# Patient Record
Sex: MALE | Race: WHITE | NOT HISPANIC OR LATINO | Employment: FULL TIME | URBAN - METROPOLITAN AREA
[De-identification: names, ages, dates, MRNs, and addresses within clinical notes are randomized per-mention and may not be internally consistent; named-entity substitution may affect disease eponyms.]

---

## 2017-09-29 ENCOUNTER — HOSPITAL ENCOUNTER (EMERGENCY)
Facility: HOSPITAL | Age: 57
Discharge: HOME | End: 2017-09-29
Attending: EMERGENCY MEDICINE | Admitting: EMERGENCY MEDICINE

## 2017-09-29 ENCOUNTER — APPOINTMENT (OUTPATIENT)
Dept: CT IMAGING | Facility: HOSPITAL | Age: 57
End: 2017-09-29

## 2017-09-29 VITALS
HEART RATE: 60 BPM | BODY MASS INDEX: 26.79 KG/M2 | TEMPERATURE: 98.5 F | DIASTOLIC BLOOD PRESSURE: 85 MMHG | OXYGEN SATURATION: 97 % | SYSTOLIC BLOOD PRESSURE: 135 MMHG | HEIGHT: 76 IN | RESPIRATION RATE: 16 BRPM | WEIGHT: 220 LBS

## 2017-09-29 DIAGNOSIS — N20.1 RIGHT URETERAL STONE: Primary | ICD-10-CM

## 2017-09-29 LAB
ALBUMIN SERPL-MCNC: 4.3 G/DL (ref 3.2–4.8)
ALBUMIN/GLOB SERPL: 1.7 G/DL (ref 1.5–2.5)
ALP SERPL-CCNC: 109 U/L (ref 25–100)
ALT SERPL W P-5'-P-CCNC: 33 U/L (ref 7–40)
ANION GAP SERPL CALCULATED.3IONS-SCNC: 10 MMOL/L (ref 3–11)
AST SERPL-CCNC: 31 U/L (ref 0–33)
BACTERIA UR QL AUTO: ABNORMAL /HPF
BASOPHILS # BLD AUTO: 0.01 10*3/MM3 (ref 0–0.2)
BASOPHILS NFR BLD AUTO: 0.1 % (ref 0–1)
BILIRUB SERPL-MCNC: 0.6 MG/DL (ref 0.3–1.2)
BILIRUB UR QL STRIP: NEGATIVE
BUN BLD-MCNC: 10 MG/DL (ref 9–23)
BUN/CREAT SERPL: 9.1 (ref 7–25)
CALCIUM SPEC-SCNC: 9.6 MG/DL (ref 8.7–10.4)
CHLORIDE SERPL-SCNC: 102 MMOL/L (ref 99–109)
CLARITY UR: CLEAR
CO2 SERPL-SCNC: 25 MMOL/L (ref 20–31)
COLOR UR: YELLOW
CREAT BLD-MCNC: 1.1 MG/DL (ref 0.6–1.3)
DEPRECATED RDW RBC AUTO: 40 FL (ref 37–54)
EOSINOPHIL # BLD AUTO: 0.15 10*3/MM3 (ref 0–0.3)
EOSINOPHIL NFR BLD AUTO: 1.7 % (ref 0–3)
ERYTHROCYTE [DISTWIDTH] IN BLOOD BY AUTOMATED COUNT: 12.5 % (ref 11.3–14.5)
GFR SERPL CREATININE-BSD FRML MDRD: 69 ML/MIN/1.73
GLOBULIN UR ELPH-MCNC: 2.5 GM/DL
GLUCOSE BLD-MCNC: 159 MG/DL (ref 70–100)
GLUCOSE UR STRIP-MCNC: NEGATIVE MG/DL
HCT VFR BLD AUTO: 40.5 % (ref 38.9–50.9)
HGB BLD-MCNC: 13.6 G/DL (ref 13.1–17.5)
HGB UR QL STRIP.AUTO: ABNORMAL
HOLD SPECIMEN: NORMAL
HOLD SPECIMEN: NORMAL
HYALINE CASTS UR QL AUTO: ABNORMAL /LPF
IMM GRANULOCYTES # BLD: 0.03 10*3/MM3 (ref 0–0.03)
IMM GRANULOCYTES NFR BLD: 0.3 % (ref 0–0.6)
KETONES UR QL STRIP: NEGATIVE
LEUKOCYTE ESTERASE UR QL STRIP.AUTO: NEGATIVE
LIPASE SERPL-CCNC: 172 U/L (ref 6–51)
LYMPHOCYTES # BLD AUTO: 1.43 10*3/MM3 (ref 0.6–4.8)
LYMPHOCYTES NFR BLD AUTO: 16.4 % (ref 24–44)
MCH RBC QN AUTO: 29.8 PG (ref 27–31)
MCHC RBC AUTO-ENTMCNC: 33.6 G/DL (ref 32–36)
MCV RBC AUTO: 88.6 FL (ref 80–99)
MONOCYTES # BLD AUTO: 0.81 10*3/MM3 (ref 0–1)
MONOCYTES NFR BLD AUTO: 9.3 % (ref 0–12)
NEUTROPHILS # BLD AUTO: 6.31 10*3/MM3 (ref 1.5–8.3)
NEUTROPHILS NFR BLD AUTO: 72.2 % (ref 41–71)
NITRITE UR QL STRIP: NEGATIVE
PH UR STRIP.AUTO: 6 [PH] (ref 5–8)
PLATELET # BLD AUTO: 198 10*3/MM3 (ref 150–450)
PMV BLD AUTO: 9.5 FL (ref 6–12)
POTASSIUM BLD-SCNC: 4.2 MMOL/L (ref 3.5–5.5)
PROT SERPL-MCNC: 6.8 G/DL (ref 5.7–8.2)
PROT UR QL STRIP: NEGATIVE
RBC # BLD AUTO: 4.57 10*6/MM3 (ref 4.2–5.76)
RBC # UR: ABNORMAL /HPF
REF LAB TEST METHOD: ABNORMAL
SODIUM BLD-SCNC: 137 MMOL/L (ref 132–146)
SP GR UR STRIP: 1.01 (ref 1–1.03)
SQUAMOUS #/AREA URNS HPF: ABNORMAL /HPF
UROBILINOGEN UR QL STRIP: ABNORMAL
WBC NRBC COR # BLD: 8.74 10*3/MM3 (ref 3.5–10.8)
WBC UR QL AUTO: ABNORMAL /HPF
WHOLE BLOOD HOLD SPECIMEN: NORMAL
WHOLE BLOOD HOLD SPECIMEN: NORMAL

## 2017-09-29 PROCEDURE — 85025 COMPLETE CBC W/AUTO DIFF WBC: CPT | Performed by: EMERGENCY MEDICINE

## 2017-09-29 PROCEDURE — 96374 THER/PROPH/DIAG INJ IV PUSH: CPT

## 2017-09-29 PROCEDURE — 99284 EMERGENCY DEPT VISIT MOD MDM: CPT

## 2017-09-29 PROCEDURE — 25010000002 MORPHINE PER 10 MG: Performed by: EMERGENCY MEDICINE

## 2017-09-29 PROCEDURE — 74176 CT ABD & PELVIS W/O CONTRAST: CPT

## 2017-09-29 PROCEDURE — 80053 COMPREHEN METABOLIC PANEL: CPT | Performed by: EMERGENCY MEDICINE

## 2017-09-29 PROCEDURE — 83690 ASSAY OF LIPASE: CPT | Performed by: EMERGENCY MEDICINE

## 2017-09-29 PROCEDURE — 81001 URINALYSIS AUTO W/SCOPE: CPT | Performed by: EMERGENCY MEDICINE

## 2017-09-29 PROCEDURE — 25010000002 KETOROLAC TROMETHAMINE PER 15 MG: Performed by: EMERGENCY MEDICINE

## 2017-09-29 PROCEDURE — 96375 TX/PRO/DX INJ NEW DRUG ADDON: CPT

## 2017-09-29 RX ORDER — SODIUM CHLORIDE 0.9 % (FLUSH) 0.9 %
10 SYRINGE (ML) INJECTION AS NEEDED
Status: DISCONTINUED | OUTPATIENT
Start: 2017-09-29 | End: 2017-09-29 | Stop reason: HOSPADM

## 2017-09-29 RX ORDER — OMEPRAZOLE 20 MG/1
20 CAPSULE, DELAYED RELEASE ORAL DAILY
COMMUNITY

## 2017-09-29 RX ORDER — SERTRALINE HYDROCHLORIDE 100 MG/1
100 TABLET, FILM COATED ORAL DAILY
COMMUNITY

## 2017-09-29 RX ORDER — OXYCODONE AND ACETAMINOPHEN 7.5; 325 MG/1; MG/1
1 TABLET ORAL EVERY 6 HOURS PRN
Qty: 15 TABLET | Refills: 0 | Status: SHIPPED | OUTPATIENT
Start: 2017-09-29

## 2017-09-29 RX ORDER — DICLOFENAC SODIUM 75 MG/1
75 TABLET, DELAYED RELEASE ORAL 2 TIMES DAILY
Qty: 14 TABLET | Refills: 0 | Status: SHIPPED | OUTPATIENT
Start: 2017-09-29

## 2017-09-29 RX ORDER — ASPIRIN 81 MG/1
81 TABLET ORAL DAILY
COMMUNITY

## 2017-09-29 RX ORDER — KETOROLAC TROMETHAMINE 15 MG/ML
15 INJECTION, SOLUTION INTRAMUSCULAR; INTRAVENOUS ONCE
Status: COMPLETED | OUTPATIENT
Start: 2017-09-29 | End: 2017-09-29

## 2017-09-29 RX ORDER — MORPHINE SULFATE 4 MG/ML
4 INJECTION, SOLUTION INTRAMUSCULAR; INTRAVENOUS ONCE
Status: COMPLETED | OUTPATIENT
Start: 2017-09-29 | End: 2017-09-29

## 2017-09-29 RX ORDER — ONDANSETRON 4 MG/1
4 TABLET, ORALLY DISINTEGRATING ORAL EVERY 6 HOURS PRN
Qty: 15 TABLET | Refills: 0 | Status: SHIPPED | OUTPATIENT
Start: 2017-09-29

## 2017-09-29 RX ADMIN — MORPHINE SULFATE 4 MG: 4 INJECTION, SOLUTION INTRAMUSCULAR; INTRAVENOUS at 20:09

## 2017-09-29 RX ADMIN — KETOROLAC TROMETHAMINE 15 MG: 15 INJECTION, SOLUTION INTRAMUSCULAR; INTRAVENOUS at 20:08

## 2021-03-12 ENCOUNTER — IMMUNIZATION (OUTPATIENT)
Dept: VACCINE CLINIC | Facility: HOSPITAL | Age: 61
End: 2021-03-12

## 2021-03-12 PROCEDURE — 91300 HC SARSCOV02 VAC 30MCG/0.3ML IM: CPT | Performed by: INTERNAL MEDICINE

## 2021-03-12 PROCEDURE — 0001A: CPT | Performed by: INTERNAL MEDICINE

## 2021-04-02 ENCOUNTER — IMMUNIZATION (OUTPATIENT)
Dept: VACCINE CLINIC | Facility: HOSPITAL | Age: 61
End: 2021-04-02

## 2021-04-02 PROCEDURE — 0002A: CPT | Performed by: INTERNAL MEDICINE

## 2021-04-02 PROCEDURE — 91300 HC SARSCOV02 VAC 30MCG/0.3ML IM: CPT | Performed by: INTERNAL MEDICINE

## 2022-03-16 ENCOUNTER — TRANSCRIBE ORDERS (OUTPATIENT)
Dept: ADMINISTRATIVE | Facility: HOSPITAL | Age: 62
End: 2022-03-16

## 2022-03-16 ENCOUNTER — HOSPITAL ENCOUNTER (OUTPATIENT)
Dept: CT IMAGING | Facility: HOSPITAL | Age: 62
Discharge: HOME | End: 2022-03-16
Admitting: NURSE PRACTITIONER

## 2022-03-16 DIAGNOSIS — R10.31 RLQ ABDOMINAL PAIN: ICD-10-CM

## 2022-03-16 DIAGNOSIS — R10.31 RLQ ABDOMINAL PAIN: Primary | ICD-10-CM

## 2022-03-16 PROCEDURE — 74176 CT ABD & PELVIS W/O CONTRAST: CPT

## 2023-09-01 ENCOUNTER — HOSPITAL ENCOUNTER (INPATIENT)
Facility: HOSPITAL | Age: 63
LOS: 3 days | Discharge: HOME | DRG: 309 | End: 2023-09-04
Attending: EMERGENCY MEDICINE | Admitting: INTERNAL MEDICINE
Payer: COMMERCIAL

## 2023-09-01 DIAGNOSIS — I48.91 ATRIAL FIBRILLATION WITH RAPID VENTRICULAR RESPONSE: Primary | ICD-10-CM

## 2023-09-01 DIAGNOSIS — I50.9 ACUTE CONGESTIVE HEART FAILURE, UNSPECIFIED HEART FAILURE TYPE: ICD-10-CM

## 2023-09-01 DIAGNOSIS — I16.0 HYPERTENSIVE URGENCY: ICD-10-CM

## 2023-09-01 PROBLEM — I48.20 CHRONIC ATRIAL FIBRILLATION: Status: ACTIVE | Noted: 2023-09-01

## 2023-09-01 PROBLEM — I48.0 PAROXYSMAL ATRIAL FIBRILLATION: Status: ACTIVE | Noted: 2023-09-01

## 2023-09-01 LAB
ALBUMIN SERPL-MCNC: 4.1 G/DL (ref 3.5–5.2)
ALBUMIN/GLOB SERPL: 1.5 G/DL
ALP SERPL-CCNC: 116 U/L (ref 39–117)
ALT SERPL W P-5'-P-CCNC: 47 U/L (ref 1–41)
ANION GAP SERPL CALCULATED.3IONS-SCNC: 13 MMOL/L (ref 5–15)
APTT PPP: 30.3 SECONDS (ref 60–90)
AST SERPL-CCNC: 39 U/L (ref 1–40)
BASOPHILS # BLD AUTO: 0.04 10*3/MM3 (ref 0–0.2)
BASOPHILS NFR BLD AUTO: 0.8 % (ref 0–1.5)
BILIRUB SERPL-MCNC: 0.8 MG/DL (ref 0–1.2)
BUN SERPL-MCNC: 17 MG/DL (ref 8–23)
BUN/CREAT SERPL: 19.1 (ref 7–25)
CALCIUM SPEC-SCNC: 8.9 MG/DL (ref 8.6–10.5)
CHLORIDE SERPL-SCNC: 107 MMOL/L (ref 98–107)
CO2 SERPL-SCNC: 23 MMOL/L (ref 22–29)
CREAT SERPL-MCNC: 0.89 MG/DL (ref 0.76–1.27)
DEPRECATED RDW RBC AUTO: 45.9 FL (ref 37–54)
EGFRCR SERPLBLD CKD-EPI 2021: 96.9 ML/MIN/1.73
EOSINOPHIL # BLD AUTO: 0.11 10*3/MM3 (ref 0–0.4)
EOSINOPHIL NFR BLD AUTO: 2.1 % (ref 0.3–6.2)
ERYTHROCYTE [DISTWIDTH] IN BLOOD BY AUTOMATED COUNT: 13.3 % (ref 12.3–15.4)
FLUAV RNA RESP QL NAA+PROBE: NOT DETECTED
FLUBV RNA RESP QL NAA+PROBE: NOT DETECTED
GLOBULIN UR ELPH-MCNC: 2.7 GM/DL
GLUCOSE SERPL-MCNC: 107 MG/DL (ref 65–99)
HBA1C MFR BLD: 5.8 % (ref 4.8–5.6)
HCT VFR BLD AUTO: 44.9 % (ref 37.5–51)
HGB BLD-MCNC: 14.1 G/DL (ref 13–17.7)
HOLD SPECIMEN: NORMAL
IMM GRANULOCYTES # BLD AUTO: 0.01 10*3/MM3 (ref 0–0.05)
IMM GRANULOCYTES NFR BLD AUTO: 0.2 % (ref 0–0.5)
INR PPP: 1.21 (ref 0.89–1.12)
LYMPHOCYTES # BLD AUTO: 1.84 10*3/MM3 (ref 0.7–3.1)
LYMPHOCYTES NFR BLD AUTO: 35.7 % (ref 19.6–45.3)
MAGNESIUM SERPL-MCNC: 2 MG/DL (ref 1.6–2.4)
MCH RBC QN AUTO: 29.4 PG (ref 26.6–33)
MCHC RBC AUTO-ENTMCNC: 31.4 G/DL (ref 31.5–35.7)
MCV RBC AUTO: 93.5 FL (ref 79–97)
MONOCYTES # BLD AUTO: 0.43 10*3/MM3 (ref 0.1–0.9)
MONOCYTES NFR BLD AUTO: 8.3 % (ref 5–12)
NEUTROPHILS NFR BLD AUTO: 2.73 10*3/MM3 (ref 1.7–7)
NEUTROPHILS NFR BLD AUTO: 52.9 % (ref 42.7–76)
NRBC BLD AUTO-RTO: 0 /100 WBC (ref 0–0.2)
NT-PROBNP SERPL-MCNC: 939 PG/ML (ref 0–900)
PLATELET # BLD AUTO: 203 10*3/MM3 (ref 140–450)
PMV BLD AUTO: 11.2 FL (ref 6–12)
POTASSIUM SERPL-SCNC: 4.3 MMOL/L (ref 3.5–5.2)
PROT SERPL-MCNC: 6.8 G/DL (ref 6–8.5)
PROTHROMBIN TIME: 15.5 SECONDS (ref 12.2–14.5)
QT INTERVAL: 270 MS
QTC INTERVAL: 451 MS
RBC # BLD AUTO: 4.8 10*6/MM3 (ref 4.14–5.8)
RSV RNA NPH QL NAA+NON-PROBE: NOT DETECTED
SARS-COV-2 RNA RESP QL NAA+PROBE: NOT DETECTED
SODIUM SERPL-SCNC: 143 MMOL/L (ref 136–145)
TROPONIN T SERPL HS-MCNC: 12 NG/L
TSH SERPL DL<=0.05 MIU/L-ACNC: 1.65 UIU/ML (ref 0.27–4.2)
UFH PPP CHRO-ACNC: 0.1 IU/ML (ref 0.3–0.7)
WBC NRBC COR # BLD: 5.16 10*3/MM3 (ref 3.4–10.8)
WHOLE BLOOD HOLD COAG: NORMAL
WHOLE BLOOD HOLD SPECIMEN: NORMAL

## 2023-09-01 PROCEDURE — 80050 GENERAL HEALTH PANEL: CPT

## 2023-09-01 PROCEDURE — 83735 ASSAY OF MAGNESIUM: CPT | Performed by: INTERNAL MEDICINE

## 2023-09-01 PROCEDURE — 36415 COLL VENOUS BLD VENIPUNCTURE: CPT

## 2023-09-01 PROCEDURE — 99285 EMERGENCY DEPT VISIT HI MDM: CPT

## 2023-09-01 PROCEDURE — 85610 PROTHROMBIN TIME: CPT | Performed by: EMERGENCY MEDICINE

## 2023-09-01 PROCEDURE — 25010000002 HEPARIN (PORCINE) PER 1000 UNITS: Performed by: EMERGENCY MEDICINE

## 2023-09-01 PROCEDURE — 84484 ASSAY OF TROPONIN QUANT: CPT

## 2023-09-01 PROCEDURE — 25010000002 HEPARIN (PORCINE) 25000-0.45 UT/250ML-% SOLUTION: Performed by: EMERGENCY MEDICINE

## 2023-09-01 PROCEDURE — 93005 ELECTROCARDIOGRAM TRACING: CPT

## 2023-09-01 PROCEDURE — 87637 SARSCOV2&INF A&B&RSV AMP PRB: CPT | Performed by: EMERGENCY MEDICINE

## 2023-09-01 PROCEDURE — 83880 ASSAY OF NATRIURETIC PEPTIDE: CPT

## 2023-09-01 PROCEDURE — 85520 HEPARIN ASSAY: CPT | Performed by: EMERGENCY MEDICINE

## 2023-09-01 PROCEDURE — 99223 1ST HOSP IP/OBS HIGH 75: CPT | Performed by: INTERNAL MEDICINE

## 2023-09-01 PROCEDURE — 25010000002 LABETALOL 5 MG/ML SOLUTION 20 ML VIAL: Performed by: EMERGENCY MEDICINE

## 2023-09-01 PROCEDURE — 83036 HEMOGLOBIN GLYCOSYLATED A1C: CPT | Performed by: INTERNAL MEDICINE

## 2023-09-01 PROCEDURE — 85730 THROMBOPLASTIN TIME PARTIAL: CPT | Performed by: EMERGENCY MEDICINE

## 2023-09-01 PROCEDURE — 25010000002 DIGOXIN PER 500 MCG: Performed by: INTERNAL MEDICINE

## 2023-09-01 PROCEDURE — 25010000002 FUROSEMIDE PER 20 MG: Performed by: EMERGENCY MEDICINE

## 2023-09-01 RX ORDER — DIGOXIN 0.25 MG/ML
500 INJECTION INTRAMUSCULAR; INTRAVENOUS ONCE
Status: COMPLETED | OUTPATIENT
Start: 2023-09-01 | End: 2023-09-01

## 2023-09-01 RX ORDER — HEPARIN SODIUM 1000 [USP'U]/ML
2000 INJECTION, SOLUTION INTRAVENOUS; SUBCUTANEOUS AS NEEDED
Status: DISCONTINUED | OUTPATIENT
Start: 2023-09-01 | End: 2023-09-01

## 2023-09-01 RX ORDER — DILTIAZEM HYDROCHLORIDE 5 MG/ML
10 INJECTION INTRAVENOUS ONCE
Status: COMPLETED | OUTPATIENT
Start: 2023-09-01 | End: 2023-09-01

## 2023-09-01 RX ORDER — SODIUM CHLORIDE 0.9 % (FLUSH) 0.9 %
10 SYRINGE (ML) INJECTION AS NEEDED
Status: DISCONTINUED | OUTPATIENT
Start: 2023-09-01 | End: 2023-09-04 | Stop reason: HOSPADM

## 2023-09-01 RX ORDER — DIPHENOXYLATE HYDROCHLORIDE AND ATROPINE SULFATE 2.5; .025 MG/1; MG/1
1 TABLET ORAL DAILY
COMMUNITY

## 2023-09-01 RX ORDER — PANTOPRAZOLE SODIUM 40 MG/1
40 TABLET, DELAYED RELEASE ORAL
Status: DISCONTINUED | OUTPATIENT
Start: 2023-09-02 | End: 2023-09-04 | Stop reason: HOSPADM

## 2023-09-01 RX ORDER — FUROSEMIDE 10 MG/ML
40 INJECTION INTRAMUSCULAR; INTRAVENOUS DAILY
Status: DISCONTINUED | OUTPATIENT
Start: 2023-09-02 | End: 2023-09-03

## 2023-09-01 RX ORDER — CHOLECALCIFEROL (VITAMIN D3) 125 MCG
500 CAPSULE ORAL DAILY
COMMUNITY

## 2023-09-01 RX ORDER — ATORVASTATIN CALCIUM 10 MG/1
10 TABLET, FILM COATED ORAL DAILY
Status: DISCONTINUED | OUTPATIENT
Start: 2023-09-02 | End: 2023-09-04 | Stop reason: HOSPADM

## 2023-09-01 RX ORDER — HEPARIN SODIUM 10000 [USP'U]/100ML
7.5 INJECTION, SOLUTION INTRAVENOUS
Status: DISCONTINUED | OUTPATIENT
Start: 2023-09-01 | End: 2023-09-01

## 2023-09-01 RX ORDER — SERTRALINE HYDROCHLORIDE 100 MG/1
100 TABLET, FILM COATED ORAL DAILY
Status: DISCONTINUED | OUTPATIENT
Start: 2023-09-02 | End: 2023-09-04 | Stop reason: HOSPADM

## 2023-09-01 RX ORDER — FUROSEMIDE 10 MG/ML
60 INJECTION INTRAMUSCULAR; INTRAVENOUS ONCE
Status: COMPLETED | OUTPATIENT
Start: 2023-09-01 | End: 2023-09-01

## 2023-09-01 RX ORDER — ONDANSETRON 2 MG/ML
4 INJECTION INTRAMUSCULAR; INTRAVENOUS EVERY 6 HOURS PRN
Status: DISCONTINUED | OUTPATIENT
Start: 2023-09-01 | End: 2023-09-04 | Stop reason: HOSPADM

## 2023-09-01 RX ORDER — DIGOXIN 0.25 MG/ML
250 INJECTION INTRAMUSCULAR; INTRAVENOUS ONCE
Status: COMPLETED | OUTPATIENT
Start: 2023-09-02 | End: 2023-09-02

## 2023-09-01 RX ORDER — HEPARIN SODIUM 1000 [USP'U]/ML
4000 INJECTION, SOLUTION INTRAVENOUS; SUBCUTANEOUS AS NEEDED
Status: DISCONTINUED | OUTPATIENT
Start: 2023-09-01 | End: 2023-09-01

## 2023-09-01 RX ORDER — HEPARIN SODIUM 1000 [USP'U]/ML
4000 INJECTION, SOLUTION INTRAVENOUS; SUBCUTANEOUS ONCE
Status: COMPLETED | OUTPATIENT
Start: 2023-09-01 | End: 2023-09-01

## 2023-09-01 RX ADMIN — FUROSEMIDE 60 MG: 20 INJECTION, SOLUTION INTRAMUSCULAR; INTRAVENOUS at 15:46

## 2023-09-01 RX ADMIN — METOPROLOL TARTRATE 25 MG: 25 TABLET, FILM COATED ORAL at 18:23

## 2023-09-01 RX ADMIN — DILTIAZEM HYDROCHLORIDE 10 MG: 5 INJECTION INTRAVENOUS at 15:38

## 2023-09-01 RX ADMIN — DIGOXIN 500 MCG: 0.25 INJECTION INTRAMUSCULAR; INTRAVENOUS at 18:20

## 2023-09-01 RX ADMIN — LABETALOL HYDROCHLORIDE 0.5 MG/MIN: 5 INJECTION INTRAVENOUS at 18:24

## 2023-09-01 RX ADMIN — HEPARIN SODIUM 7.5 UNITS/KG/HR: 10000 INJECTION, SOLUTION INTRAVENOUS at 15:50

## 2023-09-01 RX ADMIN — HEPARIN SODIUM 4000 UNITS: 1000 INJECTION INTRAVENOUS; SUBCUTANEOUS at 15:40

## 2023-09-01 RX ADMIN — DILTIAZEM HYDROCHLORIDE 5 MG/HR: 5 INJECTION INTRAVENOUS at 15:43

## 2023-09-01 RX ADMIN — RIVAROXABAN 20 MG: 20 TABLET, FILM COATED ORAL at 21:58

## 2023-09-01 NOTE — H&P
[facility]  Consultation History and Physical  David Jean Baptiste  1960      PCP:   Deniz Chang MD        Admit Date:  9/1/2023      Chief Complaint:   Chief Complaint   Patient presents with    Shortness of Breath           History of Present Illness:  David Jean Baptiste  Is a 62 y.o. male with medical history including arthritis, depression, hyperlipidemia.  He reports feeling short of breath for the last week or so.  He states when he would lay down he would feel short of breath and hear a crackling noise in his chest.  He went to an urgent care today where he had a chest x-ray that revealed cardiomegaly with pulmonary vascular congestion and small effusions.  He was referred to the ED for further evaluation.  In the ED he was found to be in atrial fibrillation with a rapid ventricular response initial rates 160s.  He has not felt chest pain or rapid palpitations.  He has not shown signs of lower extremity swelling and some abdominal distention.  He was started on a diltiazem drip in the ED and received IV Lasix.  He has diuresed well but he has not had significant change in his heart rate.  He has been hypertensive so labetalol drip is being initiated as well.  proBNP is elevated, troponin normal.  Bedside echo shows EF low normal 45 to 50% in atrial fibrillation with RVR.  TSH pending.  He is in no acute distress      Patient Active Problem List    Diagnosis Date Noted    *Atrial fibrillation 09/01/2023    Paroxysmal atrial fibrillation 09/01/2023       No Known Allergies    Social History     Socioeconomic History    Marital status:    Tobacco Use    Smoking status: Never     Passive exposure: Never    Smokeless tobacco: Never   Vaping Use    Vaping Use: Never used   Substance and Sexual Activity    Alcohol use: Yes     Comment: rare    Drug use: No    Sexual activity: Defer       History reviewed. No pertinent family history.    Current Medications:    Current  Facility-Administered Medications:     [START ON 9/2/2023] digoxin (LANOXIN) injection 250 mcg, 250 mcg, Intravenous, Once, Roque Husain MD    dilTIAZem (CARDIZEM) 125 mg in sodium chloride 0.9 % 125 mL infusion, 5-15 mg/hr, Intravenous, Titrated, Yang Raygoza MD, Last Rate: 15 mL/hr at 09/01/23 1616, 15 mg/hr at 09/01/23 1616    [START ON 9/2/2023] furosemide (LASIX) injection 40 mg, 40 mg, Intravenous, Daily, Roque Husain MD    labetalol (NORMODYNE,TRANDATE) 300 mg in sodium chloride 0.9 % 300 mL infusion, 0.5-2 mg/min, Intravenous, Titrated, Yang Raygoza MD, Last Rate: 30 mL/hr at 09/01/23 1824, 0.5 mg/min at 09/01/23 1824    metoprolol tartrate (LOPRESSOR) tablet 25 mg, 25 mg, Oral, Q12H, Roque Husain MD, 25 mg at 09/01/23 1823    ondansetron (ZOFRAN) injection 4 mg, 4 mg, Intravenous, Q6H PRN, Fco Moore MD    Potassium Replacement - Follow Nurse / BPA Driven Protocol, , Does not apply, PRN, Fco Moore MD    sodium chloride 0.9 % flush 10 mL, 10 mL, Intravenous, PRN, Emergency, Triage Protocol, MD    Current Outpatient Medications:     aspirin 81 MG EC tablet, Take 1 tablet by mouth Daily., Disp: , Rfl:     diclofenac (VOLTAREN) 75 MG EC tablet, Take 1 tablet by mouth 2 (Two) Times a Day., Disp: 14 tablet, Rfl: 0    lovastatin (ALTOPREV) 40 MG 24 hr tablet, Take 1 tablet by mouth Every Night., Disp: , Rfl:     multivitamin (Multiple Vitamin) tablet tablet, Take 1 tablet by mouth Daily., Disp: , Rfl:     omeprazole (priLOSEC) 20 MG capsule, Take 1 capsule by mouth Daily., Disp: , Rfl:     sertraline (ZOLOFT) 100 MG tablet, Take 1 tablet by mouth Daily., Disp: , Rfl:     Turmeric 500 MG capsule, Take 1 capsule by mouth Daily., Disp: , Rfl:     vitamin B-12 (CYANOCOBALAMIN) 500 MCG tablet, Take 1 tablet by mouth Daily. OTC, Disp: , Rfl:      Review of Systems   Cardiovascular:  Positive for dyspnea on exertion, leg swelling and orthopnea. Negative for chest pain.    Respiratory:  Positive for shortness of breath.      OBJECTIVE:  Vitals:    09/01/23 1730 09/01/23 1750 09/01/23 1820 09/01/23 1830   BP: (!) 129/113 158/98 (!) 132/119 (!) 132/117   BP Location:       Patient Position:       Pulse: (!) 141 (!) 149 (!) 151 (!) 130   Resp:       Temp:       TempSrc:       SpO2: 91% (!) 82%  91%   Weight:       Height:         No intake/output data recorded.  I/O this shift:  In: -   Out: 4200 [Urine:4200]  Intake & Output (last 3 days)         08/29 0701 08/30 0700 08/30 0701 08/31 0700 08/31 0701 09/01 0700 09/01 0701 09/02 0700    Urine (mL/kg/hr)    4200    Total Output    4200    Net    -4200                       Physical Exam  Constitutional:       Appearance: Normal appearance.   Cardiovascular:      Rate and Rhythm: Tachycardia present. Rhythm irregular.      Heart sounds: No murmur heard.  Pulmonary:      Effort: Pulmonary effort is normal.      Breath sounds: Rales (Bibasilar) present.   Abdominal:      Palpations: Abdomen is soft.   Musculoskeletal:      Right lower leg: Edema present.      Left lower leg: Edema present.      Comments: 2+   Neurological:      General: No focal deficit present.      Mental Status: He is alert.       Diagnostic Data:  Lab Results (last 24 hours)       Procedure Component Value Units Date/Time    TSH Rfx On Abnormal To Free T4 [[id number]] Collected: 09/01/23 1532    Specimen: Blood Updated: 09/01/23 1756    Heparin Anti-Xa [[id number]]  (Abnormal) Collected: 09/01/23 1532    Specimen: Blood Updated: 09/01/23 1653     Heparin Anti-Xa (UFH) 0.10 IU/ml     aPTT [[id number]]  (Abnormal) Collected: 09/01/23 1532    Specimen: Blood Updated: 09/01/23 1652     PTT 30.3 seconds     Narrative:      PTT = The equivalent PTT values for the therapeutic range of heparin levels at 0.3 to 0.5 U/ml are 60 to 70 seconds.    Protime-INR [[id number]]  (Abnormal) Collected: 09/01/23 1532    Specimen: Blood Updated: 09/01/23 1652     Protime 15.5 Seconds       INR 1.21    [place] Draw [[id number]] Collected: 09/01/23 1532    Specimen: Blood Updated: 09/01/23 1645    Narrative:      The following orders were created for panel order [place] Draw.  Procedure                               Abnormality         Status                     ---------                               -----------         ------                     Green Top (Gel)[[id number]]                                  Final result               Lavender Top[[id number]]                                     Final result               Gold Top - SST[[id number]]                                   Final result               Cedillo Top[[id number]]                                         In process                 Light Blue Top[[id number]]                                   Final result                 Please view results for these tests on the individual orders.    Green Top (Gel) [[id number]] Collected: 09/01/23 1532    Specimen: Blood Updated: 09/01/23 1645     Extra Tube Hold for add-ons.     Comment: Auto resulted.       Lavender Top [[id number]] Collected: 09/01/23 1532    Specimen: Blood Updated: 09/01/23 1645     Extra Tube hold for add-on     Comment: Auto resulted       Light Blue Top [[id number]] Collected: 09/01/23 1532    Specimen: Blood Updated: 09/01/23 1645     Extra Tube Hold for add-ons.     Comment: Auto resulted       Gold Top - SST [[id number]] Collected: 09/01/23 1532    Specimen: Blood Updated: 09/01/23 1645     Extra Tube Hold for add-ons.     Comment: Auto resulted.       COVID PRE-OP / PRE-PROCEDURE SCREENING ORDER (NO ISOLATION) - Swab, Nasopharynx [[id number]]  (Normal) Collected: 09/01/23 1532    Specimen: Swab from Nasopharynx Updated: 09/01/23 1632    Narrative:      The following orders were created for panel order COVID PRE-OP / PRE-PROCEDURE SCREENING ORDER (NO ISOLATION) - Swab, Nasopharynx.  Procedure                               Abnormality         Status                     ---------                                -----------         ------                     COVID-19, FLU A/B, RSV P...[[id number]]  Normal              Final result                 Please view results for these tests on the individual orders.    COVID-19, FLU A/B, RSV PCR - Swab, Nasopharynx [[id number]]  (Normal) Collected: 09/01/23 1532    Specimen: Swab from Nasopharynx Updated: 09/01/23 1632     COVID19 Not Detected     Influenza A PCR Not Detected     Influenza B PCR Not Detected     RSV, PCR Not Detected    Narrative:      Fact sheet for providers: https://www.fda.gov/media/469138/download    Fact sheet for patients: https://www.fda.gov/media/623526/download    Test performed by PCR.    BNP [[id number]]  (Abnormal) Collected: 09/01/23 1532    Specimen: Blood Updated: 09/01/23 1613     proBNP 939.0 pg/mL     Narrative:      Among patients with dyspnea, NT-proBNP is highly sensitive for the detection of acute congestive heart failure. In addition NT-proBNP of <300 pg/ml effectively rules out acute congestive heart failure with 99% negative predictive value.      Single High Sensitivity Troponin T [[id number]]  (Normal) Collected: 09/01/23 1532    Specimen: Blood Updated: 09/01/23 1613     HS Troponin T 12 ng/L     Narrative:      High Sensitive Troponin T Reference Range:  <10.0 ng/L- Negative Female for AMI  <15.0 ng/L- Negative Male for AMI  >=10 - Abnormal Female indicating possible myocardial injury.  >=15 - Abnormal Male indicating possible myocardial injury.   Clinicians would have to utilize clinical acumen, EKG, Troponin, and serial changes to determine if it is an Acute Myocardial Infarction or myocardial injury due to an underlying chronic condition.         Comprehensive Metabolic Panel [[id number]]  (Abnormal) Collected: 09/01/23 1532    Specimen: Blood Updated: 09/01/23 1609     Glucose 107 mg/dL      BUN 17 mg/dL      Creatinine 0.89 mg/dL      Sodium 143 mmol/L      Potassium 4.3 mmol/L      Comment: Slight hemolysis detected by  analyzer. Results may be affected.        Chloride 107 mmol/L      CO2 23.0 mmol/L      Calcium 8.9 mg/dL      Total Protein 6.8 g/dL      Albumin 4.1 g/dL      ALT (SGPT) 47 U/L      AST (SGOT) 39 U/L      Alkaline Phosphatase 116 U/L      Total Bilirubin 0.8 mg/dL      Globulin 2.7 gm/dL      Comment: Calculated Result        A/G Ratio 1.5 g/dL      BUN/Creatinine Ratio 19.1     Anion Gap 13.0 mmol/L      eGFR 96.9 mL/min/1.73     Narrative:      GFR Normal >60  Chronic Kidney Disease <60  Kidney Failure <15      CBC & Differential [[id number]]  (Abnormal) Collected: 09/01/23 1532    Specimen: Blood Updated: 09/01/23 1555    Narrative:      The following orders were created for panel order CBC & Differential.  Procedure                               Abnormality         Status                     ---------                               -----------         ------                     CBC Auto Differential[[id number]]        Abnormal            Final result                 Please view results for these tests on the individual orders.    CBC Auto Differential [[id number]]  (Abnormal) Collected: 09/01/23 1532    Specimen: Blood Updated: 09/01/23 1555     WBC 5.16 10*3/mm3      RBC 4.80 10*6/mm3      Hemoglobin 14.1 g/dL      Hematocrit 44.9 %      MCV 93.5 fL      MCH 29.4 pg      MCHC 31.4 g/dL      RDW 13.3 %      RDW-SD 45.9 fl      MPV 11.2 fL      Platelets 203 10*3/mm3      Neutrophil % 52.9 %      Lymphocyte % 35.7 %      Monocyte % 8.3 %      Eosinophil % 2.1 %      Basophil % 0.8 %      Immature Grans % 0.2 %      Neutrophils, Absolute 2.73 10*3/mm3      Lymphocytes, Absolute 1.84 10*3/mm3      Monocytes, Absolute 0.43 10*3/mm3      Eosinophils, Absolute 0.11 10*3/mm3      Basophils, Absolute 0.04 10*3/mm3      Immature Grans, Absolute 0.01 10*3/mm3      nRBC 0.0 /100 WBC     Cedillo Top [[id number]] Collected: 09/01/23 1532    Specimen: Blood Updated: 09/01/23 1542          ECG/EMG Results (last 24 hours)        Procedure Component Value Units Date/Time    ECG 12 Lead ED Triage Standing Order; KIERAN [[id number]] Collected: 09/01/23 1505     Updated: 09/01/23 1530     QT Interval 270 ms      QTC Interval 451 ms     Narrative:      Test Reason : ED Triage Standing Order~  Blood Pressure :   */*   mmHG  Vent. Rate : 168 BPM     Atrial Rate : 135 BPM     P-R Int :   * ms          QRS Dur :  80 ms      QT Int : 270 ms       P-R-T Axes :   *  12   0 degrees     QTc Int : 451 ms    Atrial fibrillation with rapid ventricular response  Nonspecific T wave abnormality  Abnormal ECG  No previous ECGs available  Confirmed by ANDRA CRENSHAW MD ([id number]) on 9/1/2023 3:30:11 PM    Referred By: ED MD           Confirmed By: ANDRA CRENSHAW MD              Atrial fibrillation    Paroxysmal atrial fibrillation      Assessment/Plan:      Atrial fibrillation with RVR  Diltiazem drip initiated in the ED with minimal change in rate  We will give IV digoxin 500 mcg now and to 50 mcg in 6 hours  Labetalol drip also started for hypertension in the ED  I will start p.o. metoprolol in an effort to wean off of above drips  Initially placed on heparin drip for anticoagulation, will give a dose of Xarelto 20 mg this evening and DC heparin  [id number] (hypertension, acute CHF)  Discussed with the patient if remaining in A-fib with the RVR despite above medical interventions we will try to arrange for ARIANE/cardioversion at bedside in ICU tomorrow AM.  N.p.o. after midnight  2.  Acute heart failure   A.  On bedside echo EF appears low normal 45 to 50% in rapid A-fib   B.  Continue Lasix 40 mg IV daily already having a good response  ` C.  Suspect his EF will improve with restoration of sinus rhythm.   D.  No acute EKG changes, troponin negative      Discussed plan of care with Dr. Crenshaw and Dr. Moore.  Due to patient still being in RVR needing multiple IV drips to control rates in order to try and coordinate a ARIANE cardioversion tomorrow he will be admitted to  ICU.        Roque Husain MD [facility] 9/1/2023 18:53 EDT

## 2023-09-01 NOTE — ED PROVIDER NOTES
" EMERGENCY DEPARTMENT ENCOUNTER    Pt Name: David Jean Baptiste  MRN: [id number]  Pt :   1960  Room Number:    Date of encounter:  2023  PCP: Deniz Chang MD  ED Provider: Yang Raygoza MD    Historian: Patient and wife      HPI:  Chief Complaint: Shortness of breath and abnormal chest x-ray        Context: David Jean Baptiste is a 62 y.o. male who presents to the ED c/o shortness of breath with \"crackles\" heard at night intermittently over the last week.  The patient notes significant dyspnea on exertion.  He went to an urgent treatment center who performed a chest x-ray today.  He was told that he had \"fluid on my lungs\".  He was sent to our facility for further evaluation.  The patient notes his blood pressure normally runs around 120/80 but was markedly elevated when he went to the urgent treatment center.  The patient denies prior history of atrial fibrillation, congestive heart failure, cardiac issues.  The patient works at [employer] and is on his feet most of the day.  He has chronic lower extremity edema which he states is no worse than at his baseline.  No recent chest pain.        PAST MEDICAL HISTORY  Past Medical History:   Diagnosis Date    Anxiety     GERD (gastroesophageal reflux disease)     Hyperlipidemia     Renal disorder          PAST SURGICAL HISTORY  Past Surgical History:   Procedure Laterality Date    CIRCUMCISION      DENTAL PROCEDURE Bilateral     implants         FAMILY HISTORY  History reviewed. No pertinent family history.      SOCIAL HISTORY  Social History     Socioeconomic History    Marital status:    Tobacco Use    Smoking status: Never     Passive exposure: Never    Smokeless tobacco: Never   Vaping Use    Vaping Use: Never used   Substance and Sexual Activity    Alcohol use: Yes     Comment: rare    Drug use: No    Sexual activity: Defer         ALLERGIES  Patient has no known allergies.        REVIEW OF SYSTEMS  Review of Systems       All systems " reviewed and negative except for those discussed in HPI.       PHYSICAL EXAM    I have reviewed the triage vital signs and nursing notes.    ED Triage Vitals [09/01/23 1458]   Temp Heart Rate Resp BP SpO2   98.4 °F (36.9 °C) 98 20 (!) 135/117 97 %      Temp src Heart Rate Source Patient Position BP Location FiO2 (%)   Oral Monitor Sitting Left arm --       Physical Exam  GENERAL:   Appears in no acute distress.  Very pleasant, nontoxic.  HENT: Nares patent.  EYES: No scleral icterus.  CV: Irregular rhythm, tachycardic rate.  No murmurs gallops rubs.  Heart rate running in the 130s to 160s while I am in the room.  1+ pitting edema bilateral pretibial regions.  RESPIRATORY: Normal effort.  Rales in lower lung fields, diminished in bases  ABDOMEN: Soft, nontender  MUSCULOSKELETAL: No deformities.   NEURO: Alert, moves all extremities, follows commands.  SKIN: Warm, dry, no rash visualized.      LAB RESULTS  Recent Results (from the past 24 hour(s))   ECG 12 Lead ED Triage Standing Order; SOA    Collection Time: 09/01/23  3:05 PM   Result Value Ref Range    QT Interval 270 ms    QTC Interval 451 ms   Comprehensive Metabolic Panel    Collection Time: 09/01/23  3:32 PM    Specimen: Blood   Result Value Ref Range    Glucose 107 (H) 65 - 99 mg/dL    BUN 17 8 - 23 mg/dL    Creatinine 0.89 0.76 - 1.27 mg/dL    Sodium 143 136 - 145 mmol/L    Potassium 4.3 3.5 - 5.2 mmol/L    Chloride 107 98 - 107 mmol/L    CO2 23.0 22.0 - 29.0 mmol/L    Calcium 8.9 8.6 - 10.5 mg/dL    Total Protein 6.8 6.0 - 8.5 g/dL    Albumin 4.1 3.5 - 5.2 g/dL    ALT (SGPT) 47 (H) 1 - 41 U/L    AST (SGOT) 39 1 - 40 U/L    Alkaline Phosphatase 116 39 - 117 U/L    Total Bilirubin 0.8 0.0 - 1.2 mg/dL    Globulin 2.7 gm/dL    A/G Ratio 1.5 g/dL    BUN/Creatinine Ratio 19.1 7.0 - 25.0    Anion Gap 13.0 5.0 - 15.0 mmol/L    eGFR 96.9 >60.0 mL/min/1.73   BNP    Collection Time: 09/01/23  3:32 PM    Specimen: Blood   Result Value Ref Range    proBNP 939.0 (H) 0.0 -  900.0 pg/mL   Single High Sensitivity Troponin T    Collection Time: 09/01/23  3:32 PM    Specimen: Blood   Result Value Ref Range    HS Troponin T 12 <15 ng/L   Green Top (Gel)    Collection Time: 09/01/23  3:32 PM   Result Value Ref Range    Extra Tube Hold for add-ons.    Lavender Top    Collection Time: 09/01/23  3:32 PM   Result Value Ref Range    Extra Tube hold for add-on    Gold Top - SST    Collection Time: 09/01/23  3:32 PM   Result Value Ref Range    Extra Tube Hold for add-ons.    Light Blue Top    Collection Time: 09/01/23  3:32 PM   Result Value Ref Range    Extra Tube Hold for add-ons.    CBC Auto Differential    Collection Time: 09/01/23  3:32 PM    Specimen: Blood   Result Value Ref Range    WBC 5.16 3.40 - 10.80 10*3/mm3    RBC 4.80 4.14 - 5.80 10*6/mm3    Hemoglobin 14.1 13.0 - 17.7 g/dL    Hematocrit 44.9 37.5 - 51.0 %    MCV 93.5 79.0 - 97.0 fL    MCH 29.4 26.6 - 33.0 pg    MCHC 31.4 (L) 31.5 - 35.7 g/dL    RDW 13.3 12.3 - 15.4 %    RDW-SD 45.9 37.0 - 54.0 fl    MPV 11.2 6.0 - 12.0 fL    Platelets 203 140 - 450 10*3/mm3    Neutrophil % 52.9 42.7 - 76.0 %    Lymphocyte % 35.7 19.6 - 45.3 %    Monocyte % 8.3 5.0 - 12.0 %    Eosinophil % 2.1 0.3 - 6.2 %    Basophil % 0.8 0.0 - 1.5 %    Immature Grans % 0.2 0.0 - 0.5 %    Neutrophils, Absolute 2.73 1.70 - 7.00 10*3/mm3    Lymphocytes, Absolute 1.84 0.70 - 3.10 10*3/mm3    Monocytes, Absolute 0.43 0.10 - 0.90 10*3/mm3    Eosinophils, Absolute 0.11 0.00 - 0.40 10*3/mm3    Basophils, Absolute 0.04 0.00 - 0.20 10*3/mm3    Immature Grans, Absolute 0.01 0.00 - 0.05 10*3/mm3    nRBC 0.0 0.0 - 0.2 /100 WBC   COVID-19, FLU A/B, RSV PCR - Swab, Nasopharynx    Collection Time: 09/01/23  3:32 PM    Specimen: Nasopharynx; Swab   Result Value Ref Range    COVID19 Not Detected Not Detected - Ref. Range    Influenza A PCR Not Detected Not Detected    Influenza B PCR Not Detected Not Detected    RSV, PCR Not Detected Not Detected   Heparin Anti-Xa    Collection  Time: 09/01/23  3:32 PM    Specimen: Blood   Result Value Ref Range    Heparin Anti-Xa (UFH) 0.10 (L) 0.30 - 0.70 IU/ml   Protime-INR    Collection Time: 09/01/23  3:32 PM    Specimen: Blood   Result Value Ref Range    Protime 15.5 (H) 12.2 - 14.5 Seconds    INR 1.21 (H) 0.89 - 1.12   aPTT    Collection Time: 09/01/23  3:32 PM    Specimen: Blood   Result Value Ref Range    PTT 30.3 (L) 60.0 - 90.0 seconds       If labs were ordered, I independently reviewed the results and considered them in treating the patient.        RADIOLOGY  XR Chest 2 View    Result Date: 9/1/2023  XR CHEST 2 VW Date of Exam: 9/1/2023 1:25 PM EDT Indication: cough Comparison: None available. Findings: Cardiomegaly. Prominent central pulmonary vessels. Small bilateral pleural effusions. Strandy atelectasis in the lung bases. No asymmetric infiltrate or edema     Impression: Cardiomegaly with pulmonary vascular congestion and small pleural effusions. Atelectasis present in the lung bases. No suspicious infiltrate or gross pulmonary edema Electronically Signed: Rojelio Huerta  9/1/2023 1:35 PM EDT  Workstation ID: [id number]     I ordered and independently reviewed the above noted radiographic studies.      I viewed images of chest x-ray performed prior to arrival in the emergency department interpreted by myself which showed changes of CHF to include bilateral pleural effusions and vascular congestion per my independent interpretation.    See radiologist's dictation for official interpretation.        PROCEDURES    Critical Care  Performed by: Yang Raygoza MD  Authorized by: Yang Raygoza MD     Critical care provider statement:     Critical care time (minutes):  45    Critical care time was exclusive of:  Separately billable procedures and treating other patients    Critical care was necessary to treat or prevent imminent or life-threatening deterioration of the following conditions:  Cardiac failure    Critical care was time spent  personally by me on the following activities:  Ordering and performing treatments and interventions, ordering and review of laboratory studies, ordering and review of radiographic studies, pulse oximetry, re-evaluation of patient's condition, review of old charts, obtaining history from patient or surrogate, examination of patient, evaluation of patient's response to treatment, discussions with consultants and development of treatment plan with patient or surrogate    ECG 12 Lead ED Triage Standing Order; SOA   Final Result   Test Reason : ED Triage Standing Order~   Blood Pressure :   */*   mmHG   Vent. Rate : 168 BPM     Atrial Rate : 135 BPM      P-R Int :   * ms          QRS Dur :  80 ms       QT Int : 270 ms       P-R-T Axes :   *  12   0 degrees      QTc Int : 451 ms      Atrial fibrillation with rapid ventricular response   Nonspecific T wave abnormality   Abnormal ECG   No previous ECGs available   Confirmed by ANDRA CRENSHAW MD ([id number]) on 9/1/2023 3:30:11 PM      Referred By: ED MD           Confirmed By: ANDRA CRENSHAW MD          MEDICATIONS GIVEN IN ER    Medications   sodium chloride 0.9 % flush 10 mL (has no administration in time range)   dilTIAZem (CARDIZEM) 125 mg in sodium chloride 0.9 % 125 mL infusion (15 mg/hr Intravenous Rate/Dose Change 9/1/23 1616)   heparin 99445 units/250 mL (100 units/mL) in 0.45 % NaCl infusion (7.5 Units/kg/hr × 132 kg Intravenous New Bag 9/1/23 1550)   Pharmacy to Dose Heparin (has no administration in time range)   labetalol (NORMODYNE,TRANDATE) 300 mg in sodium chloride 0.9 % 300 mL infusion (has no administration in time range)   dilTIAZem (CARDIZEM) injection 10 mg (10 mg Intravenous Given 9/1/23 1538)   furosemide (LASIX) injection 60 mg (60 mg Intravenous Given 9/1/23 1546)   heparin (porcine) injection 4,000 Units (4,000 Units Intravenous Given 9/1/23 1540)         MEDICAL DECISION MAKING, PROGRESS, and CONSULTS    All labs have been independently reviewed by me.   All radiology studies have been reviewed by me and the radiologist dictating the report.  All EKG's have been independently viewed and interpreted by me/my attending physician.      Discussion below represents my analysis of pertinent findings related to patient's condition, differential diagnosis, treatment plan and final disposition.      Differential diagnosis:    A-fib with RVR along with severe hypertension, CHF changes on chest x-ray.  Consider acute coronary syndrome.  Consider hypertensive emergency/urgency, etc.      Additional sources:    - Discussed/ obtained information from independent historians: Patient's wife provided some of the HPI.    - External (non-ED) record review: I reviewed the outside images from the urgent treatment center performed earlier today.  See my interpretation above for details.    - Chronic or social conditions impacting care: Does consume alcohol.    - Shared decision making: Patient and wife in full agreement with current plan for evaluation and treatment.      Orders placed during this visit:  Orders Placed This Encounter   Procedures    Critical Care    COVID PRE-OP / PRE-PROCEDURE SCREENING ORDER (NO ISOLATION) - Swab, Nasopharynx    COVID-19, FLU A/B, RSV PCR - Swab, Nasopharynx    [place] Draw    Comprehensive Metabolic Panel    BNP    Single High Sensitivity Troponin T    CBC Auto Differential    Heparin Anti-Xa    Protime-INR    aPTT    Heparin Anti-Xa    NPO Diet NPO Type: Strict NPO    Undress & Gown    Continuous Pulse Oximetry    Vital Signs    Notify Provider Platelet Count Less Than 74640    Stop Infusion & Notify Provider if Bleeding Occurs    Oxygen Therapy- Nasal Cannula; Titrate 1-6 LPM Per SpO2; 90 - 95%    ECG 12 Lead ED Triage Standing Order; SOA    Insert Peripheral IV    ICU / CCU Bed Request ([facility] / [facility] ONLY)    CBC & Differential    Green Top (Gel)    Lavender Top    Gold Top - SST    Gray Top    Light Blue Top    CBC & Differential         Additional  "orders considered but not ordered:  CTA chest.    ED Course:    Consultants:      ED Course as of 09/01/23 1741   Fri Sep 01, 2023   1524 Repeat blood pressure cycled by myself at bedside this time is 167/116.  I have ordered multiple medications to include diltiazem bolus and drip, heparin bolus and drip, furosemide bolus.  Anticipate admission. [MS]   1532 [id number] Score for Atrial Fibrillation Stroke Risk - MDCalc  Calculated on Sep 01 2023 3:32 PM  1 points -> Stroke risk was 0.6% per year in >90,000 patients (the [place] Atrial Fibrillation Cohort Study) and 0.9% risk of stroke/TIA/systemic embolism. One recommendation suggests a 0 score for men or 1 score for women (no clinical risk factors) is \"low\" risk and may not require anticoagulation; a 1 score for men or 2 score for women is \"low-moderate\" risk and should consider antiplatelet or anticoagulation; and a score =2 for men or =3 for women is \"moderate-high\" risk and should otherwise be an anticoagulation candidate. [MS]   1533 I will treat with the medications already ordered, then recheck.  If the patient remains severely hypertensive I anticipate starting a Cardene drip.  Patient will be admitted. [MS]   1656 I spoke with Abdulaziz our pharmacist in the emergency department.  We have maxed out on diltiazem and the patient is still hypertensive and tachycardic.  After discussion we decided to start him on labetalol drip.  This will be added to the diltiazem. [MS]   1658 I have paged Dr. Sanchez, intensivist in anticipation of admission to the ICU. [MS]   1706 I spoke with Dr. Moore.  Case discussed in detail.  He requests that I contact cardiology to get further recommendations before deciding on treatment/disposition.  I have paged , on-call cardiology. [MS]   4255 I spoke with  who is glad to come and see the patient and help manage.  I spoke with him about the drips that have already been ordered.  He will talk with Dr. Sanchez, " intensivist as well. [MS]      ED Course User Index  [MS] Yang Raygoza MD              Shared Decision Making:  After my consideration of clinical presentation and any laboratory/radiology studies obtained, I discussed the findings with the patient/patient representative who is in agreement with the treatment plan and the final disposition.   Risks and benefits of discharge and/or observation/admission were discussed.       AS OF 17:41 EDT VITALS:    BP - (!) 129/113  HR - (!) 141  TEMP - 98.4 °F (36.9 °C) (Oral)  O2 SATS - 91%                  DIAGNOSIS  Final diagnoses:   Atrial fibrillation with rapid ventricular response   Acute congestive heart failure, unspecified heart failure type   Hypertensive urgency         DISPOSITION  Admission      Please note that portions of this document were completed with voice recognition software.        Yang Raygoza MD  09/02/23 [id number]

## 2023-09-01 NOTE — H&P
INTENSIVIST   PROGRESS NOTE          SUBJECTIVE     David 62 y.o. male is followed for:Shortness of Breath       Paroxysmal atrial fibrillation    As an Intensivist, we provide an integrated approach to the ICU patient and family, medical management of comorbid conditions, including but not limited to electrolytes, glycemic control, organ dysfunction, lead interdisciplinary rounds and coordinate the care with all other services, including those from other specialists.     HPI:    David is a 62 y.o. male, who presented to this Hospital on 2023 because of dyspnea.     Initially he was seen at an Urgent Treatment Center, he had a CXR done and he was told to come to the ED because he had fluid in his lungs.    He was in A Fib with RVR.  He was hypertensive. BP was 172/144  He has noticed some dyspnea over past 2 days.  No previous h/o A Fib.    Dr. Raygoza called me to admit him to the ICU. He was started on Cardizem infusion but it had not controlled his rate. He was ready to start him on a labetalol infusion.    When I saw him in the ED, after being on Cardizem, Labetalol and doses of Digoxin, his HR was better. He was less dyspneic and more comfortable.    Temp  Min: 98.2 °F (36.8 °C)  Max: 98.4 °F (36.9 °C)     PMH: He  has a past medical history of Anxiety, GERD (gastroesophageal reflux disease), Hyperlipidemia, and Renal disorder.   PSxH: He  has a past surgical history that includes Circumcision, non- and Dental surgery (Bilateral).     Medications:  No current facility-administered medications on file prior to encounter.     Current Outpatient Medications on File Prior to Encounter   Medication Sig    aspirin 81 MG EC tablet Take 1 tablet by mouth Daily.    diclofenac (VOLTAREN) 75 MG EC tablet Take 1 tablet by mouth 2 (Two) Times a Day.    lovastatin (ALTOPREV) 40 MG 24 hr tablet Take 1 tablet by mouth Every Night.    multivitamin (Multiple Vitamin) tablet tablet Take 1 tablet by mouth Daily.     omeprazole (priLOSEC) 20 MG capsule Take 1 capsule by mouth Daily.    sertraline (ZOLOFT) 100 MG tablet Take 1 tablet by mouth Daily.    Turmeric 500 MG capsule Take 1 capsule by mouth Daily.    vitamin B-12 (CYANOCOBALAMIN) 500 MCG tablet Take 1 tablet by mouth Daily. OTC    [DISCONTINUED] ondansetron ODT (ZOFRAN-ODT) 4 MG disintegrating tablet Take 1 tablet by mouth Every 6 (Six) Hours As Needed for Nausea or Vomiting.    [DISCONTINUED] oxyCODONE-acetaminophen (PERCOCET) 7.5-325 MG per tablet Take 1 tablet by mouth Every 6 (Six) Hours As Needed for Severe Pain .        Allergies: He has No Known Allergies.   FH: His family history is not on file.   SH: He  reports that he has never smoked. He has never been exposed to tobacco smoke. He has never used smokeless tobacco. He reports current alcohol use. He reports that he does not use drugs.     The patient's relevant past medical, surgical and social history were reviewed and updated in Epic as appropriate.        History     Last Reviewed by Fco Moore MD on 2023 at  6:48 PM    Sections Reviewed    Medical, Family, Surgical, Tobacco, Alcohol, Drug Use, Sexual Activity,   Social Documentation    Problem list reviewed by Fco Moore MD on 2023 at  6:48 PM  Medicines reviewed by Fco Moore MD on 2023 at  6:48 PM  Allergies reviewed by Fco Moore MD on 2023 at  6:48 PM       Review of Systems  As described in the HPI.       OBJECTIVE     Vitals:  Temp: 98.4 °F (36.9 °C) (23 1458) Temp  Min: 98.2 °F (36.8 °C)  Max: 98.4 °F (36.9 °C)   Temp core:      BP: (!) 132/117 (23 1830) BP  Min: 121/104  Max: 172/144   MAP (non-invasive) Noninvasive MAP (mmHg): 113 (23 1750) Noninvasive MAP (mmHg)  Av.7  Min: 110  Max: 156   Pulse: (!) 130 (23 1830) Pulse  Min: 71  Max: 179   Resp: 20 (23) Resp  Min: 18  Max: 20   SpO2: 91 % (23 1830) SpO2  Min: 82 %  Max: 100 %   Device: room air (23)     Flow Rate:   No data recorded         09/01/23  1458   Weight: 132 kg (290 lb)        Intake/Ouptut 24 hrs (7:00AM - 6:59 AM)  Intake & Output (last 3 days)         08/29 0701 08/30 0700 08/30 0701 08/31 0700 08/31 0701 09/01 0700 09/01 0701 09/02 0700    Urine (mL/kg/hr)    4200    Total Output    4200    Net    -4200                    Medications (drips):  dilTIAZem, Last Rate: 15 mg/hr (09/01/23 1616)  heparin, Last Rate: 7.5 Units/kg/hr (09/01/23 1550)  labetalol, Last Rate: 0.5 mg/min (09/01/23 1824)  Pharmacy to Dose Heparin      Physical Examination  Telemetry:  Rhythm: atrial rhythm (09/01/23 1723)  Atrial Rhythm: atrial fibrillation (09/01/23 1723)      Constitutional:  No acute distress.   Cardiovascular: IRR.    Respiratory: Normal breath sounds  (+) Crackles   Abdominal:  Soft with no tenderness.   Extremities: 1+ Edema   Neurological:   Alert, Oriented, Cooperative.                 Results Reviewed:  Laboratory  Microbiology  Radiology  Pathology    Hematology:  Results from last 7 days   Lab Units 09/01/23  1532   WBC 10*3/mm3 5.16   HEMOGLOBIN g/dL 14.1   MCV fL 93.5   PLATELETS 10*3/mm3 203     Results from last 7 days   Lab Units 09/01/23  1532   IMM GRAN % % 0.2   NEUTROS ABS 10*3/mm3 2.73   LYMPHS ABS 10*3/mm3 1.84   MONOS ABS 10*3/mm3 0.43   EOS ABS 10*3/mm3 0.11   BASOS ABS 10*3/mm3 0.04     Chemistry:  Estimated Creatinine Clearance: 127.8 mL/min (by C-G formula based on SCr of 0.89 mg/dL).    Results from last 7 days   Lab Units 09/01/23  1532   SODIUM mmol/L 143   POTASSIUM mmol/L 4.3   CHLORIDE mmol/L 107   CO2 mmol/L 23.0   BUN mg/dL 17   CREATININE mg/dL 0.89   GLUCOSE mg/dL 107*     Results from last 7 days   Lab Units 09/01/23  1532   CALCIUM mg/dL 8.9       Hepatic Panel:  Results from last 7 days   Lab Units 09/01/23  1532   ALBUMIN g/dL 4.1   BILIRUBIN mg/dL 0.8   AST (SGOT) U/L 39   ALT (SGPT) U/L 47*   ALK PHOS U/L 116        Coagulation Labs:  Results from last 7 days   Lab  Units 09/01/23  1532   PROTIME Seconds 15.5*   INR  1.21*   APTT seconds 30.3*        Cardiac Labs:  Results from last 7 days   Lab Units 09/01/23  1532   PROBNP pg/mL 939.0*   HSTROP T ng/L 12       COVID-19  Lab Results   Component Value Date    COVID19 Not Detected 09/01/2023     Images:  XR Chest 2 View    Result Date: 9/1/2023  Impression: Cardiomegaly with pulmonary vascular congestion and small pleural effusions. Atelectasis present in the lung bases. No suspicious infiltrate or gross pulmonary edema Electronically Signed: Rojelio Huerta  9/1/2023 1:35 PM EDT  Workstation ID: [id number]     Echo:      Results: Reviewed.  I reviewed the patient's new laboratory and imaging results.  I independently reviewed the patient's new images.    Medications: Reviewed.    Assessment    A/P     Hospital:  LOS: 0 days   ICU: Patient does not have an ICU stay during this admission.     Active Hospital Problems    Diagnosis  POA    Paroxysmal atrial fibrillation [I48.0]  Unknown     David is a 62 y.o. male admitted on 9/1/2023 with No admission diagnoses are documented for this encounter.    Assessment/Management/Treatment Plan:    A Fib, Heart Failure and Hypertension - new onset. Reason for admission  Cardiovascular  HFmrEF (EF 41-49%)   HTN urgency - on admission  Dyslipidemia   GI/Hepatology  GERD  Anxiety  Edema Lower Extremities  Endocrine  Body mass index is 35.3 kg/m². Obese Class II: 35-39.9kg/m2  No history of T2 Diabetes    No results found for: HGBA1C        Diet: NPO Diet NPO Type: Strict NPO  Diet: Cardiac Diets; Healthy Heart (2-3 Na+); Texture: Regular Texture (IDDSI 7); Fluid Consistency: Thin (IDDSI 0)  No active supplement orders      Advance Directives: There are no questions and answers to display.        DVT prophylaxis:  Medical DVT prophylaxis orders are present.       In brief:  Discussed with Dr. Husain  Hemodynamic support  Diuresis, already started in ED  F/U PCXR in AM  May need ARIANE and ECV  tomorrow.  Venous duplex LE  Disposition: Admit to ICU    Plan of care and goals reviewed during interdisciplinary rounds.  I discussed the patient's findings and my recommendations with patient and consulting provider    MDM:    Problem(s) High due to: Acute or Chronic illness or injury that may poses a threat to life or bodily function  Data: High due to: Review of prior external records from each unique source, Review of the result(s) of each unique test, Ordering of each unique test, and Discuss management or test interpretation with external physician or NPP (not separately reported)    High      [x] Primary Attending Intensive Care Medicine - Nutrition Support   [] Consultant    Copied text in this note has been reviewed and is accurate as of 09/01/23

## 2023-09-01 NOTE — PROGRESS NOTES
HEPARIN INFUSION  David Jean Baptiste is a  62 y.o. male receiving heparin infusion.     Therapy for (VTE/Cardiac): Cardiac  Patient Weight: 132 kg  Initial Bolus (Y/N): Y  Any Bolus (Y/N): Y       Signs or Symptoms of Bleeding:     Cardiac or Other (Not VTE)   Initial Bolus: 60 units/kg (Max 4,000 units)  Initial rate: 12 units/kg/hr (Max 1,000 units/hr)   Anti Xa Rebolus Infusion Hold time Change infusion Dose (Units/kg/hr) Next Anti Xa or aPTT Level Due   < 0.11 50 Units/kg  (4000 Units Max) None Increase by  3 Units/kg/hr 6 hours   0.11- 0.19 25 Units/kg  (2000 Units Max) None Increase by  2 Units/kg/hr 6 hours   0.2 - 0.29 0 None Increase by  1 Units/kg/hr 6 hours   0.3 - 0.5 0 None No Change 6 hours (after 2 consecutive levels in range check qAM)   0.51 - 0.6 0 None Decrease by  1 Units/kg/hr 6 hours   0.61 - 0.8 0 30 Minutes Decrease by  2 Units/kg/hr 6 hours   0.81 - 1 0 60 Minutes Decrease by  3 Units/kg/hr 6 hours   >1 0 Hold  After Anti Xa less than 0.5 decrease previous rate by  4 Units/kg/hr  Every 2 hours until Anti Xa  less than 0.5 then when infusion restarts in 6 hours                Date   Time   Anti-Xa Current Rate (Unit/kg/hr) Bolus   (Units) Rate Change   (Unit/kg/hr) New Rate (Unit/kg/hr) Next   Anti-Xa Comments  Pump Check Daily   9/1 1530 pending 0 4000 +7.5 7.5 2200 DW RN                                                                                                                                                                                                                                 Abdulaziz Pierce, PharmD  9/1/2023  15:29 EDT

## 2023-09-02 ENCOUNTER — APPOINTMENT (OUTPATIENT)
Dept: GENERAL RADIOLOGY | Facility: HOSPITAL | Age: 63
DRG: 309 | End: 2023-09-02
Payer: COMMERCIAL

## 2023-09-02 ENCOUNTER — APPOINTMENT (OUTPATIENT)
Dept: CARDIOLOGY | Facility: HOSPITAL | Age: 63
DRG: 309 | End: 2023-09-02
Payer: COMMERCIAL

## 2023-09-02 LAB
ALBUMIN SERPL-MCNC: 3.9 G/DL (ref 3.5–5.2)
ALBUMIN/GLOB SERPL: 1.6 G/DL
ALP SERPL-CCNC: 110 U/L (ref 39–117)
ALT SERPL W P-5'-P-CCNC: 41 U/L (ref 1–41)
ANION GAP SERPL CALCULATED.3IONS-SCNC: 11 MMOL/L (ref 5–15)
AST SERPL-CCNC: 32 U/L (ref 1–40)
BASOPHILS # BLD AUTO: 0.04 10*3/MM3 (ref 0–0.2)
BASOPHILS NFR BLD AUTO: 0.8 % (ref 0–1.5)
BILIRUB SERPL-MCNC: 0.7 MG/DL (ref 0–1.2)
BUN SERPL-MCNC: 17 MG/DL (ref 8–23)
BUN/CREAT SERPL: 20 (ref 7–25)
CALCIUM SPEC-SCNC: 8.7 MG/DL (ref 8.6–10.5)
CHLORIDE SERPL-SCNC: 105 MMOL/L (ref 98–107)
CO2 SERPL-SCNC: 28 MMOL/L (ref 22–29)
CREAT SERPL-MCNC: 0.85 MG/DL (ref 0.76–1.27)
DEPRECATED RDW RBC AUTO: 44.9 FL (ref 37–54)
EGFRCR SERPLBLD CKD-EPI 2021: 98.2 ML/MIN/1.73
EOSINOPHIL # BLD AUTO: 0.13 10*3/MM3 (ref 0–0.4)
EOSINOPHIL NFR BLD AUTO: 2.5 % (ref 0.3–6.2)
ERYTHROCYTE [DISTWIDTH] IN BLOOD BY AUTOMATED COUNT: 13.2 % (ref 12.3–15.4)
GLOBULIN UR ELPH-MCNC: 2.4 GM/DL
GLUCOSE SERPL-MCNC: 106 MG/DL (ref 65–99)
HCT VFR BLD AUTO: 42.8 % (ref 37.5–51)
HGB BLD-MCNC: 13.7 G/DL (ref 13–17.7)
IMM GRANULOCYTES # BLD AUTO: 0.01 10*3/MM3 (ref 0–0.05)
IMM GRANULOCYTES NFR BLD AUTO: 0.2 % (ref 0–0.5)
LYMPHOCYTES # BLD AUTO: 1.64 10*3/MM3 (ref 0.7–3.1)
LYMPHOCYTES NFR BLD AUTO: 31.8 % (ref 19.6–45.3)
MAGNESIUM SERPL-MCNC: 2 MG/DL (ref 1.6–2.4)
MCH RBC QN AUTO: 29.7 PG (ref 26.6–33)
MCHC RBC AUTO-ENTMCNC: 32 G/DL (ref 31.5–35.7)
MCV RBC AUTO: 92.6 FL (ref 79–97)
MONOCYTES # BLD AUTO: 0.39 10*3/MM3 (ref 0.1–0.9)
MONOCYTES NFR BLD AUTO: 7.6 % (ref 5–12)
NEUTROPHILS NFR BLD AUTO: 2.94 10*3/MM3 (ref 1.7–7)
NEUTROPHILS NFR BLD AUTO: 57.1 % (ref 42.7–76)
NRBC BLD AUTO-RTO: 0 /100 WBC (ref 0–0.2)
NT-PROBNP SERPL-MCNC: 667.2 PG/ML (ref 0–900)
PHOSPHATE SERPL-MCNC: 3.3 MG/DL (ref 2.5–4.5)
PLATELET # BLD AUTO: 200 10*3/MM3 (ref 140–450)
PMV BLD AUTO: 11 FL (ref 6–12)
POTASSIUM SERPL-SCNC: 3.8 MMOL/L (ref 3.5–5.2)
PROT SERPL-MCNC: 6.3 G/DL (ref 6–8.5)
RBC # BLD AUTO: 4.62 10*6/MM3 (ref 4.14–5.8)
SODIUM SERPL-SCNC: 144 MMOL/L (ref 136–145)
WBC NRBC COR # BLD: 5.15 10*3/MM3 (ref 3.4–10.8)

## 2023-09-02 PROCEDURE — 93321 DOPPLER ECHO F-UP/LMTD STD: CPT | Performed by: INTERNAL MEDICINE

## 2023-09-02 PROCEDURE — 83880 ASSAY OF NATRIURETIC PEPTIDE: CPT | Performed by: INTERNAL MEDICINE

## 2023-09-02 PROCEDURE — 80053 COMPREHEN METABOLIC PANEL: CPT | Performed by: INTERNAL MEDICINE

## 2023-09-02 PROCEDURE — 93005 ELECTROCARDIOGRAM TRACING: CPT | Performed by: INTERNAL MEDICINE

## 2023-09-02 PROCEDURE — 25010000002 AMIODARONE IN DEXTROSE 5% 360-4.14 MG/200ML-% SOLUTION: Performed by: INTERNAL MEDICINE

## 2023-09-02 PROCEDURE — 85025 COMPLETE CBC W/AUTO DIFF WBC: CPT | Performed by: INTERNAL MEDICINE

## 2023-09-02 PROCEDURE — 93325 DOPPLER ECHO COLOR FLOW MAPG: CPT

## 2023-09-02 PROCEDURE — 92960 CARDIOVERSION ELECTRIC EXT: CPT | Performed by: INTERNAL MEDICINE

## 2023-09-02 PROCEDURE — 93010 ELECTROCARDIOGRAM REPORT: CPT | Performed by: INTERNAL MEDICINE

## 2023-09-02 PROCEDURE — 71045 X-RAY EXAM CHEST 1 VIEW: CPT

## 2023-09-02 PROCEDURE — 25010000002 MIDAZOLAM PER 1 MG: Performed by: INTERNAL MEDICINE

## 2023-09-02 PROCEDURE — 93325 DOPPLER ECHO COLOR FLOW MAPG: CPT | Performed by: INTERNAL MEDICINE

## 2023-09-02 PROCEDURE — 93321 DOPPLER ECHO F-UP/LMTD STD: CPT

## 2023-09-02 PROCEDURE — 93312 ECHO TRANSESOPHAGEAL: CPT | Performed by: INTERNAL MEDICINE

## 2023-09-02 PROCEDURE — 25010000002 DIGOXIN PER 500 MCG: Performed by: INTERNAL MEDICINE

## 2023-09-02 PROCEDURE — 83735 ASSAY OF MAGNESIUM: CPT | Performed by: INTERNAL MEDICINE

## 2023-09-02 PROCEDURE — 25010000002 FUROSEMIDE PER 20 MG: Performed by: INTERNAL MEDICINE

## 2023-09-02 PROCEDURE — 84100 ASSAY OF PHOSPHORUS: CPT | Performed by: INTERNAL MEDICINE

## 2023-09-02 PROCEDURE — 5A2204Z RESTORATION OF CARDIAC RHYTHM, SINGLE: ICD-10-PCS | Performed by: INTERNAL MEDICINE

## 2023-09-02 PROCEDURE — 99232 SBSQ HOSP IP/OBS MODERATE 35: CPT | Performed by: INTERNAL MEDICINE

## 2023-09-02 PROCEDURE — 25010000002 LABETALOL 5 MG/ML SOLUTION 20 ML VIAL: Performed by: EMERGENCY MEDICINE

## 2023-09-02 PROCEDURE — 93312 ECHO TRANSESOPHAGEAL: CPT

## 2023-09-02 PROCEDURE — 25010000002 AMIODARONE IN DEXTROSE 5% 150-4.21 MG/100ML-% SOLUTION: Performed by: INTERNAL MEDICINE

## 2023-09-02 RX ORDER — FENTANYL CITRATE 50 UG/ML
INJECTION, SOLUTION INTRAMUSCULAR; INTRAVENOUS
Status: DISCONTINUED
Start: 2023-09-02 | End: 2023-09-02 | Stop reason: WASHOUT

## 2023-09-02 RX ORDER — MIDAZOLAM HYDROCHLORIDE 1 MG/ML
6 INJECTION INTRAMUSCULAR; INTRAVENOUS
Status: COMPLETED | OUTPATIENT
Start: 2023-09-02 | End: 2023-09-02

## 2023-09-02 RX ADMIN — SODIUM CHLORIDE 500 ML: 9 INJECTION, SOLUTION INTRAVENOUS at 11:54

## 2023-09-02 RX ADMIN — PANTOPRAZOLE SODIUM 40 MG: 40 TABLET, DELAYED RELEASE ORAL at 06:34

## 2023-09-02 RX ADMIN — AMIODARONE HYDROCHLORIDE 1 MG/MIN: 1.8 INJECTION, SOLUTION INTRAVENOUS at 13:00

## 2023-09-02 RX ADMIN — DIGOXIN 250 MCG: 0.25 INJECTION INTRAMUSCULAR; INTRAVENOUS at 01:03

## 2023-09-02 RX ADMIN — RIVAROXABAN 20 MG: 20 TABLET, FILM COATED ORAL at 18:26

## 2023-09-02 RX ADMIN — DILTIAZEM HYDROCHLORIDE 10 MG/HR: 5 INJECTION INTRAVENOUS at 00:01

## 2023-09-02 RX ADMIN — ATORVASTATIN CALCIUM 10 MG: 10 TABLET, FILM COATED ORAL at 09:19

## 2023-09-02 RX ADMIN — MIDAZOLAM HYDROCHLORIDE 4 MG: 2 INJECTION, SOLUTION INTRAMUSCULAR; INTRAVENOUS at 11:14

## 2023-09-02 RX ADMIN — FUROSEMIDE 40 MG: 10 INJECTION, SOLUTION INTRAMUSCULAR; INTRAVENOUS at 09:19

## 2023-09-02 RX ADMIN — AMIODARONE HYDROCHLORIDE 0.5 MG/MIN: 1.8 INJECTION, SOLUTION INTRAVENOUS at 18:49

## 2023-09-02 RX ADMIN — METOPROLOL TARTRATE 25 MG: 25 TABLET, FILM COATED ORAL at 18:26

## 2023-09-02 RX ADMIN — METHOHEXITAL SODIUM 40 MG: 500 INJECTION, POWDER, LYOPHILIZED, FOR SOLUTION INTRAMUSCULAR; INTRAVENOUS; RECTAL at 11:15

## 2023-09-02 RX ADMIN — METOPROLOL TARTRATE 25 MG: 25 TABLET, FILM COATED ORAL at 06:34

## 2023-09-02 RX ADMIN — LABETALOL HYDROCHLORIDE 0.5 MG/MIN: 5 INJECTION INTRAVENOUS at 04:44

## 2023-09-02 RX ADMIN — AMIODARONE HYDROCHLORIDE 150 MG: 1.5 INJECTION, SOLUTION INTRAVENOUS at 12:49

## 2023-09-02 RX ADMIN — SERTRALINE HYDROCHLORIDE 100 MG: 100 TABLET ORAL at 09:19

## 2023-09-02 NOTE — PROCEDURES
Diagnosis:  Atrial fibrillation     PROCEDURE PERFORMED: Transesophageal echocardiogram external electrical cardioversion.    Anesthesia: Sedation with:  1. 4 mg Versed  2. 40 mg Brevital    Estimated Blood Loss: Less than 10 mL     Specimens: None    I supervised and directed an independent trained observer with the assistance of monitoring the patient's level of consciousness and physiological status throughout the procedure.  Intraoperative service time of 20 minutes    PROCEDURE IN DETAIL: The patient was brought into the CVOU in a fasting  state. The patient was given moderate sedation during which he received 200  joules of external electrical cardioversion  x 2.  The patient briefly achieve sinus rhythm but would quickly returned to atrial fibrillation      IMPRESSION: Patient would briefly achieve sinus rhythm, but had early return of A-fib

## 2023-09-02 NOTE — PLAN OF CARE
Goal Outcome Evaluation:      Lasix x1. UOP ~1800  ARIANE/ ECV. Remains in AFIB w/ rates 60-120s. Amio gtt initiated. Currently infusing @ 0.5 mg/min. Xarelto PO scheduled.  Post ARIANE/ECV SBP 80s-90s. 500 mL bolus administered. SBP responded to bolus. SBPs currently 100s-130s.   A&Ox4  2LNC.  Transfer to telemetry orders.   Family updated at bedside.   Pt denies further needs.

## 2023-09-02 NOTE — PROGRESS NOTES
Patient is on Rivaroxaban.  Education provided on 9/1/23 verbally and in writing.  Information provided includes effects of medication, drug-drug and drug-food interactions, and signs/symptoms of bleeding and clotting.  Patient verbalized understanding through teach back.  All pertinent questions were answered.     Thanks  Abdulaziz Pierce, PharmD, BCPS  9/1/2023  22:12 EDT

## 2023-09-02 NOTE — PROGRESS NOTES
"[facility] Progress Note     LOS: 1 day   Patient Care Team:  Deniz Chang MD as PCP - General (Family Medicine)  PCP:  Deniz Chang MD    Chief Complaint: Follow-up atrial fibrillation, acute heart failure    Subjective: Patient underwent attempted ARIANE/cardioversion today.  Sinus rhythm was briefly achieved but he had early return of A-fib.  He is diuresing with Lasix.  With return of A-fib discussed the course of amiodarone with the patient and his wife      Review of Systems:   All systems have been reviewed and are negative with the exception of those mentioned above.      Objective:    Vital Sign Min/Max for last 24 hours  Temp  Min: 97.7 °F (36.5 °C)  Max: 98.4 °F (36.9 °C)   BP  Min: 79/66  Max: 172/144   Pulse  Min: 44  Max: 179   Resp  Min: 16  Max: 20   SpO2  Min: 82 %  Max: 100 %   No data recorded   Weight  Min: 99.3 kg (219 lb)  Max: 132 kg (291 lb 0.1 oz)     Flowsheet Rows      Flowsheet Row First Filed Value   Admission Height 193 cm (76\") Documented at 09/01/2023 1458   Admission Weight 132 kg (290 lb) Documented at 09/01/2023 1458            Telemetry: Atrial fibrillation with fairly controlled rate      Intake/Output Summary (Last 24 hours) at 9/2/2023 1230  Last data filed at 9/2/2023 1045  Gross per 24 hour   Intake --   Output 6600 ml   Net -6600 ml     Intake & Output (last 3 days)         08/30 0701  08/31 0700 08/31 0701  09/01 0700 09/01 0701  09/02 0700 09/02 0701  09/03 0700    Urine (mL/kg/hr)   4800 1800 (2.5)    Total Output   4800 1800    Net   -4800 -1800                     Physical Exam:  Pulmonary:      Effort: Pulmonary effort is normal.      Breath sounds: Rales present.   Cardiovascular:      Normal rate. Irregular rhythm.      Murmurs: There is no murmur.   Edema:     Pretibial: bilateral 1+ edema of the pretibial area.  Neurological:      General: No focal deficit present.        LABS/DIAGNOSTIC DATA:  Results from " last 7 days   Lab Units 09/02/23  0557 09/01/23  1532   WBC 10*3/mm3 5.15 5.16   HEMOGLOBIN g/dL 13.7 14.1   HEMATOCRIT % 42.8 44.9   PLATELETS 10*3/mm3 200 203     Lab Results   Lab Value Date/Time    TROPONINT 12 09/01/2023 1532     Results from last 7 days   Lab Units 09/01/23  1532   INR  1.21*   APTT seconds 30.3*     Results from last 7 days   Lab Units 09/02/23  0557 09/01/23  1532   SODIUM mmol/L 144 143   POTASSIUM mmol/L 3.8 4.3   CHLORIDE mmol/L 105 107   CO2 mmol/L 28.0 23.0   BUN mg/dL 17 17   CREATININE mg/dL 0.85 0.89   CALCIUM mg/dL 8.7 8.9   BILIRUBIN mg/dL 0.7 0.8   ALK PHOS U/L 110 116   ALT (SGPT) U/L 41 47*   AST (SGOT) U/L 32 39   GLUCOSE mg/dL 106* 107*     Results from last 7 days   Lab Units 09/01/23  1532   HEMOGLOBIN A1C % 5.80*         Results from last 7 days   Lab Units 09/01/23  1532   TSH uIU/mL 1.650           Medication Review:   amiodarone, 150 mg, Intravenous, Once  atorvastatin, 10 mg, Oral, Daily  furosemide, 40 mg, Intravenous, Daily  metoprolol tartrate, 25 mg, Oral, Q12H  pantoprazole, 40 mg, Oral, Q AM  rivaroxaban, 20 mg, Oral, Daily With Dinner  sertraline, 100 mg, Oral, Daily       amiodarone, 1 mg/min   Followed by  amiodarone, 0.5 mg/min           Atrial fibrillation    Paroxysmal atrial fibrillation      Assessment/Plan:  Atrial fibrillation  Attempted cardioversion today but with early return of A-fib  We will give a bolus of IV amiodarone followed by drip today  Continue metoprolol as blood pressure allows with hold parameters  Continue Xarelto for anticoagulation, [id number] 2  If the patient does not achieve sinus rhythm with IV amiodarone would plan on discharge and return in 1 to 2 weeks for outpatient cardioversion  EF appears to be 41 to 45% ARIANE  Acute heart failure with mildly reduced ejection fraction  Secondary to A-fib with RVR  Having good output with IV Lasix continue 40 mg IV daily  Postprocedural hypotension  Suspect hypotension related to sedation  post procedure  Did give back 500 cc of IV saline    If blood pressure improved this afternoon can transfer out of ICU.          Roque Husain MD [facility]  09/02/23

## 2023-09-02 NOTE — PROGRESS NOTES
INTENSIVIST   PROGRESS NOTE     Hospital:  LOS: 1 day     Mr. David Jean Baptiste, 62 y.o. male is followed for a Chief Complaint of: Atrial Fibrillation      Subjective   S     Interval History:  Now if rate controlled Afib. All drips are off.        The patient's relevant past medical, surgical and social history were reviewed and updated in Epic as appropriate.      ROS:   Constitutional: Negative for fever.   Respiratory: Negative for dyspnea.   Cardiovascular: Negative for chest pain.   Gastrointestinal: Negative for  nausea, vomiting and diarrhea.     Objective   O     Vitals:  Temp  Min: 97.7 °F (36.5 °C)  Max: 98.4 °F (36.9 °C)  BP  Min: 79/66  Max: 172/144  Pulse  Min: 44  Max: 179  Resp  Min: 16  Max: 20  SpO2  Min: 82 %  Max: 100 % Flow (L/min)  Min: 2  Max: 2    Intake/Ouptut 24 hrs (7:00AM - 6:59 AM)  Intake & Output (last 3 days)         08/30 0701 08/31 0700 08/31 0701  09/01 0700 09/01 0701  09/02 0700 09/02 0701  09/03 0700    Urine (mL/kg/hr)   4800 1200 (2.8)    Total Output   4800 1200    Net   -4800 -1200                      Physical Examination  Telemetry:  Atrial fibrillation.   Constitutional:  No acute distress.  Sitting up in bed.    Eyes: No scleral icterus.   PERRL, EOM intact.    Neck:  Supple, FROM   Cardiovascular: Irregularly irregular. Normal heart sounds.  No murmurs, gallop or rub.   Respiratory: No respiratory distress. Normal respiratory effort.  Normal breath sounds  Clear to auscultation   Abdominal:  Soft. No masses. Nontender. No distension. No HSM.   Extremities: No digital cyanosis. No clubbing.  1+ peripheral edema.   Skin: No rashes, lesions or ulcers   Neurological:   Alert and Oriented to person, place, and time.             Results from last 7 days   Lab Units 09/02/23  0557 09/01/23  1532   WBC 10*3/mm3 5.15 5.16   HEMOGLOBIN g/dL 13.7 14.1   MCV fL 92.6 93.5   PLATELETS 10*3/mm3 200 203     Results from last 7 days   Lab Units 09/02/23  0557 09/01/23  1532    SODIUM mmol/L 144 143   POTASSIUM mmol/L 3.8 4.3   CO2 mmol/L 28.0 23.0   CREATININE mg/dL 0.85 0.89   GLUCOSE mg/dL 106* 107*   MAGNESIUM mg/dL 2.0 2.0   PHOSPHORUS mg/dL 3.3  --      Estimated Creatinine Clearance: 133.8 mL/min (by C-G formula based on SCr of 0.85 mg/dL).  Results from last 7 days   Lab Units 09/02/23  0557 09/01/23  1532   ALK PHOS U/L 110 116   BILIRUBIN mg/dL 0.7 0.8   ALT (SGPT) U/L 41 47*   AST (SGOT) U/L 32 39             Images:  Imaging Results (Last 24 Hours)       Procedure Component Value Units Date/Time    XR Chest 1 View [[id number]] Collected: 09/02/23 0848     Updated: 09/02/23 0854    Narrative:      XR CHEST 1 VW    Date of Exam: 9/2/2023 3:45 AM EDT    Indication: A Fib, heart failure    Comparison: 9/1/2023 and prior    Findings:  Study is limited by overlying support and monitoring apparatus. Heart size is enlarged. This is more prominent than the comparison likely related to lower lung volumes. Pulm vascularity demonstrates mild pulmonary vascular congestion. Increased hazy and   interstitial opacities noted dependently are accentuated by lower lung volumes. There is mild blunting at the right costophrenic angle and minimal focal dependent opacity. Valuation of the retrocardiac aspect of the left base is limited. Osseous   structures are stable.        Impression:      Impression:    1. Cardiomegaly and mild pulmonary vascular congestion with vascular crowding at the lung bases. Findings are accentuated by low lung volumes.    2. Dependent opacities which represent atelectasis and/or mild degree of pulmonary edema and small bilateral pleural effusions noted.    2. Findings appear more prominent in the comparison although this is likely in part due to differences in technique      Electronically Signed: Jaswant Cisneros MD    9/2/2023 8:51 AM EDT    Workstation ID: [id number]               Results: Reviewed.  I reviewed the patient's new laboratory and imaging results.  I  independently reviewed the patient's new images.    Medications: Reviewed.    Assessment & Plan   A / P     Mr. Jean Baptiste is a 61yo M with a history of arthritis, depression and HLD who presented to the [facility] ED on 9/1/23 with shortness of breath. He was found to be in Afib with RVR. He was started on a diltiazem drip and received IV Lasix in the ED. He was also started on IV Labetalol drip for hypertension.     He was evaluated by Cardiology who gave IV Digoxin and started the patient on PO Metoprolol. Heparin drip was initially started but changed to Xarelto.     Nutrition:   NPO Diet NPO Type: Strict NPO  Advance Directives:   Code Status and Medical Interventions:   Ordered at: 09/01/23 9454     Code Status (Patient has no pulse and is not breathing):    CPR (Attempt to Resuscitate)     Medical Interventions (Patient has pulse or is breathing):    Full Support       Active Hospital Problems    Diagnosis     **Atrial fibrillation     Paroxysmal atrial fibrillation        Assessment / Plan:    Cardiology following and planning for ARIANE with Cardioversion.   Continue Xarelto  IV Lasix again this AM.   PO Metoprolol.   NPO. Cardiac diet after cardioversion.  AM labs    High level of risk due to:  severe exacerbation of chronic illness and illness with threat to life or bodily function.    Plan of care and goals reviewed during interdisciplinary rounds.  I discussed the patient's findings and my recommendations with patient and nursing staff      Iesha Wilcox,     Intensive Care Medicine and Pulmonary Medicine

## 2023-09-03 LAB
ANION GAP SERPL CALCULATED.3IONS-SCNC: 11 MMOL/L (ref 5–15)
BUN SERPL-MCNC: 18 MG/DL (ref 8–23)
BUN/CREAT SERPL: 18.4 (ref 7–25)
CALCIUM SPEC-SCNC: 8.8 MG/DL (ref 8.6–10.5)
CHLORIDE SERPL-SCNC: 105 MMOL/L (ref 98–107)
CO2 SERPL-SCNC: 27 MMOL/L (ref 22–29)
CREAT SERPL-MCNC: 0.98 MG/DL (ref 0.76–1.27)
DEPRECATED RDW RBC AUTO: 45 FL (ref 37–54)
EGFRCR SERPLBLD CKD-EPI 2021: 87.2 ML/MIN/1.73
ERYTHROCYTE [DISTWIDTH] IN BLOOD BY AUTOMATED COUNT: 13.2 % (ref 12.3–15.4)
GLUCOSE SERPL-MCNC: 91 MG/DL (ref 65–99)
HCT VFR BLD AUTO: 44.7 % (ref 37.5–51)
HGB BLD-MCNC: 14.2 G/DL (ref 13–17.7)
MAGNESIUM SERPL-MCNC: 2.1 MG/DL (ref 1.6–2.4)
MCH RBC QN AUTO: 29.3 PG (ref 26.6–33)
MCHC RBC AUTO-ENTMCNC: 31.8 G/DL (ref 31.5–35.7)
MCV RBC AUTO: 92.2 FL (ref 79–97)
PHOSPHATE SERPL-MCNC: 4.6 MG/DL (ref 2.5–4.5)
PLATELET # BLD AUTO: 208 10*3/MM3 (ref 140–450)
PMV BLD AUTO: 11.8 FL (ref 6–12)
POTASSIUM SERPL-SCNC: 3.8 MMOL/L (ref 3.5–5.2)
RBC # BLD AUTO: 4.85 10*6/MM3 (ref 4.14–5.8)
SODIUM SERPL-SCNC: 143 MMOL/L (ref 136–145)
WBC NRBC COR # BLD: 5.6 10*3/MM3 (ref 3.4–10.8)

## 2023-09-03 PROCEDURE — 25010000002 FUROSEMIDE PER 20 MG: Performed by: INTERNAL MEDICINE

## 2023-09-03 PROCEDURE — 99232 SBSQ HOSP IP/OBS MODERATE 35: CPT | Performed by: PHYSICIAN ASSISTANT

## 2023-09-03 PROCEDURE — 83735 ASSAY OF MAGNESIUM: CPT | Performed by: INTERNAL MEDICINE

## 2023-09-03 PROCEDURE — 84100 ASSAY OF PHOSPHORUS: CPT | Performed by: INTERNAL MEDICINE

## 2023-09-03 PROCEDURE — 25010000002 AMIODARONE IN DEXTROSE 5% 360-4.14 MG/200ML-% SOLUTION: Performed by: INTERNAL MEDICINE

## 2023-09-03 PROCEDURE — 80048 BASIC METABOLIC PNL TOTAL CA: CPT | Performed by: INTERNAL MEDICINE

## 2023-09-03 PROCEDURE — 85027 COMPLETE CBC AUTOMATED: CPT | Performed by: INTERNAL MEDICINE

## 2023-09-03 RX ORDER — METOPROLOL TARTRATE 50 MG/1
50 TABLET, FILM COATED ORAL EVERY 12 HOURS SCHEDULED
Status: DISCONTINUED | OUTPATIENT
Start: 2023-09-03 | End: 2023-09-04 | Stop reason: HOSPADM

## 2023-09-03 RX ORDER — FUROSEMIDE 40 MG/1
40 TABLET ORAL DAILY
Status: DISCONTINUED | OUTPATIENT
Start: 2023-09-04 | End: 2023-09-04 | Stop reason: HOSPADM

## 2023-09-03 RX ORDER — AMIODARONE HYDROCHLORIDE 200 MG/1
200 TABLET ORAL 3 TIMES DAILY
Status: DISCONTINUED | OUTPATIENT
Start: 2023-09-03 | End: 2023-09-04 | Stop reason: HOSPADM

## 2023-09-03 RX ADMIN — AMIODARONE HYDROCHLORIDE 200 MG: 200 TABLET ORAL at 16:04

## 2023-09-03 RX ADMIN — AMIODARONE HYDROCHLORIDE 0.5 MG/MIN: 1.8 INJECTION, SOLUTION INTRAVENOUS at 07:48

## 2023-09-03 RX ADMIN — SERTRALINE HYDROCHLORIDE 100 MG: 100 TABLET ORAL at 08:08

## 2023-09-03 RX ADMIN — FUROSEMIDE 40 MG: 10 INJECTION, SOLUTION INTRAMUSCULAR; INTRAVENOUS at 08:08

## 2023-09-03 RX ADMIN — METOPROLOL TARTRATE 50 MG: 50 TABLET ORAL at 17:10

## 2023-09-03 RX ADMIN — AMIODARONE HYDROCHLORIDE 200 MG: 200 TABLET ORAL at 20:48

## 2023-09-03 RX ADMIN — AMIODARONE HYDROCHLORIDE 200 MG: 200 TABLET ORAL at 11:29

## 2023-09-03 RX ADMIN — ATORVASTATIN CALCIUM 10 MG: 10 TABLET, FILM COATED ORAL at 08:08

## 2023-09-03 RX ADMIN — RIVAROXABAN 20 MG: 20 TABLET, FILM COATED ORAL at 17:09

## 2023-09-03 RX ADMIN — PANTOPRAZOLE SODIUM 40 MG: 40 TABLET, DELAYED RELEASE ORAL at 05:50

## 2023-09-03 RX ADMIN — METOPROLOL TARTRATE 25 MG: 25 TABLET, FILM COATED ORAL at 05:50

## 2023-09-03 NOTE — PLAN OF CARE
Goal Outcome Evaluation:  Plan of Care Reviewed With: patient        Progress: improving  Outcome Evaluation: Pt in Afib, Amiodrarone gtt switched to PO per provider. Sats 96% on Rm air. No complaints of SOA or CP. Ambulated in hallway 250ft. Continue POC.

## 2023-09-03 NOTE — NURSING NOTE
RN transported patient to telemetry. Dentures in patient's mouth at time of transfer. All Patient's belongings with patient and verified with patient. Patient has cell phone in hand. Patient states he will notify his wife of transfer.

## 2023-09-03 NOTE — PROGRESS NOTES
[facility] Medicine Services  PROGRESS NOTE    Patient Name: David Jean Baptiste  : 1960  MRN: [id number]    Date of Admission: 2023  Primary Care Physician: Deniz Chang MD    Subjective   Subjective     CC:  Follow-up for A-fib    HPI:  I have seen and evaluated the patient this morning.  Feeling much better.  Shortness of breath much improved.  Started on amiodarone drip yesterday.  Heart rate is 110 and still A-fib.  No chest pain.    ROS:  General : no fevers, chills  CVS: No chest pain, palpitations  Respiratory: No cough, dyspnea  GI: No N/V/D, abd pain  10 point review of systems is negative except for what is mentioned in HPI    Objective   Objective     Vital Signs:   Temp:  [97.1 °F (36.2 °C)-98.2 °F (36.8 °C)] 97.1 °F (36.2 °C)  Heart Rate:  [] 121  Resp:  [16-18] 18  BP: ()/() 139/116  Flow (L/min):  [1-4] 1     Physical Exam:  General: Comfortable, not in distress, conversant and cooperative  Head: Atraumatic and normocephalic  Eyes: No Icterus. No pallor  Ears:  Ears appear intact with no abnormalities noted  Throat: No oral lesions, no thrush  Neck: Supple, trachea midline  Lungs: Clear to auscultation bilaterally, equal air entry, no wheezing or crackles  Heart:  Normal S1 and S2, no murmur, no gallop, No JVD, no lower extremity swelling  Abdomen:  Soft, no tenderness, no organomegaly, normal bowel sounds, no organomegaly  Extremities: pulses equal bilaterally  Skin: No bleeding, bruising or rash, normal skin turgor and elasticity  Neurologic: Cranial nerves appear intact with no evidence of facial asymmetry, normal motor and sensory functions in all 4 extremities  Psych: Alert and oriented x 3, normal mood    Results Reviewed:  LAB RESULTS:      Lab 23  0503 23  0557 23  1532   WBC 5.60 5.15 5.16   HEMOGLOBIN 14.2 13.7 14.1   HEMATOCRIT 44.7 42.8 44.9   PLATELETS 208 200 203   NEUTROS ABS  --  2.94 2.73   IMMATURE  GRANS (ABS)  --  0.01 0.01   LYMPHS ABS  --  1.64 1.84   MONOS ABS  --  0.39 0.43   EOS ABS  --  0.13 0.11   MCV 92.2 92.6 93.5   PROTIME  --   --  15.5*   APTT  --   --  30.3*   HEPARIN ANTI-XA  --   --  0.10*         Lab 09/03/23  0503 09/02/23  0557 09/01/23  1532   SODIUM 143 144 143   POTASSIUM 3.8 3.8 4.3   CHLORIDE 105 105 107   CO2 27.0 28.0 23.0   ANION GAP 11.0 11.0 13.0   BUN 18 17 17   CREATININE 0.98 0.85 0.89   EGFR 87.2 98.2 96.9   GLUCOSE 91 106* 107*   CALCIUM 8.8 8.7 8.9   MAGNESIUM 2.1 2.0 2.0   PHOSPHORUS 4.6* 3.3  --    HEMOGLOBIN A1C  --   --  5.80*   TSH  --   --  1.650         Lab 09/02/23  0557 09/01/23  1532   TOTAL PROTEIN 6.3 6.8   ALBUMIN 3.9 4.1   GLOBULIN 2.4 2.7   ALT (SGPT) 41 47*   AST (SGOT) 32 39   BILIRUBIN 0.7 0.8   ALK PHOS 110 116         Lab 09/02/23  0557 09/01/23  1532   PROBNP 667.2 939.0*   HSTROP T  --  12   PROTIME  --  15.5*   INR  --  1.21*                 Brief Urine Lab Results       None            Microbiology Results Abnormal       Procedure Component Value - Date/Time    COVID PRE-OP / PRE-PROCEDURE SCREENING ORDER (NO ISOLATION) - Swab, Nasopharynx [[id number]]  (Normal) Collected: 09/01/23 1532    Lab Status: Final result Specimen: Swab from Nasopharynx Updated: 09/01/23 1632    Narrative:      The following orders were created for panel order COVID PRE-OP / PRE-PROCEDURE SCREENING ORDER (NO ISOLATION) - Swab, Nasopharynx.  Procedure                               Abnormality         Status                     ---------                               -----------         ------                     COVID-19, FLU A/B, RSV P...[[id number]]  Normal              Final result                 Please view results for these tests on the individual orders.    COVID-19, FLU A/B, RSV PCR - Swab, Nasopharynx [[id number]]  (Normal) Collected: 09/01/23 1532    Lab Status: Final result Specimen: Swab from Nasopharynx Updated: 09/01/23 1632     COVID19 Not Detected     Influenza A  PCR Not Detected     Influenza B PCR Not Detected     RSV, PCR Not Detected    Narrative:      Fact sheet for providers: https://www.fda.gov/media/068032/download    Fact sheet for patients: https://www.fda.gov/media/279732/download    Test performed by PCR.            Adult Transesophageal Echo (ARAINE) W/ Cont if Necessary Per Protocol    Result Date: 9/2/2023    Left ventricular systolic function is mildly decreased. Left ventricular ejection fraction appears to be 41 - 45%.   No evidence of a left atrial appendage thrombus was present.   Trace to mild aortic valve regurgitation is present.   Mild mitral valve regurgitation is present.     XR Chest 2 View    Result Date: 9/1/2023  XR CHEST 2 VW Date of Exam: 9/1/2023 1:25 PM EDT Indication: cough Comparison: None available. Findings: Cardiomegaly. Prominent central pulmonary vessels. Small bilateral pleural effusions. Strandy atelectasis in the lung bases. No asymmetric infiltrate or edema     Impression: Impression: Cardiomegaly with pulmonary vascular congestion and small pleural effusions. Atelectasis present in the lung bases. No suspicious infiltrate or gross pulmonary edema Electronically Signed: Rojelio Huerta  9/1/2023 1:35 PM EDT  Workstation ID: [id number]    XR Chest 1 View    Result Date: 9/2/2023  XR CHEST 1 VW Date of Exam: 9/2/2023 3:45 AM EDT Indication: A Fib, heart failure Comparison: 9/1/2023 and prior Findings: Study is limited by overlying support and monitoring apparatus. Heart size is enlarged. This is more prominent than the comparison likely related to lower lung volumes. Pulm vascularity demonstrates mild pulmonary vascular congestion. Increased hazy and interstitial opacities noted dependently are accentuated by lower lung volumes. There is mild blunting at the right costophrenic angle and minimal focal dependent opacity. Valuation of the retrocardiac aspect of the left base is limited. Osseous structures are stable.     Impression:  Impression: 1. Cardiomegaly and mild pulmonary vascular congestion with vascular crowding at the lung bases. Findings are accentuated by low lung volumes. 2. Dependent opacities which represent atelectasis and/or mild degree of pulmonary edema and small bilateral pleural effusions noted. 2. Findings appear more prominent in the comparison although this is likely in part due to differences in technique Electronically Signed: Jaswant Cisneros MD  9/2/2023 8:51 AM EDT  Workstation ID: [id number]     Results for orders placed during the hospital encounter of 09/01/23    Adult Transesophageal Echo (ARIANE) W/ Cont if Necessary Per Protocol    Interpretation Summary    Left ventricular systolic function is mildly decreased. Left ventricular ejection fraction appears to be 41 - 45%.    No evidence of a left atrial appendage thrombus was present.    Trace to mild aortic valve regurgitation is present.    Mild mitral valve regurgitation is present.      Current medications:  Scheduled Meds:amiodarone, 200 mg, Oral, TID  atorvastatin, 10 mg, Oral, Daily  [START ON 9/4/2023] furosemide, 40 mg, Oral, Daily  metoprolol tartrate, 50 mg, Oral, Q12H  pantoprazole, 40 mg, Oral, Q AM  rivaroxaban, 20 mg, Oral, Daily With Dinner  sertraline, 100 mg, Oral, Daily      Continuous Infusions:amiodarone, 0.5 mg/min, Last Rate: 0.5 mg/min (09/03/23 0748)      PRN Meds:.  ondansetron    Potassium Replacement - Follow Nurse / BPA Driven Protocol    sodium chloride    Assessment & Plan   Assessment & Plan     Active Hospital Problems    Diagnosis  POA    **Atrial fibrillation [I48.91]  Yes    Paroxysmal atrial fibrillation [I48.0]  Yes      Resolved Hospital Problems   No resolved problems to display.        Brief Hospital Course to date:  David Jean Baptiste is a 62 y.o. male with past medical history of essential hypertension, chronic pain, paroxysmal A-fib who presented to the hospital with A-fib RVR     Assessment and plan:  A-fib with  RVR  Underwent ARIANE and cardioversion  09/02/2023 but quickly flipped into A-fib  Currently on amiodarone drip  Continue Xarelto for anticoagulation  Cardiology team following    Systolic heart failure, compensated  Essential hypertension  Ejection fraction is 40 to 45% per  ARIANE  Likely secondary to A-fib/arrhythmia  Improved with diuresis  Continue metoprolol          Expected Discharge Location and Transportation: Home  Expected Discharge   Expected discharge date/ time has not been documented.     DVT prophylaxis:  Medical and mechanical DVT prophylaxis orders are present.          CODE STATUS:   Code Status and Medical Interventions:   Ordered at: 09/01/23 1851     Code Status (Patient has no pulse and is not breathing):    CPR (Attempt to Resuscitate)     Medical Interventions (Patient has pulse or is breathing):    Full Support       Nicolas Perez MD  09/03/23

## 2023-09-03 NOTE — PROGRESS NOTES
[facility] at [facility]  Progress Note       LOS: 2 days   Patient Care Team:  Deniz Chang MD as PCP - General (Family Medicine)    Chief Complaint:  atrial fibrillation    Subjective     Feeling better this AM. Has not ambulated yet. HRs low 100s. On amio drip.   BP in 120s systolic. No CP or SOB.           Review of Systems:   Pertinent positives in HPI, all others reviewed and negative.      Objective       Current Facility-Administered Medications:     amiodarone (PACERONE) tablet 200 mg, 200 mg, Oral, TID, Sunshine Hadley PA    [COMPLETED] amiodarone 150 mg in 100 mL D5W (loading dose), 150 mg, Intravenous, Once, 150 mg at 23 1249 **FOLLOWED BY** [] amiodarone 360 mg in 200 mL D5W infusion, 1 mg/min, Intravenous, Continuous, Stopped at 23 1848 **FOLLOWED BY** amiodarone 360 mg in 200 mL D5W infusion, 0.5 mg/min, Intravenous, Continuous, Roque Husain MD, Last Rate: 16.67 mL/hr at 23 0748, 0.5 mg/min at 23 0748    atorvastatin (LIPITOR) tablet 10 mg, 10 mg, Oral, Daily, Roque Husain MD, 10 mg at 23 0808    [START ON 2023] furosemide (LASIX) tablet 40 mg, 40 mg, Oral, Daily, Sunshine Hadley PA    metoprolol tartrate (LOPRESSOR) tablet 50 mg, 50 mg, Oral, Q12H, Sunshine Hadley PA    ondansetron (ZOFRAN) injection 4 mg, 4 mg, Intravenous, Q6H PRN, Fco Moore MD    pantoprazole (PROTONIX) EC tablet 40 mg, 40 mg, Oral, Q AM, Roque Husain MD, 40 mg at 23 0550    Potassium Replacement - Follow Nurse / BPA Driven Protocol, , Does not apply, PRN, Fco Moore MD    rivaroxaban (XARELTO) tablet 20 mg, 20 mg, Oral, Daily With Dinner, Roque Husain MD, 20 mg at 23 1826    sertraline (ZOLOFT) tablet 100 mg, 100 mg, Oral, Daily, Roque Husain MD, 100 mg at 23 0808    sodium chloride 0.9 % flush 10 mL, 10 mL, Intravenous, PRN, Emergency, Triage Protocol, MD    Vital Sign  "Min/Max for last 24 hours  Temp  Min: 97.1 °F (36.2 °C)  Max: 98.2 °F (36.8 °C)   BP  Min: 88/64  Max: 139/116   Pulse  Min: 72  Max: 121   Resp  Min: 16  Max: 18   SpO2  Min: 83 %  Max: 99 %   Flow (L/min)  Min: 1  Max: 6   Weight  Min: 132 kg (291 lb 0.1 oz)  Max: 132 kg (291 lb 0.1 oz)     Flowsheet Rows      Flowsheet Row First Filed Value   Admission Height 193 cm (76\") Documented at 09/01/2023 1458   Admission Weight 132 kg (290 lb) Documented at 09/01/2023 1458              Intake/Output Summary (Last 24 hours) at 9/3/2023 1048  Last data filed at 9/3/2023 0930  Gross per 24 hour   Intake 1151.85 ml   Output 1925 ml   Net -773.15 ml       Physical Exam:     General Appearance:    Alert, cooperative, in no acute distress   Lungs:     Clear to auscultation bilaterally,respirations regular, even and unlabored    Heart:    Irr, irr, tachycardic,  normal S1 and S2,   no        murmur, no gallop, no rub, no click   Chest Wall:    No abnormalities observed       Extremities:  Moves all extremities well, no edema, no cyanosis, no            redness              Pulses:   Pulses palpable and equal bilaterally   Skin:   No bleeding, bruising or rash        Results Review:   Results from last 7 days   Lab Units 09/03/23  0503 09/02/23  0557 09/01/23  1532   WBC 10*3/mm3 5.60 5.15 5.16   HEMOGLOBIN g/dL 14.2 13.7 14.1   HEMATOCRIT % 44.7 42.8 44.9   PLATELETS 10*3/mm3 208 200 203     Results from last 7 days   Lab Units 09/03/23  0503 09/02/23  0557 09/01/23  1532   SODIUM mmol/L 143 144 143   POTASSIUM mmol/L 3.8 3.8 4.3   CHLORIDE mmol/L 105 105 107   CO2 mmol/L 27.0 28.0 23.0   BUN mg/dL 18 17 17   CREATININE mg/dL 0.98 0.85 0.89   GLUCOSE mg/dL 91 106* 107*      Results from last 7 days   Lab Units 09/01/23  1532   HEMOGLOBIN A1C % 5.80*         Results from last 7 days   Lab Units 09/01/23  1532   TSH uIU/mL 1.650     Results from last 7 days   Lab Units 09/02/23  0557   PROBNP pg/mL 667.2     Results from last 7 " days   Lab Units 09/01/23  1532   PROTIME Seconds 15.5*   INR  1.21*   APTT seconds 30.3*     Results from last 7 days   Lab Units 09/01/23  1532   HSTROP T ng/L 12       Intake/Output Summary (Last 24 hours) at 9/3/2023 1048  Last data filed at 9/3/2023 0930  Gross per 24 hour   Intake 1151.85 ml   Output 1925 ml   Net -773.15 ml       I personally viewed and interpreted the patient's EKG/Telemetry data    EKG: none for review today    Telemetry: atrial fibrillation  bpm     Ejection Fraction  No results found for: EF    Echo EF Estimated  No results found for: ECHOEFEST      Present on Admission:   Paroxysmal atrial fibrillation   Atrial fibrillation    Assessment & Plan   Persistent Atrial fibrillation  Attempted cardioversion 9/2/2023 but with early return of A-fib  Started on IV Amiodarone protocol.  Continue metoprolol as blood pressure allows with hold parameters, will increase to 50 mg BID for better rate control   Continue Xarelto for anticoagulation, [id number] 2  Will transition to oral Amiodarone 200 mg TID x 10 days, then 200 mg daily with plan for outpatient ECV in 2-3 weeks with Dr. Husain.   EF appears to be 41 to 45% ARIANE  Acute heart failure with mildly reduced ejection fraction  Secondary to A-fib with RVR  Having good output with IV Lasix continue 40 mg IV daily. Transition to oral tomorrow.   EF 41-45%.   Metoprolol tartrate for now due to need for rate control. No ACE/Entresto for now due to marginal BPs. Consider adding as outpatient.   Postprocedural hypotension  Suspect hypotension related to sedation post procedur  Did give back 500 cc of IV saline  Resolved now.     Plan for disposition: Increase Metoprolol today, transition to PO Amiodarone and PO lasix as above. Assess in AM, anticipate d/c home tomorrow.     Electronically signed by BRODIE Mckinnon, 09/03/23, 9:44 AM EDT.

## 2023-09-03 NOTE — PLAN OF CARE
Goal Outcome Evaluation:              Outcome Evaluation: Pt still in Afib. Amiodarone gtt continued. VSS. 2L NC. No complaints on shift. Pt denies CP or SOA. Will continue plan of care.

## 2023-09-04 ENCOUNTER — READMISSION MANAGEMENT (OUTPATIENT)
Dept: CALL CENTER | Facility: HOSPITAL | Age: 63
End: 2023-09-04
Payer: COMMERCIAL

## 2023-09-04 VITALS
HEART RATE: 92 BPM | TEMPERATURE: 97.2 F | HEIGHT: 76 IN | OXYGEN SATURATION: 95 % | WEIGHT: 291.01 LBS | DIASTOLIC BLOOD PRESSURE: 99 MMHG | BODY MASS INDEX: 35.44 KG/M2 | RESPIRATION RATE: 18 BRPM | SYSTOLIC BLOOD PRESSURE: 119 MMHG

## 2023-09-04 PROBLEM — I50.22 CHRONIC SYSTOLIC HEART FAILURE: Status: ACTIVE | Noted: 2023-09-04

## 2023-09-04 LAB
ALBUMIN SERPL-MCNC: 3.8 G/DL (ref 3.5–5.2)
ALBUMIN/GLOB SERPL: 1.7 G/DL
ALP SERPL-CCNC: 113 U/L (ref 39–117)
ALT SERPL W P-5'-P-CCNC: 31 U/L (ref 1–41)
ANION GAP SERPL CALCULATED.3IONS-SCNC: 10 MMOL/L (ref 5–15)
AST SERPL-CCNC: 23 U/L (ref 1–40)
BASOPHILS # BLD AUTO: 0.05 10*3/MM3 (ref 0–0.2)
BASOPHILS NFR BLD AUTO: 0.9 % (ref 0–1.5)
BILIRUB SERPL-MCNC: 0.8 MG/DL (ref 0–1.2)
BUN SERPL-MCNC: 19 MG/DL (ref 8–23)
BUN/CREAT SERPL: 20.4 (ref 7–25)
CALCIUM SPEC-SCNC: 9 MG/DL (ref 8.6–10.5)
CHLORIDE SERPL-SCNC: 104 MMOL/L (ref 98–107)
CO2 SERPL-SCNC: 29 MMOL/L (ref 22–29)
CREAT SERPL-MCNC: 0.93 MG/DL (ref 0.76–1.27)
DEPRECATED RDW RBC AUTO: 44.2 FL (ref 37–54)
EGFRCR SERPLBLD CKD-EPI 2021: 92.8 ML/MIN/1.73
EOSINOPHIL # BLD AUTO: 0.33 10*3/MM3 (ref 0–0.4)
EOSINOPHIL NFR BLD AUTO: 6 % (ref 0.3–6.2)
ERYTHROCYTE [DISTWIDTH] IN BLOOD BY AUTOMATED COUNT: 13.1 % (ref 12.3–15.4)
GLOBULIN UR ELPH-MCNC: 2.2 GM/DL
GLUCOSE SERPL-MCNC: 101 MG/DL (ref 65–99)
HCT VFR BLD AUTO: 45.3 % (ref 37.5–51)
HGB BLD-MCNC: 14.6 G/DL (ref 13–17.7)
IMM GRANULOCYTES # BLD AUTO: 0.02 10*3/MM3 (ref 0–0.05)
IMM GRANULOCYTES NFR BLD AUTO: 0.4 % (ref 0–0.5)
LYMPHOCYTES # BLD AUTO: 1.65 10*3/MM3 (ref 0.7–3.1)
LYMPHOCYTES NFR BLD AUTO: 29.8 % (ref 19.6–45.3)
MAGNESIUM SERPL-MCNC: 2.1 MG/DL (ref 1.6–2.4)
MCH RBC QN AUTO: 29.4 PG (ref 26.6–33)
MCHC RBC AUTO-ENTMCNC: 32.2 G/DL (ref 31.5–35.7)
MCV RBC AUTO: 91.3 FL (ref 79–97)
MONOCYTES # BLD AUTO: 0.47 10*3/MM3 (ref 0.1–0.9)
MONOCYTES NFR BLD AUTO: 8.5 % (ref 5–12)
NEUTROPHILS NFR BLD AUTO: 3.01 10*3/MM3 (ref 1.7–7)
NEUTROPHILS NFR BLD AUTO: 54.4 % (ref 42.7–76)
NRBC BLD AUTO-RTO: 0 /100 WBC (ref 0–0.2)
PHOSPHATE SERPL-MCNC: 4.4 MG/DL (ref 2.5–4.5)
PLATELET # BLD AUTO: 210 10*3/MM3 (ref 140–450)
PMV BLD AUTO: 11.2 FL (ref 6–12)
POTASSIUM SERPL-SCNC: 4.1 MMOL/L (ref 3.5–5.2)
PROT SERPL-MCNC: 6 G/DL (ref 6–8.5)
QT INTERVAL: 364 MS
QTC INTERVAL: 487 MS
RBC # BLD AUTO: 4.96 10*6/MM3 (ref 4.14–5.8)
SODIUM SERPL-SCNC: 143 MMOL/L (ref 136–145)
WBC NRBC COR # BLD: 5.53 10*3/MM3 (ref 3.4–10.8)

## 2023-09-04 PROCEDURE — 93005 ELECTROCARDIOGRAM TRACING: CPT | Performed by: INTERNAL MEDICINE

## 2023-09-04 PROCEDURE — 99232 SBSQ HOSP IP/OBS MODERATE 35: CPT | Performed by: PHYSICIAN ASSISTANT

## 2023-09-04 PROCEDURE — 84100 ASSAY OF PHOSPHORUS: CPT | Performed by: INTERNAL MEDICINE

## 2023-09-04 PROCEDURE — 85025 COMPLETE CBC W/AUTO DIFF WBC: CPT | Performed by: INTERNAL MEDICINE

## 2023-09-04 PROCEDURE — 80053 COMPREHEN METABOLIC PANEL: CPT | Performed by: INTERNAL MEDICINE

## 2023-09-04 PROCEDURE — 83735 ASSAY OF MAGNESIUM: CPT | Performed by: INTERNAL MEDICINE

## 2023-09-04 RX ORDER — AMIODARONE HYDROCHLORIDE 200 MG/1
200 TABLET ORAL 3 TIMES DAILY
Qty: 60 TABLET | Refills: 6 | Status: SHIPPED | OUTPATIENT
Start: 2023-09-04

## 2023-09-04 RX ORDER — METOPROLOL TARTRATE 50 MG/1
50 TABLET, FILM COATED ORAL EVERY 12 HOURS SCHEDULED
Qty: 60 TABLET | Refills: 6 | Status: SHIPPED | OUTPATIENT
Start: 2023-09-04

## 2023-09-04 RX ORDER — POTASSIUM CHLORIDE 1500 MG/1
20 TABLET, EXTENDED RELEASE ORAL DAILY
Qty: 30 TABLET | Refills: 6 | Status: SHIPPED | OUTPATIENT
Start: 2023-09-04

## 2023-09-04 RX ORDER — FUROSEMIDE 40 MG/1
40 TABLET ORAL DAILY
Qty: 30 TABLET | Refills: 6 | Status: SHIPPED | OUTPATIENT
Start: 2023-09-04

## 2023-09-04 RX ADMIN — ATORVASTATIN CALCIUM 10 MG: 10 TABLET, FILM COATED ORAL at 08:37

## 2023-09-04 RX ADMIN — METOPROLOL TARTRATE 50 MG: 50 TABLET ORAL at 05:22

## 2023-09-04 RX ADMIN — SERTRALINE HYDROCHLORIDE 100 MG: 100 TABLET ORAL at 08:37

## 2023-09-04 RX ADMIN — AMIODARONE HYDROCHLORIDE 200 MG: 200 TABLET ORAL at 08:37

## 2023-09-04 RX ADMIN — FUROSEMIDE 40 MG: 40 TABLET ORAL at 08:37

## 2023-09-04 RX ADMIN — PANTOPRAZOLE SODIUM 40 MG: 40 TABLET, DELAYED RELEASE ORAL at 05:22

## 2023-09-04 NOTE — DISCHARGE SUMMARY
[facility] Medicine Services  DISCHARGE SUMMARY    Patient Name: David Jean Baptiste  : 1960  MRN: [id number]    Date of Admission: 2023  3:06 PM  Date of Discharge:  2023  Primary Care Physician: Deniz Chang MD    Consults       No orders found from 8/3/2023 to 2023.            Hospital Course       Active Hospital Problems    Diagnosis  POA    **Atrial fibrillation [I48.91]  Yes    Chronic systolic heart failure [I50.22]  Unknown    Paroxysmal atrial fibrillation [I48.0]  Yes      Resolved Hospital Problems   No resolved problems to display.        Hospital Course:  David Jean Baptiste is a 62 y.o. male with past medical history of essential hypertension, chronic pain, paroxysmal A-fib who presented to the hospital with A-fib RVR. He underwent ARIANE and cardioversion  2023 but quickly flipped into A-fib, cardiology was consulted, he is s/p Amio gtt., day recommend to continue metoprolol 50 mg twice daily, amiodarone  200 mg 3 times daily x10 days, then 200 mg daily with plan for outpatient ECV in 2 to 3 weeks with Dr. Husain, continue anticoagulation with Xarelto.  In regards to his systolic heart failure, he is currently compensated, echo did show EF of 41-45%, continue Lasix 40 mg daily with KCl 20 meq daily    Discharge Follow Up Recommendations for outpatient labs/diagnostics:  Follow-up visit in 1 week  Follow-up with cardiology as scheduled    Day of Discharge     HPI: No acute events overnight, patient said that he did not get much rest but feels okay    Review of Systems  Gen- No fevers, chills  CV- No chest pain, palpitations  Resp- No cough, dyspnea  GI- No N/V/D, abd pain     Vital Signs:   Temp:  [97.1 °F (36.2 °C)-98.7 °F (37.1 °C)] 97.2 °F (36.2 °C)  Heart Rate:  [] 92  Resp:  [16-20] 18  BP: (104-139)/() 119/99  Flow (L/min):  [1] 1      Physical Exam:  Constitutional: No acute distress, awake, alert  HENT: NCAT, mucous  membranes moist  Respiratory: Clear to auscultation bilaterally, respiratory effort normal   Cardiovascular: Irregularly irregular, no murmurs, rubs, or gallops  Gastrointestinal: Positive bowel sounds, soft, nontender, nondistended  Musculoskeletal: No bilateral ankle edema  Psychiatric: Appropriate affect, cooperative  Neurologic: Oriented x 3, nonfocal  Skin: No rashes     Pertinent  and/or Most Recent Results     LAB RESULTS:      Lab 09/04/23  0648 09/03/23  0503 09/02/23  0557 09/01/23  1532   WBC 5.53 5.60 5.15 5.16   HEMOGLOBIN 14.6 14.2 13.7 14.1   HEMATOCRIT 45.3 44.7 42.8 44.9   PLATELETS 210 208 200 203   NEUTROS ABS 3.01  --  2.94 2.73   IMMATURE GRANS (ABS) 0.02  --  0.01 0.01   LYMPHS ABS 1.65  --  1.64 1.84   MONOS ABS 0.47  --  0.39 0.43   EOS ABS 0.33  --  0.13 0.11   MCV 91.3 92.2 92.6 93.5   PROTIME  --   --   --  15.5*   APTT  --   --   --  30.3*   HEPARIN ANTI-XA  --   --   --  0.10*         Lab 09/04/23  0648 09/03/23  0503 09/02/23  0557 09/01/23  1532   SODIUM 143 143 144 143   POTASSIUM 4.1 3.8 3.8 4.3   CHLORIDE 104 105 105 107   CO2 29.0 27.0 28.0 23.0   ANION GAP 10.0 11.0 11.0 13.0   BUN 19 18 17 17   CREATININE 0.93 0.98 0.85 0.89   EGFR 92.8 87.2 98.2 96.9   GLUCOSE 101* 91 106* 107*   CALCIUM 9.0 8.8 8.7 8.9   MAGNESIUM 2.1 2.1 2.0 2.0   PHOSPHORUS 4.4 4.6* 3.3  --    HEMOGLOBIN A1C  --   --   --  5.80*   TSH  --   --   --  1.650         Lab 09/04/23  0648 09/02/23  0557 09/01/23  1532   TOTAL PROTEIN 6.0 6.3 6.8   ALBUMIN 3.8 3.9 4.1   GLOBULIN 2.2 2.4 2.7   ALT (SGPT) 31 41 47*   AST (SGOT) 23 32 39   BILIRUBIN 0.8 0.7 0.8   ALK PHOS 113 110 116         Lab 09/02/23  0557 09/01/23  1532   PROBNP 667.2 939.0*   HSTROP T  --  12   PROTIME  --  15.5*   INR  --  1.21*                 Brief Urine Lab Results       None          Microbiology Results (last 10 days)       Procedure Component Value - Date/Time    COVID PRE-OP / PRE-PROCEDURE SCREENING ORDER (NO ISOLATION) - Swab,  Nasopharynx [[id number]]  (Normal) Collected: 09/01/23 1532    Lab Status: Final result Specimen: Swab from Nasopharynx Updated: 09/01/23 1632    Narrative:      The following orders were created for panel order COVID PRE-OP / PRE-PROCEDURE SCREENING ORDER (NO ISOLATION) - Swab, Nasopharynx.  Procedure                               Abnormality         Status                     ---------                               -----------         ------                     COVID-19, FLU A/B, RSV P...[[id number]]  Normal              Final result                 Please view results for these tests on the individual orders.    COVID-19, FLU A/B, RSV PCR - Swab, Nasopharynx [[id number]]  (Normal) Collected: 09/01/23 1532    Lab Status: Final result Specimen: Swab from Nasopharynx Updated: 09/01/23 1632     COVID19 Not Detected     Influenza A PCR Not Detected     Influenza B PCR Not Detected     RSV, PCR Not Detected    Narrative:      Fact sheet for providers: https://www.fda.gov/media/180783/download    Fact sheet for patients: https://www.fda.gov/media/674020/download    Test performed by PCR.            Adult Transesophageal Echo (ARIANE) W/ Cont if Necessary Per Protocol    Result Date: 9/2/2023    Left ventricular systolic function is mildly decreased. Left ventricular ejection fraction appears to be 41 - 45%.   No evidence of a left atrial appendage thrombus was present.   Trace to mild aortic valve regurgitation is present.   Mild mitral valve regurgitation is present.     XR Chest 2 View    Result Date: 9/1/2023  XR CHEST 2 VW Date of Exam: 9/1/2023 1:25 PM EDT Indication: cough Comparison: None available. Findings: Cardiomegaly. Prominent central pulmonary vessels. Small bilateral pleural effusions. Strandy atelectasis in the lung bases. No asymmetric infiltrate or edema     Impression: Cardiomegaly with pulmonary vascular congestion and small pleural effusions. Atelectasis present in the lung bases. No suspicious infiltrate  or gross pulmonary edema Electronically Signed: Rojelio Huerta  9/1/2023 1:35 PM EDT  Workstation ID: [id number]    XR Chest 1 View    Result Date: 9/2/2023  XR CHEST 1 VW Date of Exam: 9/2/2023 3:45 AM EDT Indication: A Fib, heart failure Comparison: 9/1/2023 and prior Findings: Study is limited by overlying support and monitoring apparatus. Heart size is enlarged. This is more prominent than the comparison likely related to lower lung volumes. Pulm vascularity demonstrates mild pulmonary vascular congestion. Increased hazy and interstitial opacities noted dependently are accentuated by lower lung volumes. There is mild blunting at the right costophrenic angle and minimal focal dependent opacity. Valuation of the retrocardiac aspect of the left base is limited. Osseous structures are stable.     Impression: 1. Cardiomegaly and mild pulmonary vascular congestion with vascular crowding at the lung bases. Findings are accentuated by low lung volumes. 2. Dependent opacities which represent atelectasis and/or mild degree of pulmonary edema and small bilateral pleural effusions noted. 2. Findings appear more prominent in the comparison although this is likely in part due to differences in technique Electronically Signed: Jaswant Cisneros MD  9/2/2023 8:51 AM EDT  Workstation ID: [id number]             Results for orders placed during the hospital encounter of 09/01/23    Adult Transesophageal Echo (ARIANE) W/ Cont if Necessary Per Protocol    Interpretation Summary    Left ventricular systolic function is mildly decreased. Left ventricular ejection fraction appears to be 41 - 45%.    No evidence of a left atrial appendage thrombus was present.    Trace to mild aortic valve regurgitation is present.    Mild mitral valve regurgitation is present.      Plan for Follow-up of Pending Labs/Results:       Discharge Details        Discharge Medications        New Medications        Instructions Start Date   amiodarone 200 MG  tablet  Commonly known as: PACERONE   200 mg, Oral, 3 Times Daily, Three times daily for 10 days, then 200 mg once daily thereafter      furosemide 40 MG tablet  Commonly known as: LASIX   40 mg, Oral, Daily      metoprolol tartrate 50 MG tablet  Commonly known as: LOPRESSOR   50 mg, Oral, Every 12 Hours Scheduled      potassium chloride ER 20 MEQ tablet controlled-release ER tablet  Commonly known as: K-TAB   20 mEq, Oral, Daily      rivaroxaban 20 MG tablet  Commonly known as: XARELTO   20 mg, Oral, Daily With Dinner             Continue These Medications        Instructions Start Date   diclofenac 75 MG EC tablet  Commonly known as: VOLTAREN   75 mg, Oral, 2 Times Daily      lovastatin 40 MG 24 hr tablet  Commonly known as: ALTOPREV   40 mg, Oral, Nightly      multivitamin tablet tablet   1 tablet, Oral, Daily      omeprazole 20 MG capsule  Commonly known as: priLOSEC   20 mg, Oral, Daily      sertraline 100 MG tablet  Commonly known as: ZOLOFT   100 mg, Oral, Daily      Turmeric 500 MG capsule   1 capsule, Oral, Daily      vitamin B-12 500 MCG tablet  Commonly known as: CYANOCOBALAMIN   500 mcg, Oral, Daily, OTC             Stop These Medications      aspirin 81 MG EC tablet              No Known Allergies      Discharge Disposition:home      Diet:  Hospital:  Diet Order   Procedures    Diet: Cardiac Diets; Healthy Heart (2-3 Na+); Texture: Regular Texture (IDDSI 7); Fluid Consistency: Thin (IDDSI 0)       Activity:as tolerated    Restrictions or Other Recommendations:  none       CODE STATUS:    Code Status and Medical Interventions:   Ordered at: 09/01/23 0657     Code Status (Patient has no pulse and is not breathing):    CPR (Attempt to Resuscitate)     Medical Interventions (Patient has pulse or is breathing):    Full Support     No future appointments.    Additional Instructions for the Follow-ups that You Need to Schedule       Discharge Follow-up with Specialty: Dr. Husain; 2 Months   As directed       Specialty: Dr. Husain   Follow Up: 2 Months   Follow Up Details: admission for BRYNN Beltran MD  09/04/23    Time Spent on Discharge:  I spent  35  minutes on this discharge activity which included: face-to-face encounter with the patient, reviewing the data in the system, coordination of the care with the nursing staff as well as consultants, documentation, and entering orders.

## 2023-09-04 NOTE — CASE MANAGEMENT/SOCIAL WORK
Case Management Discharge Note      Final Note: D/C to home and no needs. On RA and family transported         Selected Continued Care - Discharged on 9/4/2023 Admission date: 9/1/2023 - Discharge disposition: Home or Self Care      Destination    No services have been selected for the patient.                Durable Medical Equipment    No services have been selected for the patient.                Dialysis/Infusion    No services have been selected for the patient.                Home Medical Care    No services have been selected for the patient.                Therapy    No services have been selected for the patient.                Community Resources    No services have been selected for the patient.                Community & DME    No services have been selected for the patient.                         Final Discharge Disposition Code: 01 - home or self-care

## 2023-09-04 NOTE — OUTREACH NOTE
Prep Survey      Flowsheet Row Responses   [facility] facility patient discharged from? [facility]   Is LACE score < 7 ? No   Eligibility Readm Mgmt   Discharge diagnosis **Atrial fibrillationChronic systolic heart failure   Does the patient have one of the following disease processes/diagnoses(primary or secondary)? CHF   Does the patient have Home health ordered? No   Is there a DME ordered? No   Prep survey completed? Yes            NIXON GARRETT - Registered Nurse

## 2023-09-04 NOTE — PLAN OF CARE
Goal Outcome Evaluation:  Plan of Care Reviewed With: patient        Progress: no change  Outcome Evaluation: Patient rested without complaints this shift. On room air while awake, placed on 1L NC while sleeping. AFib on tele.

## 2023-09-04 NOTE — PROGRESS NOTES
[facility] at [facility]  Progress Note       LOS: 3 days   Patient Care Team:  Deniz Chang MD as PCP - General (Family Medicine)    Chief Complaint:  atrial fibrillation    Subjective     Patient is doing well this AM. Remains in AFIB, but now rate controlled on higher dose of Metoprolol. Walked in hallway yesterday x 2 on RA with normal O2 sats. On RA now, just 2L at night. No SOB, cough, or CP. Feels ready to go home.         Review of Systems:   Pertinent positives in HPI, all others reviewed and negative.      Objective       Current Facility-Administered Medications:     amiodarone (PACERONE) tablet 200 mg, 200 mg, Oral, TID, Sunshine Haldey PA, 200 mg at 09/03/23 2048    atorvastatin (LIPITOR) tablet 10 mg, 10 mg, Oral, Daily, Roque Husain MD, 10 mg at 09/03/23 0808    furosemide (LASIX) tablet 40 mg, 40 mg, Oral, Daily, Sunshine Hadley PA    metoprolol tartrate (LOPRESSOR) tablet 50 mg, 50 mg, Oral, Q12H, Sunshine Hadley PA, 50 mg at 09/04/23 0522    ondansetron (ZOFRAN) injection 4 mg, 4 mg, Intravenous, Q6H PRN, Fco Moore MD    pantoprazole (PROTONIX) EC tablet 40 mg, 40 mg, Oral, Q AM, Roque Husain MD, 40 mg at 09/04/23 0522    Potassium Replacement - Follow Nurse / BPA Driven Protocol, , Does not apply, PRN, Fco Moore MD    rivaroxaban (XARELTO) tablet 20 mg, 20 mg, Oral, Daily With Dinner, Roque Husain MD, 20 mg at 09/03/23 1709    sertraline (ZOLOFT) tablet 100 mg, 100 mg, Oral, Daily, Roque Husain MD, 100 mg at 09/03/23 0808    sodium chloride 0.9 % flush 10 mL, 10 mL, Intravenous, PRN, Emergency, Triage Protocol, MD    Vital Sign Min/Max for last 24 hours  Temp  Min: 97.1 °F (36.2 °C)  Max: 98.7 °F (37.1 °C)   BP  Min: 104/86  Max: 139/116   Pulse  Min: 56  Max: 122   Resp  Min: 16  Max: 20   SpO2  Min: 93 %  Max: 96 %   Flow (L/min)  Min: 1  Max: 1   No data recorded     Flowsheet Rows      Flowsheet Row First  "Filed Value   Admission Height 193 cm (76\") Documented at 09/01/2023 1458   Admission Weight 132 kg (290 lb) Documented at 09/01/2023 1458              Intake/Output Summary (Last 24 hours) at 9/4/2023 0716  Last data filed at 9/3/2023 2046  Gross per 24 hour   Intake --   Output 2350 ml   Net -2350 ml       Physical Exam:     General Appearance:    Alert, cooperative, in no acute distress   Lungs:     Clear to auscultation bilaterally,respirations regular, even and unlabored    Heart:    Irr, irr, normal rate  normal S1 and S2,   no  murmur, no gallop, no rub, no click   Chest Wall:    No abnormalities observed       Extremities:  Moves all extremities well, no edema, no cyanosis, no            redness              Pulses:   Pulses palpable and equal bilaterally   Skin:   No bleeding, bruising or rash        Results Review:   Results from last 7 days   Lab Units 09/03/23  0503 09/02/23  0557 09/01/23  1532   WBC 10*3/mm3 5.60 5.15 5.16   HEMOGLOBIN g/dL 14.2 13.7 14.1   HEMATOCRIT % 44.7 42.8 44.9   PLATELETS 10*3/mm3 208 200 203     Results from last 7 days   Lab Units 09/03/23  0503 09/02/23  0557 09/01/23  1532   SODIUM mmol/L 143 144 143   POTASSIUM mmol/L 3.8 3.8 4.3   CHLORIDE mmol/L 105 105 107   CO2 mmol/L 27.0 28.0 23.0   BUN mg/dL 18 17 17   CREATININE mg/dL 0.98 0.85 0.89   GLUCOSE mg/dL 91 106* 107*      Results from last 7 days   Lab Units 09/01/23  1532   HEMOGLOBIN A1C % 5.80*         Results from last 7 days   Lab Units 09/01/23  1532   TSH uIU/mL 1.650     Results from last 7 days   Lab Units 09/02/23  0557   PROBNP pg/mL 667.2     Results from last 7 days   Lab Units 09/01/23  1532   PROTIME Seconds 15.5*   INR  1.21*   APTT seconds 30.3*     Results from last 7 days   Lab Units 09/01/23  1532   HSTROP T ng/L 12       Intake/Output Summary (Last 24 hours) at 9/4/2023 0716  Last data filed at 9/3/2023 2046  Gross per 24 hour   Intake --   Output 2350 ml   Net -2350 ml       I personally viewed and " interpreted the patient's EKG/Telemetry data    EKG: atrial fibrillation 108 bpm, QTc stable.     Telemetry: atrial fibrillation  bpm    Ejection Fraction  No results found for: EF    Echo EF Estimated  No results found for: ECHOEFEST      Present on Admission:   Paroxysmal atrial fibrillation   Atrial fibrillation    Assessment & Plan   Persistent Atrial fibrillation  Attempted cardioversion 9/2/2023 but with early return of A-fib  Started on IV Amiodarone protocol.  Continue Metoprolol 50 mg BID for rate control.  Continue Xarelto for anticoagulation, [id number] 2  Now on Amiodarone 200 mg TID x 10 days, then 200 mg daily with plan for outpatient ECV in 2-3 weeks with Dr. Husain.   EF appears to be 41 to 45% ARIANE  Acute heart failure with mildly reduced ejection fraction  Secondary to A-fib with RVR  Clinically improved s/p IV Lasix continue 40 mg IV daily. Now on RA. Transition to oral  Lasix 40 mg daily and would d/c home on Lasix 40 mg with KCL 20 meq daily.   EF 41-45%.   Metoprolol tartrate for now due to need for rate control. No ACE/Entresto for now due to marginal BPs. Consider adding as outpatient.     Postprocedural hypotension  Suspect hypotension related to sedation post procedur  Did give back 500 cc of IV saline  Resolved now.     Plan for disposition: ok home today from cardiology standpoint. Will schedule ECV in 2 weeks after Amiodarone load. Follow up with Dr. Husain in 2 months.     Electronically signed by BRODIE Mckinnon, 09/04/23, 7:16 AM EDT.

## 2023-09-05 LAB
QT INTERVAL: 382 MS
QTC INTERVAL: 472 MS

## 2023-09-05 NOTE — PAYOR COMM NOTE
"[facility]   phone: [phone]  fax: [phone]    Notification of IP     Facility NPI [id number]  ICD 10 I48.91 Afib    David Ayon (62 y.o. Male)       Date of Birth   1960    Social Security Number       Address   [address] [place] [address]  [place] [address]    Home Phone   [phone]    MRN   [id number]       Buddhism   None    Marital Status                               Admission Date   9/1/23    Admission Type   Emergency    Admitting Provider   Ky Beltran MD    Attending Provider       Department, Room/Bed   [facility] 6B, N626/1       Discharge Date   9/4/2023    Discharge Disposition   Home or Self Care    Discharge Destination                                 Attending Provider: (none)   Allergies: No Known Allergies    Isolation: None   Infection: None   Code Status: Prior    Ht: 193 cm (75.98\")   Wt: 132 kg (291 lb 0.1 oz)    Admission Cmt: None   Principal Problem: Atrial fibrillation [I48.91]                   Active Insurance as of 9/1/2023       Primary Coverage       Payor Plan Insurance Group Employer/Plan Group    [email or web address] PPO [id number]       Payor Plan Address Payor Plan Phone Number Payor Plan Fax Number Effective Dates    [address] [phone]  4/1/2016 - None Entered    [place] [address]         Subscriber Name Subscriber Birth Date Member ID       DAVID AYON 1960 [id number]                     Emergency Contacts        (Rel.) Home Phone Work Phone Mobile Phone    Alicia Ayon (Spouse) [phone] -- [phone]                 History & Physical        Roque Husain MD at 09/01/23 1853          [facility]  Consultation History and Physical  David Ayon  1960      PCP:   Deniz Chang MD        Admit Date:  9/1/2023      Chief Complaint:   Chief Complaint   Patient presents with    Shortness of Breath "           History of Present Illness:  David Jean Baptiste  Is a 62 y.o. male with medical history including arthritis, depression, hyperlipidemia.  He reports feeling short of breath for the last week or so.  He states when he would lay down he would feel short of breath and hear a crackling noise in his chest.  He went to an urgent care today where he had a chest x-ray that revealed cardiomegaly with pulmonary vascular congestion and small effusions.  He was referred to the ED for further evaluation.  In the ED he was found to be in atrial fibrillation with a rapid ventricular response initial rates 160s.  He has not felt chest pain or rapid palpitations.  He has not shown signs of lower extremity swelling and some abdominal distention.  He was started on a diltiazem drip in the ED and received IV Lasix.  He has diuresed well but he has not had significant change in his heart rate.  He has been hypertensive so labetalol drip is being initiated as well.  proBNP is elevated, troponin normal.  Bedside echo shows EF low normal 45 to 50% in atrial fibrillation with RVR.  TSH pending.  He is in no acute distress      Patient Active Problem List    Diagnosis Date Noted    *Atrial fibrillation 09/01/2023    Paroxysmal atrial fibrillation 09/01/2023       No Known Allergies    Social History     Socioeconomic History    Marital status:    Tobacco Use    Smoking status: Never     Passive exposure: Never    Smokeless tobacco: Never   Vaping Use    Vaping Use: Never used   Substance and Sexual Activity    Alcohol use: Yes     Comment: rare    Drug use: No    Sexual activity: Defer       History reviewed. No pertinent family history.    Current Medications:    Current Facility-Administered Medications:     [START ON 9/2/2023] digoxin (LANOXIN) injection 250 mcg, 250 mcg, Intravenous, Once, Roque Husain MD    dilTIAZem (CARDIZEM) 125 mg in sodium chloride 0.9 % 125 mL infusion, 5-15 mg/hr, Intravenous,  Titrated, Yang Raygoza MD, Last Rate: 15 mL/hr at 09/01/23 1616, 15 mg/hr at 09/01/23 1616    [START ON 9/2/2023] furosemide (LASIX) injection 40 mg, 40 mg, Intravenous, Daily, Roque Husain MD    labetalol (NORMODYNE,TRANDATE) 300 mg in sodium chloride 0.9 % 300 mL infusion, 0.5-2 mg/min, Intravenous, Titrated, Yang Raygoza MD, Last Rate: 30 mL/hr at 09/01/23 1824, 0.5 mg/min at 09/01/23 1824    metoprolol tartrate (LOPRESSOR) tablet 25 mg, 25 mg, Oral, Q12H, Roque Husain MD, 25 mg at 09/01/23 1823    ondansetron (ZOFRAN) injection 4 mg, 4 mg, Intravenous, Q6H PRN, Fco Moore MD    Potassium Replacement - Follow Nurse / BPA Driven Protocol, , Does not apply, PRN, Fco Moore MD    sodium chloride 0.9 % flush 10 mL, 10 mL, Intravenous, PRN, Emergency, Triage Protocol, MD    Current Outpatient Medications:     aspirin 81 MG EC tablet, Take 1 tablet by mouth Daily., Disp: , Rfl:     diclofenac (VOLTAREN) 75 MG EC tablet, Take 1 tablet by mouth 2 (Two) Times a Day., Disp: 14 tablet, Rfl: 0    lovastatin (ALTOPREV) 40 MG 24 hr tablet, Take 1 tablet by mouth Every Night., Disp: , Rfl:     multivitamin (Multiple Vitamin) tablet tablet, Take 1 tablet by mouth Daily., Disp: , Rfl:     omeprazole (priLOSEC) 20 MG capsule, Take 1 capsule by mouth Daily., Disp: , Rfl:     sertraline (ZOLOFT) 100 MG tablet, Take 1 tablet by mouth Daily., Disp: , Rfl:     Turmeric 500 MG capsule, Take 1 capsule by mouth Daily., Disp: , Rfl:     vitamin B-12 (CYANOCOBALAMIN) 500 MCG tablet, Take 1 tablet by mouth Daily. OTC, Disp: , Rfl:      Review of Systems   Cardiovascular:  Positive for dyspnea on exertion, leg swelling and orthopnea. Negative for chest pain.   Respiratory:  Positive for shortness of breath.      OBJECTIVE:  Vitals:    09/01/23 1730 09/01/23 1750 09/01/23 1820 09/01/23 1830   BP: (!) 129/113 158/98 (!) 132/119 (!) 132/117   BP Location:       Patient Position:       Pulse: (!) 141 (!)  149 (!) 151 (!) 130   Resp:       Temp:       TempSrc:       SpO2: 91% (!) 82%  91%   Weight:       Height:         No intake/output data recorded.  I/O this shift:  In: -   Out: 4200 [Urine:4200]  Intake & Output (last 3 days)         08/29 0701  08/30 0700 08/30 0701  08/31 0700 08/31 0701 09/01 0700 09/01 0701 09/02 0700    Urine (mL/kg/hr)    4200    Total Output    4200    Net    -4200                       Physical Exam  Constitutional:       Appearance: Normal appearance.   Cardiovascular:      Rate and Rhythm: Tachycardia present. Rhythm irregular.      Heart sounds: No murmur heard.  Pulmonary:      Effort: Pulmonary effort is normal.      Breath sounds: Rales (Bibasilar) present.   Abdominal:      Palpations: Abdomen is soft.   Musculoskeletal:      Right lower leg: Edema present.      Left lower leg: Edema present.      Comments: 2+   Neurological:      General: No focal deficit present.      Mental Status: He is alert.       Diagnostic Data:  Lab Results (last 24 hours)       Procedure Component Value Units Date/Time    TSH Rfx On Abnormal To Free T4 [[id number]] Collected: 09/01/23 1532    Specimen: Blood Updated: 09/01/23 1756    Heparin Anti-Xa [[id number]]  (Abnormal) Collected: 09/01/23 1532    Specimen: Blood Updated: 09/01/23 1653     Heparin Anti-Xa (UFH) 0.10 IU/ml     aPTT [[id number]]  (Abnormal) Collected: 09/01/23 1532    Specimen: Blood Updated: 09/01/23 1652     PTT 30.3 seconds     Narrative:      PTT = The equivalent PTT values for the therapeutic range of heparin levels at 0.3 to 0.5 U/ml are 60 to 70 seconds.    Protime-INR [[id number]]  (Abnormal) Collected: 09/01/23 1532    Specimen: Blood Updated: 09/01/23 1652     Protime 15.5 Seconds      INR 1.21    [place] Draw [[id number]] Collected: 09/01/23 1532    Specimen: Blood Updated: 09/01/23 1645    Narrative:      The following orders were created for panel order [place] Draw.  Procedure                               Abnormality          Status                     ---------                               -----------         ------                     Green Top (Gel)[[id number]]                                  Final result               Lavender Top[[id number]]                                     Final result               Gold Top - SST[[id number]]                                   Final result               Cedillo Top[[id number]]                                         In process                 Light Blue Top[[id number]]                                   Final result                 Please view results for these tests on the individual orders.    Green Top (Gel) [[id number]] Collected: 09/01/23 1532    Specimen: Blood Updated: 09/01/23 1645     Extra Tube Hold for add-ons.     Comment: Auto resulted.       Lavender Top [[id number]] Collected: 09/01/23 1532    Specimen: Blood Updated: 09/01/23 1645     Extra Tube hold for add-on     Comment: Auto resulted       Light Blue Top [[id number]] Collected: 09/01/23 1532    Specimen: Blood Updated: 09/01/23 1645     Extra Tube Hold for add-ons.     Comment: Auto resulted       Gold Top - SST [[id number]] Collected: 09/01/23 1532    Specimen: Blood Updated: 09/01/23 1645     Extra Tube Hold for add-ons.     Comment: Auto resulted.       COVID PRE-OP / PRE-PROCEDURE SCREENING ORDER (NO ISOLATION) - Swab, Nasopharynx [[id number]]  (Normal) Collected: 09/01/23 1532    Specimen: Swab from Nasopharynx Updated: 09/01/23 1632    Narrative:      The following orders were created for panel order COVID PRE-OP / PRE-PROCEDURE SCREENING ORDER (NO ISOLATION) - Swab, Nasopharynx.  Procedure                               Abnormality         Status                     ---------                               -----------         ------                     COVID-19, FLU A/B, RSV P...[[id number]]  Normal              Final result                 Please view results for these tests on the individual orders.    COVID-19, FLU A/B, RSV PCR -  Swab, Nasopharynx [[id number]]  (Normal) Collected: 09/01/23 1532    Specimen: Swab from Nasopharynx Updated: 09/01/23 1632     COVID19 Not Detected     Influenza A PCR Not Detected     Influenza B PCR Not Detected     RSV, PCR Not Detected    Narrative:      Fact sheet for providers: https://www.fda.gov/media/001994/download    Fact sheet for patients: https://www.fda.gov/media/183534/download    Test performed by PCR.    BNP [[id number]]  (Abnormal) Collected: 09/01/23 1532    Specimen: Blood Updated: 09/01/23 1613     proBNP 939.0 pg/mL     Narrative:      Among patients with dyspnea, NT-proBNP is highly sensitive for the detection of acute congestive heart failure. In addition NT-proBNP of <300 pg/ml effectively rules out acute congestive heart failure with 99% negative predictive value.      Single High Sensitivity Troponin T [[id number]]  (Normal) Collected: 09/01/23 1532    Specimen: Blood Updated: 09/01/23 1613     HS Troponin T 12 ng/L     Narrative:      High Sensitive Troponin T Reference Range:  <10.0 ng/L- Negative Female for AMI  <15.0 ng/L- Negative Male for AMI  >=10 - Abnormal Female indicating possible myocardial injury.  >=15 - Abnormal Male indicating possible myocardial injury.   Clinicians would have to utilize clinical acumen, EKG, Troponin, and serial changes to determine if it is an Acute Myocardial Infarction or myocardial injury due to an underlying chronic condition.         Comprehensive Metabolic Panel [[id number]]  (Abnormal) Collected: 09/01/23 1532    Specimen: Blood Updated: 09/01/23 1609     Glucose 107 mg/dL      BUN 17 mg/dL      Creatinine 0.89 mg/dL      Sodium 143 mmol/L      Potassium 4.3 mmol/L      Comment: Slight hemolysis detected by analyzer. Results may be affected.        Chloride 107 mmol/L      CO2 23.0 mmol/L      Calcium 8.9 mg/dL      Total Protein 6.8 g/dL      Albumin 4.1 g/dL      ALT (SGPT) 47 U/L      AST (SGOT) 39 U/L      Alkaline Phosphatase 116 U/L      Total  Bilirubin 0.8 mg/dL      Globulin 2.7 gm/dL      Comment: Calculated Result        A/G Ratio 1.5 g/dL      BUN/Creatinine Ratio 19.1     Anion Gap 13.0 mmol/L      eGFR 96.9 mL/min/1.73     Narrative:      GFR Normal >60  Chronic Kidney Disease <60  Kidney Failure <15      CBC & Differential [[id number]]  (Abnormal) Collected: 09/01/23 1532    Specimen: Blood Updated: 09/01/23 1555    Narrative:      The following orders were created for panel order CBC & Differential.  Procedure                               Abnormality         Status                     ---------                               -----------         ------                     CBC Auto Differential[[id number]]        Abnormal            Final result                 Please view results for these tests on the individual orders.    CBC Auto Differential [[id number]]  (Abnormal) Collected: 09/01/23 1532    Specimen: Blood Updated: 09/01/23 1555     WBC 5.16 10*3/mm3      RBC 4.80 10*6/mm3      Hemoglobin 14.1 g/dL      Hematocrit 44.9 %      MCV 93.5 fL      MCH 29.4 pg      MCHC 31.4 g/dL      RDW 13.3 %      RDW-SD 45.9 fl      MPV 11.2 fL      Platelets 203 10*3/mm3      Neutrophil % 52.9 %      Lymphocyte % 35.7 %      Monocyte % 8.3 %      Eosinophil % 2.1 %      Basophil % 0.8 %      Immature Grans % 0.2 %      Neutrophils, Absolute 2.73 10*3/mm3      Lymphocytes, Absolute 1.84 10*3/mm3      Monocytes, Absolute 0.43 10*3/mm3      Eosinophils, Absolute 0.11 10*3/mm3      Basophils, Absolute 0.04 10*3/mm3      Immature Grans, Absolute 0.01 10*3/mm3      nRBC 0.0 /100 WBC     Cedillo Top [[id number]] Collected: 09/01/23 1532    Specimen: Blood Updated: 09/01/23 1542          ECG/EMG Results (last 24 hours)       Procedure Component Value Units Date/Time    ECG 12 Lead ED Triage Standing Order; SOA [[id number]] Collected: 09/01/23 1505     Updated: 09/01/23 1530     QT Interval 270 ms      QTC Interval 451 ms     Narrative:      Test Reason : ED Triage Standing  Order~  Blood Pressure :   */*   mmHG  Vent. Rate : 168 BPM     Atrial Rate : 135 BPM     P-R Int :   * ms          QRS Dur :  80 ms      QT Int : 270 ms       P-R-T Axes :   *  12   0 degrees     QTc Int : 451 ms    Atrial fibrillation with rapid ventricular response  Nonspecific T wave abnormality  Abnormal ECG  No previous ECGs available  Confirmed by ANDRA CRENSHAW MD ([id number]) on 9/1/2023 3:30:11 PM    Referred By: ED MD           Confirmed By: ANDRA CRENSHAW MD              Atrial fibrillation    Paroxysmal atrial fibrillation      Assessment/Plan:      Atrial fibrillation with RVR  Diltiazem drip initiated in the ED with minimal change in rate  We will give IV digoxin 500 mcg now and to 50 mcg in 6 hours  Labetalol drip also started for hypertension in the ED  I will start p.o. metoprolol in an effort to wean off of above drips  Initially placed on heparin drip for anticoagulation, will give a dose of Xarelto 20 mg this evening and DC heparin  [id number] (hypertension, acute CHF)  Discussed with the patient if remaining in A-fib with the RVR despite above medical interventions we will try to arrange for ARIANE/cardioversion at bedside in ICU tomorrow AM.  N.p.o. after midnight  2.  Acute heart failure   A.  On bedside echo EF appears low normal 45 to 50% in rapid A-fib   B.  Continue Lasix 40 mg IV daily already having a good response  ` C.  Suspect his EF will improve with restoration of sinus rhythm.   D.  No acute EKG changes, troponin negative      Discussed plan of care with Dr. Crenshaw and Dr. Moore.  Due to patient still being in RVR needing multiple IV drips to control rates in order to try and coordinate a ARIANE cardioversion tomorrow he will be admitted to ICU.        Roque Husain MD [facility] 9/1/2023 18:53 EDT              Electronically signed by Roque Husain MD at 09/01/23 2608       Fco Moore MD at 09/01/23 6607            INTENSIVIST   PROGRESS NOTE          SUBJECTIVE     David  62 y.o. male is followed for:Shortness of Breath       Paroxysmal atrial fibrillation    As an Intensivist, we provide an integrated approach to the ICU patient and family, medical management of comorbid conditions, including but not limited to electrolytes, glycemic control, organ dysfunction, lead interdisciplinary rounds and coordinate the care with all other services, including those from other specialists.     HPI:    David is a 62 y.o. male, who presented to this Hospital on 2023 because of dyspnea.     Initially he was seen [facility] an [facility], he had a CXR done and he was told to come to the ED because he had fluid in his lungs.    He was in A Fib with RVR.  He was hypertensive. BP was 172/144  He has noticed some dyspnea over past 2 days.  No previous h/o A Fib.    Dr. Raygoza called me to admit him to the ICU. He was started on Cardizem infusion but it had not controlled his rate. He was ready to start him on a labetalol infusion.    When I saw him in the ED, after being on Cardizem, Labetalol and doses of Digoxin, his HR was better. He was less dyspneic and more comfortable.    Temp  Min: 98.2 °F (36.8 °C)  Max: 98.4 °F (36.9 °C)     PMH: He  has a past medical history of Anxiety, GERD (gastroesophageal reflux disease), Hyperlipidemia, and Renal disorder.   PSxH: He  has a past surgical history that includes Circumcision, non- and Dental surgery (Bilateral).     Medications:  No current facility-administered medications on file prior to encounter.     Current Outpatient Medications on File Prior to Encounter   Medication Sig    aspirin 81 MG EC tablet Take 1 tablet by mouth Daily.    diclofenac (VOLTAREN) 75 MG EC tablet Take 1 tablet by mouth 2 (Two) Times a Day.    lovastatin (ALTOPREV) 40 MG 24 hr tablet Take 1 tablet by mouth Every Night.    multivitamin (Multiple Vitamin) tablet tablet Take 1 tablet by mouth Daily.    omeprazole (priLOSEC) 20 MG capsule Take 1 capsule by mouth  Daily.    sertraline (ZOLOFT) 100 MG tablet Take 1 tablet by mouth Daily.    Turmeric 500 MG capsule Take 1 capsule by mouth Daily.    vitamin B-12 (CYANOCOBALAMIN) 500 MCG tablet Take 1 tablet by mouth Daily. OTC    [DISCONTINUED] ondansetron ODT (ZOFRAN-ODT) 4 MG disintegrating tablet Take 1 tablet by mouth Every 6 (Six) Hours As Needed for Nausea or Vomiting.    [DISCONTINUED] oxyCODONE-acetaminophen (PERCOCET) 7.5-325 MG per tablet Take 1 tablet by mouth Every 6 (Six) Hours As Needed for Severe Pain .        Allergies: He has No Known Allergies.   FH: His family history is not on file.   SH: He  reports that he has never smoked. He has never been exposed to tobacco smoke. He has never used smokeless tobacco. He reports current alcohol use. He reports that he does not use drugs.     The patient's relevant past medical, surgical and social history were reviewed and updated in Epic as appropriate.        History     Last Reviewed by Fco Moore MD on 2023 at  6:48 PM    Sections Reviewed    Medical, Family, Surgical, Tobacco, Alcohol, Drug Use, Sexual Activity,   Social Documentation    Problem list reviewed by Fco Moore MD on 2023 at  6:48 PM  Medicines reviewed by Fco Moore MD on 2023 at  6:48 PM  Allergies reviewed by Fco Moore MD on 2023 at  6:48 PM       Review of Systems  As described in the HPI.       OBJECTIVE     Vitals:  Temp: 98.4 °F (36.9 °C) (23 1458) Temp  Min: 98.2 °F (36.8 °C)  Max: 98.4 °F (36.9 °C)   Temp core:      BP: (!) 132/117 (23 1830) BP  Min: 121/104  Max: 172/144   MAP (non-invasive) Noninvasive MAP (mmHg): 113 (23 1750) Noninvasive MAP (mmHg)  Av.7  Min: 110  Max: 156   Pulse: (!) 130 (23 1830) Pulse  Min: 71  Max: 179   Resp: 20 (23 1458) Resp  Min: 18  Max: 20   SpO2: 91 % (23 1830) SpO2  Min: 82 %  Max: 100 %   Device: room air (23)    Flow Rate:   No data recorded         23    Weight: 132 kg (290 lb)        Intake/Ouptut 24 hrs (7:00AM - 6:59 AM)  Intake & Output (last 3 days)         08/29 0701 08/30 0700 08/30 0701 08/31 0700 08/31 0701 09/01 0700 09/01 0701 09/02 0700    Urine (mL/kg/hr)    4200    Total Output    4200    Net    -4200                    Medications (drips):  dilTIAZem, Last Rate: 15 mg/hr (09/01/23 1616)  heparin, Last Rate: 7.5 Units/kg/hr (09/01/23 1550)  labetalol, Last Rate: 0.5 mg/min (09/01/23 1824)  Pharmacy to Dose Heparin      Physical Examination  Telemetry:  Rhythm: atrial rhythm (09/01/23 1723)  Atrial Rhythm: atrial fibrillation (09/01/23 1723)      Constitutional:  No acute distress.   Cardiovascular: IRR.    Respiratory: Normal breath sounds  (+) Crackles   Abdominal:  Soft with no tenderness.   Extremities: 1+ Edema   Neurological:   Alert, Oriented, Cooperative.                 Results Reviewed:  Laboratory  Microbiology  Radiology  Pathology    Hematology:  Results from last 7 days   Lab Units 09/01/23  1532   WBC 10*3/mm3 5.16   HEMOGLOBIN g/dL 14.1   MCV fL 93.5   PLATELETS 10*3/mm3 203     Results from last 7 days   Lab Units 09/01/23  1532   IMM GRAN % % 0.2   NEUTROS ABS 10*3/mm3 2.73   LYMPHS ABS 10*3/mm3 1.84   MONOS ABS 10*3/mm3 0.43   EOS ABS 10*3/mm3 0.11   BASOS ABS 10*3/mm3 0.04     Chemistry:  Estimated Creatinine Clearance: 127.8 mL/min (by C-G formula based on SCr of 0.89 mg/dL).    Results from last 7 days   Lab Units 09/01/23  1532   SODIUM mmol/L 143   POTASSIUM mmol/L 4.3   CHLORIDE mmol/L 107   CO2 mmol/L 23.0   BUN mg/dL 17   CREATININE mg/dL 0.89   GLUCOSE mg/dL 107*     Results from last 7 days   Lab Units 09/01/23  1532   CALCIUM mg/dL 8.9       Hepatic Panel:  Results from last 7 days   Lab Units 09/01/23  1532   ALBUMIN g/dL 4.1   BILIRUBIN mg/dL 0.8   AST (SGOT) U/L 39   ALT (SGPT) U/L 47*   ALK PHOS U/L 116        Coagulation Labs:  Results from last 7 days   Lab Units 09/01/23  1532   PROTIME Seconds 15.5*   INR   1.21*   APTT seconds 30.3*        Cardiac Labs:  Results from last 7 days   Lab Units 09/01/23  1532   PROBNP pg/mL 939.0*   HSTROP T ng/L 12       COVID-19  Lab Results   Component Value Date    COVID19 Not Detected 09/01/2023     Images:  XR Chest 2 View    Result Date: 9/1/2023  Impression: Cardiomegaly with pulmonary vascular congestion and small pleural effusions. Atelectasis present in the lung bases. No suspicious infiltrate or gross pulmonary edema Electronically Signed: Rojelio Huerta  9/1/2023 1:35 PM EDT  Workstation ID: [id number]     Echo:      Results: Reviewed.  I reviewed the patient's new laboratory and imaging results.  I independently reviewed the patient's new images.    Medications: Reviewed.    Assessment   A/P     Hospital:  LOS: 0 days   ICU: Patient does not have an ICU stay during this admission.     Active Hospital Problems    Diagnosis  POA    Paroxysmal atrial fibrillation [I48.0]  Unknown     David is a 62 y.o. male admitted on 9/1/2023 with No admission diagnoses are documented for this encounter.    Assessment/Management/Treatment Plan:    A Fib, Heart Failure and Hypertension - new onset. Reason for admission  Cardiovascular  HFmrEF (EF 41-49%)   HTN urgency - on admission  Dyslipidemia   GI/Hepatology  GERD  Anxiety  Edema Lower Extremities  Endocrine  Body mass index is 35.3 kg/m². Obese Class II: 35-39.9kg/m2  No history of T2 Diabetes    No results found for: HGBA1C        Diet: NPO Diet NPO Type: Strict NPO  Diet: Cardiac Diets; Healthy Heart (2-3 Na+); Texture: Regular Texture (IDDSI 7); Fluid Consistency: Thin (IDDSI 0)  No active supplement orders      Advance Directives: There are no questions and answers to display.        DVT prophylaxis:  Medical DVT prophylaxis orders are present.       In brief:  Discussed with Dr. Husain  Hemodynamic support  Diuresis, already started in ED  F/U PCXR in AM  May need ARIANE and ECV tomorrow.  Venous duplex LE  Disposition: Admit to  ICU    Plan of care and goals reviewed during interdisciplinary rounds.  I discussed the patient's findings and my recommendations with patient and consulting provider    MDM:    Problem(s) High due to: Acute or Chronic illness or injury that may poses a threat to life or bodily function  Data: High due to: Review of prior external records from each unique source, Review of the result(s) of each unique test, Ordering of each unique test, and Discuss management or test interpretation with external physician or NPP (not separately reported)    High      [x] Primary Attending Intensive Care Medicine - Nutrition Support   [] Consultant    Copied text in this note has been reviewed and is accurate as of 09/01/23      Electronically signed by Fco Moore MD at 09/01/23 2026       Lab Results (last 7 days)       Procedure Component Value Units Date/Time    Magnesium [[id number]]  (Normal) Collected: 09/04/23 0648    Specimen: Blood Updated: 09/04/23 0825     Magnesium 2.1 mg/dL     Comprehensive Metabolic Panel [[id number]]  (Abnormal) Collected: 09/04/23 0648    Specimen: Blood Updated: 09/04/23 0825     Glucose 101 mg/dL      BUN 19 mg/dL      Creatinine 0.93 mg/dL      Sodium 143 mmol/L      Potassium 4.1 mmol/L      Chloride 104 mmol/L      CO2 29.0 mmol/L      Calcium 9.0 mg/dL      Total Protein 6.0 g/dL      Albumin 3.8 g/dL      ALT (SGPT) 31 U/L      AST (SGOT) 23 U/L      Alkaline Phosphatase 113 U/L      Total Bilirubin 0.8 mg/dL      Globulin 2.2 gm/dL      Comment: Calculated Result        A/G Ratio 1.7 g/dL      BUN/Creatinine Ratio 20.4     Anion Gap 10.0 mmol/L      eGFR 92.8 mL/min/1.73     Narrative:      GFR Normal >60  Chronic Kidney Disease <60  Kidney Failure <15      Phosphorus [[id number]]  (Normal) Collected: 09/04/23 0648    Specimen: Blood Updated: 09/04/23 0825     Phosphorus 4.4 mg/dL     CBC & Differential [[id number]]  (Normal) Collected: 09/04/23 0648    Specimen: Blood Updated: 09/04/23  0759    Narrative:      The following orders were created for panel order CBC & Differential.  Procedure                               Abnormality         Status                     ---------                               -----------         ------                     CBC Auto Differential[[id number]]        Normal              Final result                 Please view results for these tests on the individual orders.    CBC Auto Differential [[id number]]  (Normal) Collected: 09/04/23 0648    Specimen: Blood Updated: 09/04/23 0759     WBC 5.53 10*3/mm3      RBC 4.96 10*6/mm3      Hemoglobin 14.6 g/dL      Hematocrit 45.3 %      MCV 91.3 fL      MCH 29.4 pg      MCHC 32.2 g/dL      RDW 13.1 %      RDW-SD 44.2 fl      MPV 11.2 fL      Platelets 210 10*3/mm3      Neutrophil % 54.4 %      Lymphocyte % 29.8 %      Monocyte % 8.5 %      Eosinophil % 6.0 %      Basophil % 0.9 %      Immature Grans % 0.4 %      Neutrophils, Absolute 3.01 10*3/mm3      Lymphocytes, Absolute 1.65 10*3/mm3      Monocytes, Absolute 0.47 10*3/mm3      Eosinophils, Absolute 0.33 10*3/mm3      Basophils, Absolute 0.05 10*3/mm3      Immature Grans, Absolute 0.02 10*3/mm3      nRBC 0.0 /100 WBC     Phosphorus [[id number]]  (Abnormal) Collected: 09/03/23 0503    Specimen: Blood Updated: 09/03/23 0733     Phosphorus 4.6 mg/dL     Basic Metabolic Panel [[id number]]  (Normal) Collected: 09/03/23 0503    Specimen: Blood Updated: 09/03/23 0732     Glucose 91 mg/dL      BUN 18 mg/dL      Creatinine 0.98 mg/dL      Sodium 143 mmol/L      Potassium 3.8 mmol/L      Chloride 105 mmol/L      CO2 27.0 mmol/L      Calcium 8.8 mg/dL      BUN/Creatinine Ratio 18.4     Anion Gap 11.0 mmol/L      eGFR 87.2 mL/min/1.73     Narrative:      GFR Normal >60  Chronic Kidney Disease <60  Kidney Failure <15      Magnesium [[id number]]  (Normal) Collected: 09/03/23 0503    Specimen: Blood Updated: 09/03/23 0732     Magnesium 2.1 mg/dL     CBC (No Diff) [[id number]]  (Normal)  Collected: 09/03/23 0503    Specimen: Blood Updated: 09/03/23 0718     WBC 5.60 10*3/mm3      RBC 4.85 10*6/mm3      Hemoglobin 14.2 g/dL      Hematocrit 44.7 %      MCV 92.2 fL      MCH 29.3 pg      MCHC 31.8 g/dL      RDW 13.2 %      RDW-SD 45.0 fl      MPV 11.8 fL      Platelets 208 10*3/mm3     Comprehensive Metabolic Panel [[id number]]  (Abnormal) Collected: 09/02/23 0557    Specimen: Blood from Arm, Right Updated: 09/02/23 0706     Glucose 106 mg/dL      BUN 17 mg/dL      Creatinine 0.85 mg/dL      Sodium 144 mmol/L      Potassium 3.8 mmol/L      Chloride 105 mmol/L      CO2 28.0 mmol/L      Calcium 8.7 mg/dL      Total Protein 6.3 g/dL      Albumin 3.9 g/dL      ALT (SGPT) 41 U/L      AST (SGOT) 32 U/L      Alkaline Phosphatase 110 U/L      Total Bilirubin 0.7 mg/dL      Globulin 2.4 gm/dL      Comment: Calculated Result        A/G Ratio 1.6 g/dL      BUN/Creatinine Ratio 20.0     Anion Gap 11.0 mmol/L      eGFR 98.2 mL/min/1.73     Narrative:      GFR Normal >60  Chronic Kidney Disease <60  Kidney Failure <15      Magnesium [[id number]]  (Normal) Collected: 09/02/23 0557    Specimen: Blood from Arm, Right Updated: 09/02/23 0706     Magnesium 2.0 mg/dL     Phosphorus [[id number]]  (Normal) Collected: 09/02/23 0557    Specimen: Blood from Arm, Right Updated: 09/02/23 0706     Phosphorus 3.3 mg/dL     proBNP [[id number]]  (Normal) Collected: 09/02/23 0557    Specimen: Blood from Arm, Right Updated: 09/02/23 0649     proBNP 667.2 pg/mL     Narrative:      Among patients with dyspnea, NT-proBNP is highly sensitive for the detection of acute congestive heart failure. In addition NT-proBNP of <300 pg/ml effectively rules out acute congestive heart failure with 99% negative predictive value.      CBC & Differential [[id number]]  (Normal) Collected: 09/02/23 0557    Specimen: Blood from Arm, Right Updated: 09/02/23 0630    Narrative:      The following orders were created for panel order CBC & Differential.  Procedure                                Abnormality         Status                     ---------                               -----------         ------                     CBC Auto Differential[[id number]]        Normal              Final result                 Please view results for these tests on the individual orders.    CBC Auto Differential [[id number]]  (Normal) Collected: 09/02/23 0557    Specimen: Blood from Arm, Right Updated: 09/02/23 0630     WBC 5.15 10*3/mm3      RBC 4.62 10*6/mm3      Hemoglobin 13.7 g/dL      Hematocrit 42.8 %      MCV 92.6 fL      MCH 29.7 pg      MCHC 32.0 g/dL      RDW 13.2 %      RDW-SD 44.9 fl      MPV 11.0 fL      Platelets 200 10*3/mm3      Neutrophil % 57.1 %      Lymphocyte % 31.8 %      Monocyte % 7.6 %      Eosinophil % 2.5 %      Basophil % 0.8 %      Immature Grans % 0.2 %      Neutrophils, Absolute 2.94 10*3/mm3      Lymphocytes, Absolute 1.64 10*3/mm3      Monocytes, Absolute 0.39 10*3/mm3      Eosinophils, Absolute 0.13 10*3/mm3      Basophils, Absolute 0.04 10*3/mm3      Immature Grans, Absolute 0.01 10*3/mm3      nRBC 0.0 /100 WBC     [place] Draw [[id number]] Collected: 09/01/23 1532    Specimen: Blood Updated: 09/01/23 1945    Narrative:      The following orders were created for panel order [place] Draw.  Procedure                               Abnormality         Status                     ---------                               -----------         ------                     Green Top (Gel)[[id number]]                                  Final result               Lavender Top[[id number]]                                     Final result               Gold Top - SST[[id number]]                                   Final result               [place] Top[[id number]]                                         Final result               Light Blue Top[[id number]]                                   Final result                 Please view results for these tests on the individual orders.    [place] Top  [[id number]] Collected: 09/01/23 1532    Specimen: Blood Updated: 09/01/23 1945     Extra Tube Hold for add-ons.     Comment: Auto resulted.       Magnesium [[id number]]  (Normal) Collected: 09/01/23 1532    Specimen: Blood Updated: 09/01/23 1929     Magnesium 2.0 mg/dL     Hemoglobin A1c [[id number]]  (Abnormal) Collected: 09/01/23 1532    Specimen: Blood Updated: 09/01/23 1928     Hemoglobin A1C 5.80 %     Narrative:      Hemoglobin A1C Ranges:    Increased Risk for Diabetes  5.7% to 6.4%  Diabetes                     >= 6.5%  Diabetic Goal                < 7.0%    TSH Rfx On Abnormal To Free T4 [[id number]]  (Normal) Collected: 09/01/23 1532    Specimen: Blood Updated: 09/01/23 1923     TSH 1.650 uIU/mL     Heparin Anti-Xa [[id number]]  (Abnormal) Collected: 09/01/23 1532    Specimen: Blood Updated: 09/01/23 1653     Heparin Anti-Xa (UFH) 0.10 IU/ml     aPTT [[id number]]  (Abnormal) Collected: 09/01/23 1532    Specimen: Blood Updated: 09/01/23 1652     PTT 30.3 seconds     Narrative:      PTT = The equivalent PTT values for the therapeutic range of heparin levels at 0.3 to 0.5 U/ml are 60 to 70 seconds.    Protime-INR [[id number]]  (Abnormal) Collected: 09/01/23 1532    Specimen: Blood Updated: 09/01/23 1652     Protime 15.5 Seconds      INR 1.21    Green Top (Gel) [[id number]] Collected: 09/01/23 1532    Specimen: Blood Updated: 09/01/23 1645     Extra Tube Hold for add-ons.     Comment: Auto resulted.       Lavender Top [[id number]] Collected: 09/01/23 1532    Specimen: Blood Updated: 09/01/23 1645     Extra Tube hold for add-on     Comment: Auto resulted       Light Blue Top [[id number]] Collected: 09/01/23 1532    Specimen: Blood Updated: 09/01/23 1645     Extra Tube Hold for add-ons.     Comment: Auto resulted       Gold Top - SST [[id number]] Collected: 09/01/23 1532    Specimen: Blood Updated: 09/01/23 1645     Extra Tube Hold for add-ons.     Comment: Auto resulted.       COVID PRE-OP / PRE-PROCEDURE SCREENING ORDER  (NO ISOLATION) - Swab, Nasopharynx [[id number]]  (Normal) Collected: 09/01/23 1532    Specimen: Swab from Nasopharynx Updated: 09/01/23 1632    Narrative:      The following orders were created for panel order COVID PRE-OP / PRE-PROCEDURE SCREENING ORDER (NO ISOLATION) - Swab, Nasopharynx.  Procedure                               Abnormality         Status                     ---------                               -----------         ------                     COVID-19, FLU A/B, RSV P...[[id number]]  Normal              Final result                 Please view results for these tests on the individual orders.    COVID-19, FLU A/B, RSV PCR - Swab, Nasopharynx [[id number]]  (Normal) Collected: 09/01/23 1532    Specimen: Swab from Nasopharynx Updated: 09/01/23 1632     COVID19 Not Detected     Influenza A PCR Not Detected     Influenza B PCR Not Detected     RSV, PCR Not Detected    Narrative:      Fact sheet for providers: https://www.fda.gov/media/507869/download    Fact sheet for patients: https://www.fda.gov/media/726638/download    Test performed by PCR.    BNP [[id number]]  (Abnormal) Collected: 09/01/23 1532    Specimen: Blood Updated: 09/01/23 1613     proBNP 939.0 pg/mL     Narrative:      Among patients with dyspnea, NT-proBNP is highly sensitive for the detection of acute congestive heart failure. In addition NT-proBNP of <300 pg/ml effectively rules out acute congestive heart failure with 99% negative predictive value.      Single High Sensitivity Troponin T [[id number]]  (Normal) Collected: 09/01/23 1532    Specimen: Blood Updated: 09/01/23 1613     HS Troponin T 12 ng/L     Narrative:      High Sensitive Troponin T Reference Range:  <10.0 ng/L- Negative Female for AMI  <15.0 ng/L- Negative Male for AMI  >=10 - Abnormal Female indicating possible myocardial injury.  >=15 - Abnormal Male indicating possible myocardial injury.   Clinicians would have to utilize clinical acumen, EKG, Troponin, and serial changes  to determine if it is an Acute Myocardial Infarction or myocardial injury due to an underlying chronic condition.         Comprehensive Metabolic Panel [[id number]]  (Abnormal) Collected: 09/01/23 1532    Specimen: Blood Updated: 09/01/23 1609     Glucose 107 mg/dL      BUN 17 mg/dL      Creatinine 0.89 mg/dL      Sodium 143 mmol/L      Potassium 4.3 mmol/L      Comment: Slight hemolysis detected by analyzer. Results may be affected.        Chloride 107 mmol/L      CO2 23.0 mmol/L      Calcium 8.9 mg/dL      Total Protein 6.8 g/dL      Albumin 4.1 g/dL      ALT (SGPT) 47 U/L      AST (SGOT) 39 U/L      Alkaline Phosphatase 116 U/L      Total Bilirubin 0.8 mg/dL      Globulin 2.7 gm/dL      Comment: Calculated Result        A/G Ratio 1.5 g/dL      BUN/Creatinine Ratio 19.1     Anion Gap 13.0 mmol/L      eGFR 96.9 mL/min/1.73     Narrative:      GFR Normal >60  Chronic Kidney Disease <60  Kidney Failure <15      CBC & Differential [[id number]]  (Abnormal) Collected: 09/01/23 1532    Specimen: Blood Updated: 09/01/23 1555    Narrative:      The following orders were created for panel order CBC & Differential.  Procedure                               Abnormality         Status                     ---------                               -----------         ------                     CBC Auto Differential[[id number]]        Abnormal            Final result                 Please view results for these tests on the individual orders.    CBC Auto Differential [[id number]]  (Abnormal) Collected: 09/01/23 1532    Specimen: Blood Updated: 09/01/23 1555     WBC 5.16 10*3/mm3      RBC 4.80 10*6/mm3      Hemoglobin 14.1 g/dL      Hematocrit 44.9 %      MCV 93.5 fL      MCH 29.4 pg      MCHC 31.4 g/dL      RDW 13.3 %      RDW-SD 45.9 fl      MPV 11.2 fL      Platelets 203 10*3/mm3      Neutrophil % 52.9 %      Lymphocyte % 35.7 %      Monocyte % 8.3 %      Eosinophil % 2.1 %      Basophil % 0.8 %      Immature Grans % 0.2 %       Neutrophils, Absolute 2.73 10*3/mm3      Lymphocytes, Absolute 1.84 10*3/mm3      Monocytes, Absolute 0.43 10*3/mm3      Eosinophils, Absolute 0.11 10*3/mm3      Basophils, Absolute 0.04 10*3/mm3      Immature Grans, Absolute 0.01 10*3/mm3      nRBC 0.0 /100 WBC           Imaging Results (Last 7 Days)       Procedure Component Value Units Date/Time    XR Chest 1 View [[id number]] Collected: 09/02/23 0848     Updated: 09/02/23 0854    Narrative:      XR CHEST 1 VW    Date of Exam: 9/2/2023 3:45 AM EDT    Indication: A Fib, heart failure    Comparison: 9/1/2023 and prior    Findings:  Study is limited by overlying support and monitoring apparatus. Heart size is enlarged. This is more prominent than the comparison likely related to lower lung volumes. Pulm vascularity demonstrates mild pulmonary vascular congestion. Increased hazy and   interstitial opacities noted dependently are accentuated by lower lung volumes. There is mild blunting at the right costophrenic angle and minimal focal dependent opacity. Valuation of the retrocardiac aspect of the left base is limited. Osseous   structures are stable.        Impression:      Impression:    1. Cardiomegaly and mild pulmonary vascular congestion with vascular crowding at the lung bases. Findings are accentuated by low lung volumes.    2. Dependent opacities which represent atelectasis and/or mild degree of pulmonary edema and small bilateral pleural effusions noted.    2. Findings appear more prominent in the comparison although this is likely in part due to differences in technique      Electronically Signed: Jaswant Cisneros MD    9/2/2023 8:51 AM EDT    Workstation ID: [id number]          ECG/EMG Results (last 7 days)       Procedure Component Value Units Date/Time    ECG 12 Lead ED Triage Standing Order; SOA [[id number]] Collected: 09/01/23 1505     Updated: 09/01/23 1530     QT Interval 270 ms      QTC Interval 451 ms     Narrative:      Test Reason : ED Triage  Standing Order~  Blood Pressure :   */*   mmHG  Vent. Rate : 168 BPM     Atrial Rate : 135 BPM     P-R Int :   * ms          QRS Dur :  80 ms      QT Int : 270 ms       P-R-T Axes :   *  12   0 degrees     QTc Int : 451 ms    Atrial fibrillation with rapid ventricular response  Nonspecific T wave abnormality  Abnormal ECG  No previous ECGs available  Confirmed by ANDRA CRENSHAW MD ([id number]) on 9/1/2023 3:30:11 PM    Referred By: ED MD           Confirmed By: ANDRA CRENSHAW MD    ECG 12 Lead Drug Monitoring; Amiodarone [[id number]] Collected: 09/02/23 1303     Updated: 09/02/23 1303     QT Interval 382 ms      QTC Interval 472 ms     Narrative:      Test Reason : Drug Monitoring  Blood Pressure :   */*   mmHG  Vent. Rate :  92 BPM     Atrial Rate : 113 BPM     P-R Int :   * ms          QRS Dur :  92 ms      QT Int : 382 ms       P-R-T Axes :   *  -1   2 degrees     QTc Int : 472 ms    Atrial fibrillation with premature ventricular or aberrantly conducted complexes  Possible Anterior infarct , age undetermined  Abnormal ECG  When compared with ECG of 01-SEP-2023 15:05,  Vent. rate has decreased BY  76 BPM    Referred By:            Confirmed By:     Adult Transesophageal Echo (ARIANE) W/ Cont if Necessary Per Protocol [[id number]] Resulted: 09/02/23 1532     Updated: 09/02/23 1535    Narrative:        Left ventricular systolic function is mildly decreased. Left   ventricular ejection fraction appears to be 41 - 45%.    No evidence of a left atrial appendage thrombus was present.    Trace to mild aortic valve regurgitation is present.    Mild mitral valve regurgitation is present.      SCANNED - TELEMETRY   [[id number]] Resulted: 09/01/23     Updated: 09/04/23 0435             Physician Progress Notes (last 7 days)        Sunshine Hadley PA at 09/04/23 0716       Attestation signed by Akil Schrader MD at 09/05/23 1357    I have reviewed this documentation and agree.                  [facility]  [place]  Progress Note       LOS: 3 days   Patient Care Team:  Deniz Chang MD as PCP - General (Family Medicine)    Chief Complaint:  atrial fibrillation    Subjective     Patient is doing well this AM. Remains in AFIB, but now rate controlled on higher dose of Metoprolol. Walked in hallway yesterday x 2 on RA with normal O2 sats. On RA now, just 2L at night. No SOB, cough, or CP. Feels ready to go home.         Review of Systems:   Pertinent positives in HPI, all others reviewed and negative.      Objective       Current Facility-Administered Medications:     amiodarone (PACERONE) tablet 200 mg, 200 mg, Oral, TID, Sunshine Hadley PA, 200 mg at 09/03/23 2048    atorvastatin (LIPITOR) tablet 10 mg, 10 mg, Oral, Daily, Roque Husain MD, 10 mg at 09/03/23 0808    furosemide (LASIX) tablet 40 mg, 40 mg, Oral, Daily, Sunshine Hadley PA    metoprolol tartrate (LOPRESSOR) tablet 50 mg, 50 mg, Oral, Q12H, Sunshine Hadley PA, 50 mg at 09/04/23 0522    ondansetron (ZOFRAN) injection 4 mg, 4 mg, Intravenous, Q6H PRN, Fco Moore MD    pantoprazole (PROTONIX) EC tablet 40 mg, 40 mg, Oral, Q AM, Roque Husain MD, 40 mg at 09/04/23 0522    Potassium Replacement - Follow Nurse / BPA Driven Protocol, , Does not apply, PRN, Fco Moore MD    rivaroxaban (XARELTO) tablet 20 mg, 20 mg, Oral, Daily With Dinner, Roque Husain MD, 20 mg at 09/03/23 1709    sertraline (ZOLOFT) tablet 100 mg, 100 mg, Oral, Daily, Roque Husain MD, 100 mg at 09/03/23 0808    sodium chloride 0.9 % flush 10 mL, 10 mL, Intravenous, PRN, Emergency, Triage Protocol, MD    Vital Sign Min/Max for last 24 hours  Temp  Min: 97.1 °F (36.2 °C)  Max: 98.7 °F (37.1 °C)   BP  Min: 104/86  Max: 139/116   Pulse  Min: 56  Max: 122   Resp  Min: 16  Max: 20   SpO2  Min: 93 %  Max: 96 %   Flow (L/min)  Min: 1  Max: 1   No data recorded     Flowsheet Rows      Flowsheet Row First Filed Value   Admission Height 193 cm  "(76\") Documented at 09/01/2023 1458   Admission Weight 132 kg (290 lb) Documented at 09/01/2023 1458              Intake/Output Summary (Last 24 hours) at 9/4/2023 0716  Last data filed at 9/3/2023 2046  Gross per 24 hour   Intake --   Output 2350 ml   Net -2350 ml       Physical Exam:     General Appearance:    Alert, cooperative, in no acute distress   Lungs:     Clear to auscultation bilaterally,respirations regular, even and unlabored    Heart:    Irr, irr, normal rate  normal S1 and S2,   no  murmur, no gallop, no rub, no click   Chest Wall:    No abnormalities observed       Extremities:  Moves all extremities well, no edema, no cyanosis, no            redness              Pulses:   Pulses palpable and equal bilaterally   Skin:   No bleeding, bruising or rash        Results Review:   Results from last 7 days   Lab Units 09/03/23  0503 09/02/23  0557 09/01/23  1532   WBC 10*3/mm3 5.60 5.15 5.16   HEMOGLOBIN g/dL 14.2 13.7 14.1   HEMATOCRIT % 44.7 42.8 44.9   PLATELETS 10*3/mm3 208 200 203     Results from last 7 days   Lab Units 09/03/23  0503 09/02/23  0557 09/01/23  1532   SODIUM mmol/L 143 144 143   POTASSIUM mmol/L 3.8 3.8 4.3   CHLORIDE mmol/L 105 105 107   CO2 mmol/L 27.0 28.0 23.0   BUN mg/dL 18 17 17   CREATININE mg/dL 0.98 0.85 0.89   GLUCOSE mg/dL 91 106* 107*      Results from last 7 days   Lab Units 09/01/23  1532   HEMOGLOBIN A1C % 5.80*         Results from last 7 days   Lab Units 09/01/23  1532   TSH uIU/mL 1.650     Results from last 7 days   Lab Units 09/02/23  0557   PROBNP pg/mL 667.2     Results from last 7 days   Lab Units 09/01/23  1532   PROTIME Seconds 15.5*   INR  1.21*   APTT seconds 30.3*     Results from last 7 days   Lab Units 09/01/23  1532   HSTROP T ng/L 12       Intake/Output Summary (Last 24 hours) at 9/4/2023 0716  Last data filed at 9/3/2023 2046  Gross per 24 hour   Intake --   Output 2350 ml   Net -2350 ml       I personally viewed and interpreted the patient's " EKG/Telemetry data    EKG: atrial fibrillation 108 bpm, QTc stable.     Telemetry: atrial fibrillation  bpm    Ejection Fraction  No results found for: EF    Echo EF Estimated  No results found for: ECHOEFEST      Present on Admission:   Paroxysmal atrial fibrillation   Atrial fibrillation    Assessment & Plan   Persistent Atrial fibrillation  Attempted cardioversion 2023 but with early return of A-fib  Started on IV Amiodarone protocol.  Continue Metoprolol 50 mg BID for rate control.  Continue Xarelto for anticoagulation, [id number] 2  Now on Amiodarone 200 mg TID x 10 days, then 200 mg daily with plan for outpatient ECV in 2-3 weeks with Dr. Husain.   EF appears to be 41 to 45% ARIANE  Acute heart failure with mildly reduced ejection fraction  Secondary to A-fib with RVR  Clinically improved s/p IV Lasix continue 40 mg IV daily. Now on RA. Transition to oral  Lasix 40 mg daily and would d/c home on Lasix 40 mg with KCL 20 meq daily.   EF 41-45%.   Metoprolol tartrate for now due to need for rate control. No ACE/Entresto for now due to marginal BPs. Consider adding as outpatient.     Postprocedural hypotension  Suspect hypotension related to sedation post procedur  Did give back 500 cc of IV saline  Resolved now.     Plan for disposition: ok home today from cardiology standpoint. Will schedule ECV in 2 weeks after Amiodarone load. Follow up with Dr. Husain in 2 months.     Electronically signed by BRODIE Mckinnon, 23, 7:16 AM EDT.               Electronically signed by Akil Schrader MD at 23 1350       Nicolas Perez MD at 23 1218              [facility] Medicine Services  PROGRESS NOTE    Patient Name: David Jean Baptiste  : 1960  MRN: [id number]    Date of Admission: 2023  Primary Care Physician: Deniz Chang MD    Subjective   Subjective     CC:  Follow-up for A-fib    HPI:  I have seen and evaluated the patient this  morning.  Feeling much better.  Shortness of breath much improved.  Started on amiodarone drip yesterday.  Heart rate is 110 and still A-fib.  No chest pain.    ROS:  General : no fevers, chills  CVS: No chest pain, palpitations  Respiratory: No cough, dyspnea  GI: No N/V/D, abd pain  10 point review of systems is negative except for what is mentioned in HPI    Objective   Objective     Vital Signs:   Temp:  [97.1 °F (36.2 °C)-98.2 °F (36.8 °C)] 97.1 °F (36.2 °C)  Heart Rate:  [] 121  Resp:  [16-18] 18  BP: ()/() 139/116  Flow (L/min):  [1-4] 1     Physical Exam:  General: Comfortable, not in distress, conversant and cooperative  Head: Atraumatic and normocephalic  Eyes: No Icterus. No pallor  Ears:  Ears appear intact with no abnormalities noted  Throat: No oral lesions, no thrush  Neck: Supple, trachea midline  Lungs: Clear to auscultation bilaterally, equal air entry, no wheezing or crackles  Heart:  Normal S1 and S2, no murmur, no gallop, No JVD, no lower extremity swelling  Abdomen:  Soft, no tenderness, no organomegaly, normal bowel sounds, no organomegaly  Extremities: pulses equal bilaterally  Skin: No bleeding, bruising or rash, normal skin turgor and elasticity  Neurologic: Cranial nerves appear intact with no evidence of facial asymmetry, normal motor and sensory functions in all 4 extremities  Psych: Alert and oriented x 3, normal mood    Results Reviewed:  LAB RESULTS:      Lab 09/03/23  0503 09/02/23  0557 09/01/23  1532   WBC 5.60 5.15 5.16   HEMOGLOBIN 14.2 13.7 14.1   HEMATOCRIT 44.7 42.8 44.9   PLATELETS 208 200 203   NEUTROS ABS  --  2.94 2.73   IMMATURE GRANS (ABS)  --  0.01 0.01   LYMPHS ABS  --  1.64 1.84   MONOS ABS  --  0.39 0.43   EOS ABS  --  0.13 0.11   MCV 92.2 92.6 93.5   PROTIME  --   --  15.5*   APTT  --   --  30.3*   HEPARIN ANTI-XA  --   --  0.10*         Lab 09/03/23  0503 09/02/23  0557 09/01/23  1532   SODIUM 143 144 143   POTASSIUM 3.8 3.8 4.3   CHLORIDE 105 105  107   CO2 27.0 28.0 23.0   ANION GAP 11.0 11.0 13.0   BUN 18 17 17   CREATININE 0.98 0.85 0.89   EGFR 87.2 98.2 96.9   GLUCOSE 91 106* 107*   CALCIUM 8.8 8.7 8.9   MAGNESIUM 2.1 2.0 2.0   PHOSPHORUS 4.6* 3.3  --    HEMOGLOBIN A1C  --   --  5.80*   TSH  --   --  1.650         Lab 09/02/23  0557 09/01/23  1532   TOTAL PROTEIN 6.3 6.8   ALBUMIN 3.9 4.1   GLOBULIN 2.4 2.7   ALT (SGPT) 41 47*   AST (SGOT) 32 39   BILIRUBIN 0.7 0.8   ALK PHOS 110 116         Lab 09/02/23  0557 09/01/23  1532   PROBNP 667.2 939.0*   HSTROP T  --  12   PROTIME  --  15.5*   INR  --  1.21*                 Brief Urine Lab Results       None            Microbiology Results Abnormal       Procedure Component Value - Date/Time    COVID PRE-OP / PRE-PROCEDURE SCREENING ORDER (NO ISOLATION) - Swab, Nasopharynx [[id number]]  (Normal) Collected: 09/01/23 1532    Lab Status: Final result Specimen: Swab from Nasopharynx Updated: 09/01/23 1632    Narrative:      The following orders were created for panel order COVID PRE-OP / PRE-PROCEDURE SCREENING ORDER (NO ISOLATION) - Swab, Nasopharynx.  Procedure                               Abnormality         Status                     ---------                               -----------         ------                     COVID-19, FLU A/B, RSV P...[[id number]]  Normal              Final result                 Please view results for these tests on the individual orders.    COVID-19, FLU A/B, RSV PCR - Swab, Nasopharynx [[id number]]  (Normal) Collected: 09/01/23 1532    Lab Status: Final result Specimen: Swab from Nasopharynx Updated: 09/01/23 1632     COVID19 Not Detected     Influenza A PCR Not Detected     Influenza B PCR Not Detected     RSV, PCR Not Detected    Narrative:      Fact sheet for providers: https://www.fda.gov/media/502512/download    Fact sheet for patients: https://www.fda.gov/media/234476/download    Test performed by PCR.            Adult Transesophageal Echo (ARIANE) W/ Cont if Necessary Per  Protocol    Result Date: 9/2/2023    Left ventricular systolic function is mildly decreased. Left ventricular ejection fraction appears to be 41 - 45%.   No evidence of a left atrial appendage thrombus was present.   Trace to mild aortic valve regurgitation is present.   Mild mitral valve regurgitation is present.     XR Chest 2 View    Result Date: 9/1/2023  XR CHEST 2 VW Date of Exam: 9/1/2023 1:25 PM EDT Indication: cough Comparison: None available. Findings: Cardiomegaly. Prominent central pulmonary vessels. Small bilateral pleural effusions. Strandy atelectasis in the lung bases. No asymmetric infiltrate or edema     Impression: Impression: Cardiomegaly with pulmonary vascular congestion and small pleural effusions. Atelectasis present in the lung bases. No suspicious infiltrate or gross pulmonary edema Electronically Signed: Rojelio Huerta  9/1/2023 1:35 PM EDT  Workstation ID: [id number]    XR Chest 1 View    Result Date: 9/2/2023  XR CHEST 1 VW Date of Exam: 9/2/2023 3:45 AM EDT Indication: A Fib, heart failure Comparison: 9/1/2023 and prior Findings: Study is limited by overlying support and monitoring apparatus. Heart size is enlarged. This is more prominent than the comparison likely related to lower lung volumes. Pulm vascularity demonstrates mild pulmonary vascular congestion. Increased hazy and interstitial opacities noted dependently are accentuated by lower lung volumes. There is mild blunting at the right costophrenic angle and minimal focal dependent opacity. Valuation of the retrocardiac aspect of the left base is limited. Osseous structures are stable.     Impression: Impression: 1. Cardiomegaly and mild pulmonary vascular congestion with vascular crowding at the lung bases. Findings are accentuated by low lung volumes. 2. Dependent opacities which represent atelectasis and/or mild degree of pulmonary edema and small bilateral pleural effusions noted. 2. Findings appear more prominent in the  comparison although this is likely in part due to differences in technique Electronically Signed: Jaswant Cisneros MD  9/2/2023 8:51 AM EDT  Workstation ID: [id number]     Results for orders placed during the hospital encounter of 09/01/23    Adult Transesophageal Echo (ARIANE) W/ Cont if Necessary Per Protocol    Interpretation Summary    Left ventricular systolic function is mildly decreased. Left ventricular ejection fraction appears to be 41 - 45%.    No evidence of a left atrial appendage thrombus was present.    Trace to mild aortic valve regurgitation is present.    Mild mitral valve regurgitation is present.      Current medications:  Scheduled Meds:amiodarone, 200 mg, Oral, TID  atorvastatin, 10 mg, Oral, Daily  [START ON 9/4/2023] furosemide, 40 mg, Oral, Daily  metoprolol tartrate, 50 mg, Oral, Q12H  pantoprazole, 40 mg, Oral, Q AM  rivaroxaban, 20 mg, Oral, Daily With Dinner  sertraline, 100 mg, Oral, Daily      Continuous Infusions:amiodarone, 0.5 mg/min, Last Rate: 0.5 mg/min (09/03/23 0748)      PRN Meds:.  ondansetron    Potassium Replacement - Follow Nurse / BPA Driven Protocol    sodium chloride    Assessment & Plan   Assessment & Plan     Active Hospital Problems    Diagnosis  POA    **Atrial fibrillation [I48.91]  Yes    Paroxysmal atrial fibrillation [I48.0]  Yes      Resolved Hospital Problems   No resolved problems to display.        Brief Hospital Course to date:  David Jean Baptiste is a 62 y.o. male with past medical history of essential hypertension, chronic pain, paroxysmal A-fib who presented to the hospital with A-fib RVR     Assessment and plan:  A-fib with RVR  Underwent ARIANE and cardioversion  09/02/2023 but quickly flipped into A-fib  Currently on amiodarone drip  Continue Xarelto for anticoagulation  Cardiology team following    Systolic heart failure, compensated  Essential hypertension  Ejection fraction is 40 to 45% per  ARIANE  Likely secondary to A-fib/arrhythmia  Improved with  diuresis  Continue metoprolol          Expected Discharge Location and Transportation: Home  Expected Discharge   Expected discharge date/ time has not been documented.     DVT prophylaxis:  Medical and mechanical DVT prophylaxis orders are present.          CODE STATUS:   Code Status and Medical Interventions:   Ordered at: 23 1851     Code Status (Patient has no pulse and is not breathing):    CPR (Attempt to Resuscitate)     Medical Interventions (Patient has pulse or is breathing):    Full Support       Nicolas Perez MD  23        Electronically signed by Nicolas Perez MD at 23 1223       Sunshine Hadley PA at 23 0944       Attestation signed by Akil Schrader MD at 23 1350    I have reviewed this documentation and agree.                  [facility]  Progress Note       LOS: 2 days   Patient Care Team:  Deniz Chang MD as PCP - General (Family Medicine)    Chief Complaint:  atrial fibrillation    Subjective     Feeling better this AM. Has not ambulated yet. HRs low 100s. On amio drip.   BP in 120s systolic. No CP or SOB.           Review of Systems:   Pertinent positives in HPI, all others reviewed and negative.      Objective       Current Facility-Administered Medications:     amiodarone (PACERONE) tablet 200 mg, 200 mg, Oral, TID, Sunshine Hadley PA    [COMPLETED] amiodarone 150 mg in 100 mL D5W (loading dose), 150 mg, Intravenous, Once, 150 mg at 23 1249 **FOLLOWED BY** [] amiodarone 360 mg in 200 mL D5W infusion, 1 mg/min, Intravenous, Continuous, Stopped at 23 1848 **FOLLOWED BY** amiodarone 360 mg in 200 mL D5W infusion, 0.5 mg/min, Intravenous, Continuous, Roque Husain MD, Last Rate: 16.67 mL/hr at 23 0748, 0.5 mg/min at 23 0748    atorvastatin (LIPITOR) tablet 10 mg, 10 mg, Oral, Daily, Roque Husain MD, 10 mg at 23 0808    [START ON 2023]  "furosemide (LASIX) tablet 40 mg, 40 mg, Oral, Daily, Sunshine Hadley PA    metoprolol tartrate (LOPRESSOR) tablet 50 mg, 50 mg, Oral, Q12H, Sunshine Hadley PA    ondansetron (ZOFRAN) injection 4 mg, 4 mg, Intravenous, Q6H PRN, Fco Moore MD    pantoprazole (PROTONIX) EC tablet 40 mg, 40 mg, Oral, Q AM, Roque Husain MD, 40 mg at 09/03/23 0550    Potassium Replacement - Follow Nurse / BPA Driven Protocol, , Does not apply, PRN, Fco Moore MD    rivaroxaban (XARELTO) tablet 20 mg, 20 mg, Oral, Daily With Dinner, Roque Husain MD, 20 mg at 09/02/23 1826    sertraline (ZOLOFT) tablet 100 mg, 100 mg, Oral, Daily, Roque Husain MD, 100 mg at 09/03/23 0808    sodium chloride 0.9 % flush 10 mL, 10 mL, Intravenous, PRN, Emergency, Triage Protocol, MD    Vital Sign Min/Max for last 24 hours  Temp  Min: 97.1 °F (36.2 °C)  Max: 98.2 °F (36.8 °C)   BP  Min: 88/64  Max: 139/116   Pulse  Min: 72  Max: 121   Resp  Min: 16  Max: 18   SpO2  Min: 83 %  Max: 99 %   Flow (L/min)  Min: 1  Max: 6   Weight  Min: 132 kg (291 lb 0.1 oz)  Max: 132 kg (291 lb 0.1 oz)     Flowsheet Rows      Flowsheet Row First Filed Value   Admission Height 193 cm (76\") Documented at 09/01/2023 1458   Admission Weight 132 kg (290 lb) Documented at 09/01/2023 1458              Intake/Output Summary (Last 24 hours) at 9/3/2023 1048  Last data filed at 9/3/2023 0930  Gross per 24 hour   Intake 1151.85 ml   Output 1925 ml   Net -773.15 ml       Physical Exam:     General Appearance:    Alert, cooperative, in no acute distress   Lungs:     Clear to auscultation bilaterally,respirations regular, even and unlabored    Heart:    Irr, irr, tachycardic,  normal S1 and S2,   no        murmur, no gallop, no rub, no click   Chest Wall:    No abnormalities observed       Extremities:  Moves all extremities well, no edema, no cyanosis, no            redness              Pulses:   Pulses palpable and equal bilaterally   Skin:   No " bleeding, bruising or rash        Results Review:   Results from last 7 days   Lab Units 09/03/23  0503 09/02/23  0557 09/01/23  1532   WBC 10*3/mm3 5.60 5.15 5.16   HEMOGLOBIN g/dL 14.2 13.7 14.1   HEMATOCRIT % 44.7 42.8 44.9   PLATELETS 10*3/mm3 208 200 203     Results from last 7 days   Lab Units 09/03/23  0503 09/02/23  0557 09/01/23  1532   SODIUM mmol/L 143 144 143   POTASSIUM mmol/L 3.8 3.8 4.3   CHLORIDE mmol/L 105 105 107   CO2 mmol/L 27.0 28.0 23.0   BUN mg/dL 18 17 17   CREATININE mg/dL 0.98 0.85 0.89   GLUCOSE mg/dL 91 106* 107*      Results from last 7 days   Lab Units 09/01/23  1532   HEMOGLOBIN A1C % 5.80*         Results from last 7 days   Lab Units 09/01/23  1532   TSH uIU/mL 1.650     Results from last 7 days   Lab Units 09/02/23  0557   PROBNP pg/mL 667.2     Results from last 7 days   Lab Units 09/01/23  1532   PROTIME Seconds 15.5*   INR  1.21*   APTT seconds 30.3*     Results from last 7 days   Lab Units 09/01/23  1532   HSTROP T ng/L 12       Intake/Output Summary (Last 24 hours) at 9/3/2023 1048  Last data filed at 9/3/2023 0930  Gross per 24 hour   Intake 1151.85 ml   Output 1925 ml   Net -773.15 ml       I personally viewed and interpreted the patient's EKG/Telemetry data    EKG: none for review today    Telemetry: atrial fibrillation  bpm     Ejection Fraction  No results found for: EF    Echo EF Estimated  No results found for: ECHOEFEST      Present on Admission:   Paroxysmal atrial fibrillation   Atrial fibrillation    Assessment & Plan   Persistent Atrial fibrillation  Attempted cardioversion 9/2/2023 but with early return of A-fib  Started on IV Amiodarone protocol.  Continue metoprolol as blood pressure allows with hold parameters, will increase to 50 mg BID for better rate control   Continue Xarelto for anticoagulation, [id number] 2  Will transition to oral Amiodarone 200 mg TID x 10 days, then 200 mg daily with plan for outpatient ECV in 2-3 weeks with Dr. Husain.   EF  "appears to be 41 to 45% ARIANE  Acute heart failure with mildly reduced ejection fraction  Secondary to A-fib with RVR  Having good output with IV Lasix continue 40 mg IV daily. Transition to oral tomorrow.   EF 41-45%.   Metoprolol tartrate for now due to need for rate control. No ACE/Entresto for now due to marginal BPs. Consider adding as outpatient.   Postprocedural hypotension  Suspect hypotension related to sedation post procedur  Did give back 500 cc of IV saline  Resolved now.     Plan for disposition: Increase Metoprolol today, transition to PO Amiodarone and PO lasix as above. Assess in AM, anticipate d/c home tomorrow.     Electronically signed by BRODIE Mckinnon, 09/03/23, 9:44 AM EDT.             Electronically signed by Akil Schrader MD at 09/05/23 1350       Roque Husain MD at 09/02/23 1230          [facility] Progress Note     LOS: 1 day   Patient Care Team:  Deniz Chang MD as PCP - General (Family Medicine)  PCP:  Deniz Chang MD    Chief Complaint: Follow-up atrial fibrillation, acute heart failure    Subjective: Patient underwent attempted ARIANE/cardioversion today.  Sinus rhythm was briefly achieved but he had early return of A-fib.  He is diuresing with Lasix.  With return of A-fib discussed the course of amiodarone with the patient and his wife      Review of Systems:   All systems have been reviewed and are negative with the exception of those mentioned above.      Objective:    Vital Sign Min/Max for last 24 hours  Temp  Min: 97.7 °F (36.5 °C)  Max: 98.4 °F (36.9 °C)   BP  Min: 79/66  Max: 172/144   Pulse  Min: 44  Max: 179   Resp  Min: 16  Max: 20   SpO2  Min: 82 %  Max: 100 %   No data recorded   Weight  Min: 99.3 kg (219 lb)  Max: 132 kg (291 lb 0.1 oz)     Flowsheet Rows      Flowsheet Row First Filed Value   Admission Height 193 cm (76\") Documented at 09/01/2023 1458   Admission Weight 132 kg (290 lb) " Documented at 09/01/2023 1458            Telemetry: Atrial fibrillation with fairly controlled rate      Intake/Output Summary (Last 24 hours) at 9/2/2023 1230  Last data filed at 9/2/2023 1045  Gross per 24 hour   Intake --   Output 6600 ml   Net -6600 ml     Intake & Output (last 3 days)         08/30 0701 08/31 0700 08/31 0701 09/01 0700 09/01 0701 09/02 0700 09/02 0701 09/03 0700    Urine (mL/kg/hr)   4800 1800 (2.5)    Total Output   4800 1800    Net   -4800 -1800                     Physical Exam:  Pulmonary:      Effort: Pulmonary effort is normal.      Breath sounds: Rales present.   Cardiovascular:      Normal rate. Irregular rhythm.      Murmurs: There is no murmur.   Edema:     Pretibial: bilateral 1+ edema of the pretibial area.  Neurological:      General: No focal deficit present.        LABS/DIAGNOSTIC DATA:  Results from last 7 days   Lab Units 09/02/23  0557 09/01/23  1532   WBC 10*3/mm3 5.15 5.16   HEMOGLOBIN g/dL 13.7 14.1   HEMATOCRIT % 42.8 44.9   PLATELETS 10*3/mm3 200 203     Lab Results   Lab Value Date/Time    TROPONINT 12 09/01/2023 1532     Results from last 7 days   Lab Units 09/01/23  1532   INR  1.21*   APTT seconds 30.3*     Results from last 7 days   Lab Units 09/02/23  0557 09/01/23  1532   SODIUM mmol/L 144 143   POTASSIUM mmol/L 3.8 4.3   CHLORIDE mmol/L 105 107   CO2 mmol/L 28.0 23.0   BUN mg/dL 17 17   CREATININE mg/dL 0.85 0.89   CALCIUM mg/dL 8.7 8.9   BILIRUBIN mg/dL 0.7 0.8   ALK PHOS U/L 110 116   ALT (SGPT) U/L 41 47*   AST (SGOT) U/L 32 39   GLUCOSE mg/dL 106* 107*     Results from last 7 days   Lab Units 09/01/23  1532   HEMOGLOBIN A1C % 5.80*         Results from last 7 days   Lab Units 09/01/23  1532   TSH uIU/mL 1.650           Medication Review:   amiodarone, 150 mg, Intravenous, Once  atorvastatin, 10 mg, Oral, Daily  furosemide, 40 mg, Intravenous, Daily  metoprolol tartrate, 25 mg, Oral, Q12H  pantoprazole, 40 mg, Oral, Q AM  rivaroxaban, 20 mg, Oral, Daily  With Dinner  sertraline, 100 mg, Oral, Daily       amiodarone, 1 mg/min   Followed by  amiodarone, 0.5 mg/min           Atrial fibrillation    Paroxysmal atrial fibrillation      Assessment/Plan:  Atrial fibrillation  Attempted cardioversion today but with early return of A-fib  We will give a bolus of IV amiodarone followed by drip today  Continue metoprolol as blood pressure allows with hold parameters  Continue Xarelto for anticoagulation, [id number] 2  If the patient does not achieve sinus rhythm with IV amiodarone would plan on discharge and return in 1 to 2 weeks for outpatient cardioversion  EF appears to be 41 to 45% ARIANE  Acute heart failure with mildly reduced ejection fraction  Secondary to A-fib with RVR  Having good output with IV Lasix continue 40 mg IV daily  Postprocedural hypotension  Suspect hypotension related to sedation post procedure  Did give back 500 cc of IV saline    If blood pressure improved this afternoon can transfer out of ICU.          Roque Husain MD [facility]  09/02/23      Electronically signed by Roque Husain MD at 09/02/23 1536       Iesha Wilcox DO at 09/02/23 1016            INTENSIVIST   PROGRESS NOTE     Hospital:  LOS: 1 day     Mr. David Jean Baptiste, 62 y.o. male is followed for a Chief Complaint of: Atrial Fibrillation      Subjective   S     Interval History:  Now if rate controlled Afib. All drips are off.        The patient's relevant past medical, surgical and social history were reviewed and updated in Epic as appropriate.      ROS:   Constitutional: Negative for fever.   Respiratory: Negative for dyspnea.   Cardiovascular: Negative for chest pain.   Gastrointestinal: Negative for  nausea, vomiting and diarrhea.     Objective   O     Vitals:  Temp  Min: 97.7 °F (36.5 °C)  Max: 98.4 °F (36.9 °C)  BP  Min: 79/66  Max: 172/144  Pulse  Min: 44  Max: 179  Resp  Min: 16  Max: 20  SpO2  Min: 82 %  Max: 100 % Flow (L/min)  Min: 2  Max: 2    Intake/Ouptut  24 hrs (7:00AM - 6:59 AM)  Intake & Output (last 3 days)         08/30 0701 08/31 0700 08/31 0701 09/01 0700 09/01 0701 09/02 0700 09/02 0701 09/03 0700    Urine (mL/kg/hr)   4800 1200 (2.8)    Total Output   4800 1200    Net   -4800 -1200                      Physical Examination  Telemetry:  Atrial fibrillation.   Constitutional:  No acute distress.  Sitting up in bed.    Eyes: No scleral icterus.   PERRL, EOM intact.    Neck:  Supple, FROM   Cardiovascular: Irregularly irregular. Normal heart sounds.  No murmurs, gallop or rub.   Respiratory: No respiratory distress. Normal respiratory effort.  Normal breath sounds  Clear to auscultation   Abdominal:  Soft. No masses. Nontender. No distension. No HSM.   Extremities: No digital cyanosis. No clubbing.  1+ peripheral edema.   Skin: No rashes, lesions or ulcers   Neurological:   Alert and Oriented to person, place, and time.             Results from last 7 days   Lab Units 09/02/23  0557 09/01/23  1532   WBC 10*3/mm3 5.15 5.16   HEMOGLOBIN g/dL 13.7 14.1   MCV fL 92.6 93.5   PLATELETS 10*3/mm3 200 203     Results from last 7 days   Lab Units 09/02/23  0557 09/01/23  1532   SODIUM mmol/L 144 143   POTASSIUM mmol/L 3.8 4.3   CO2 mmol/L 28.0 23.0   CREATININE mg/dL 0.85 0.89   GLUCOSE mg/dL 106* 107*   MAGNESIUM mg/dL 2.0 2.0   PHOSPHORUS mg/dL 3.3  --      Estimated Creatinine Clearance: 133.8 mL/min (by C-G formula based on SCr of 0.85 mg/dL).  Results from last 7 days   Lab Units 09/02/23  0557 09/01/23  1532   ALK PHOS U/L 110 116   BILIRUBIN mg/dL 0.7 0.8   ALT (SGPT) U/L 41 47*   AST (SGOT) U/L 32 39             Images:  Imaging Results (Last 24 Hours)       Procedure Component Value Units Date/Time    XR Chest 1 View [[id number]] Collected: 09/02/23 0848     Updated: 09/02/23 0854    Narrative:      XR CHEST 1 VW    Date of Exam: 9/2/2023 3:45 AM EDT    Indication: A Fib, heart failure    Comparison: 9/1/2023 and prior    Findings:  Study is limited by  overlying support and monitoring apparatus. Heart size is enlarged. This is more prominent than the comparison likely related to lower lung volumes. Pulm vascularity demonstrates mild pulmonary vascular congestion. Increased hazy and   interstitial opacities noted dependently are accentuated by lower lung volumes. There is mild blunting at the right costophrenic angle and minimal focal dependent opacity. Valuation of the retrocardiac aspect of the left base is limited. Osseous   structures are stable.        Impression:      Impression:    1. Cardiomegaly and mild pulmonary vascular congestion with vascular crowding at the lung bases. Findings are accentuated by low lung volumes.    2. Dependent opacities which represent atelectasis and/or mild degree of pulmonary edema and small bilateral pleural effusions noted.    2. Findings appear more prominent in the comparison although this is likely in part due to differences in technique      Electronically Signed: Jaswant Cisneros MD    9/2/2023 8:51 AM EDT    Workstation ID: [id number]               Results: Reviewed.  I reviewed the patient's new laboratory and imaging results.  I independently reviewed the patient's new images.    Medications: Reviewed.    Assessment & Plan   A / P     Mr. Jean Baptiste is a 61yo M with a history of arthritis, depression and HLD who presented to the [facility] ED on 9/1/23 with shortness of breath. He was found to be in Afib with RVR. He was started on a diltiazem drip and received IV Lasix in the ED. He was also started on IV Labetalol drip for hypertension.     He was evaluated by Cardiology who gave IV Digoxin and started the patient on PO Metoprolol. Heparin drip was initially started but changed to Xarelto.     Nutrition:   NPO Diet NPO Type: Strict NPO  Advance Directives:   Code Status and Medical Interventions:   Ordered at: 09/01/23 5609     Code Status (Patient has no pulse and is not breathing):    CPR (Attempt to Resuscitate)     Medical  Interventions (Patient has pulse or is breathing):    Full Support       Active Hospital Problems    Diagnosis     **Atrial fibrillation     Paroxysmal atrial fibrillation        Assessment / Plan:    Cardiology following and planning for ARIANE with Cardioversion.   Continue Xarelto  IV Lasix again this AM.   PO Metoprolol.   NPO. Cardiac diet after cardioversion.  AM labs    High level of risk due to:  severe exacerbation of chronic illness and illness with threat to life or bodily function.    Plan of care and goals reviewed during interdisciplinary rounds.  I discussed the patient's findings and my recommendations with patient and nursing staff      Iesha Wilcox DO    Intensive Care Medicine and Pulmonary Medicine       Electronically signed by Iesha Wilcox DO at 09/02/23 1022       Consult Notes (last 7 days)  Notes from 08/29/23 through 09/05/23   No notes of this type exist for this encounter.

## 2023-09-06 ENCOUNTER — READMISSION MANAGEMENT (OUTPATIENT)
Dept: CALL CENTER | Facility: HOSPITAL | Age: 63
End: 2023-09-06
Payer: COMMERCIAL

## 2023-09-06 NOTE — OUTREACH NOTE
CHF Week 1 Survey      Flowsheet Row Responses   [facility] facility patient discharged from? [facility]   Does the patient have one of the following disease processes/diagnoses(primary or secondary)? CHF   CHF Week 1 attempt successful? No   Unsuccessful attempts Attempt 1            NIXON GARRETT - Registered Nurse

## 2023-09-08 ENCOUNTER — PREP FOR SURGERY (OUTPATIENT)
Dept: OTHER | Facility: HOSPITAL | Age: 63
End: 2023-09-08
Payer: COMMERCIAL

## 2023-09-08 DIAGNOSIS — I48.91 ATRIAL FIBRILLATION WITH RAPID VENTRICULAR RESPONSE: Primary | ICD-10-CM

## 2023-09-08 RX ORDER — SODIUM CHLORIDE 0.9 % (FLUSH) 0.9 %
10 SYRINGE (ML) INJECTION AS NEEDED
OUTPATIENT
Start: 2023-09-08

## 2023-09-08 RX ORDER — SODIUM CHLORIDE 0.9 % (FLUSH) 0.9 %
10 SYRINGE (ML) INJECTION EVERY 12 HOURS SCHEDULED
OUTPATIENT
Start: 2023-09-08

## 2023-09-08 RX ORDER — SODIUM CHLORIDE 9 MG/ML
40 INJECTION, SOLUTION INTRAVENOUS AS NEEDED
OUTPATIENT
Start: 2023-09-08

## 2023-09-13 ENCOUNTER — OFFICE VISIT (OUTPATIENT)
Dept: CARDIOLOGY | Facility: HOSPITAL | Age: 63
End: 2023-09-13
Payer: COMMERCIAL

## 2023-09-13 ENCOUNTER — READMISSION MANAGEMENT (OUTPATIENT)
Dept: CALL CENTER | Facility: HOSPITAL | Age: 63
End: 2023-09-13
Payer: COMMERCIAL

## 2023-09-13 VITALS
HEART RATE: 83 BPM | RESPIRATION RATE: 20 BRPM | OXYGEN SATURATION: 99 % | TEMPERATURE: 97.9 F | DIASTOLIC BLOOD PRESSURE: 91 MMHG | SYSTOLIC BLOOD PRESSURE: 117 MMHG | HEIGHT: 76 IN | BODY MASS INDEX: 25.71 KG/M2 | WEIGHT: 211.13 LBS

## 2023-09-13 DIAGNOSIS — I50.22 CHRONIC SYSTOLIC HEART FAILURE: Primary | ICD-10-CM

## 2023-09-13 DIAGNOSIS — I48.0 PAROXYSMAL ATRIAL FIBRILLATION: ICD-10-CM

## 2023-09-13 NOTE — H&P (VIEW-ONLY)
"Chief Complaint  Atrial Fibrillation and Establish Care    Subjective    History of Present Illness {CC  Problem List  Visit  Diagnosis   Encounters  Notes  Medications  Labs  Result Review Imaging  Media :23}       History of Present Illness   62-year-old male patient presented to [facility] ED on 9/1/2023 with complaints of orthopnea and dyspnea.  He was noted to be in A-fib with RVR.  He underwent a ARIANE and a cardioversion on 9/2/2023 but quickly returned to atrial fibrillation.  He was initiated on amiodarone drip and then transition to p.o. with plans for cardioversion on 9/22/2023.  Does not check his heart rate at home.  Metoprolol tartrate for rate control and is anticoagulated with Xarelto.  Patient currently denies any signs and symptoms of bleeding however he notes he does bruise easily.Echo during hospital stay showed an EF of 41 to 45% and  he was initiated on Lasix daily.  Echo showed trace to mild aortic valve regurgitation and mild MR.  Notes dyspnea on exertion when he overdoes it  Objective     Vital Signs:   Vitals:    09/13/23 1033 09/13/23 1036 09/13/23 1037   BP: 129/89 121/89 117/91   BP Location: Right arm Left arm Left arm   Patient Position: Sitting Standing Sitting   Cuff Size: Adult Adult Adult   Pulse: 78 78 83   Resp:   20   Temp:   97.9 °F (36.6 °C)   TempSrc:   Temporal   SpO2: 99% 99% 99%   Weight:   95.8 kg (211 lb 2 oz)   Height:   193 cm (75.98\")     Body mass index is 25.71 kg/m².  Physical Exam  Vitals and nursing note reviewed.   Constitutional:       Appearance: Normal appearance.   HENT:      Head: Normocephalic.   Eyes:      Pupils: Pupils are equal, round, and reactive to light.   Cardiovascular:      Rate and Rhythm: Normal rate and regular rhythm. Rhythm irregular.      Pulses: Normal pulses.      Heart sounds: Normal heart sounds. No murmur heard.  Pulmonary:      Effort: Pulmonary effort is normal.      Breath sounds: Normal breath sounds. "   Abdominal:      General: Bowel sounds are normal.      Palpations: Abdomen is soft.   Musculoskeletal:         General: Normal range of motion.      Cervical back: Normal range of motion.      Right lower leg: No edema.      Left lower leg: No edema.   Skin:     General: Skin is warm and dry.      Capillary Refill: Capillary refill takes less than 2 seconds.   Neurological:      Mental Status: He is alert and oriented to person, place, and time.   Psychiatric:         Mood and Affect: Mood normal.         Thought Content: Thought content normal.            Result Review  Data Reviewed:{ Labs  Result Review  Imaging  Med Tab  Media :23}   Adult Transesophageal Echo (ARIANE) W/ Cont if Necessary Per Protocol (09/02/2023 11:42)  ECG 12 Lead Drug Monitoring; Amiodarone (09/02/2023 13:03)  ECG 12 Lead Drug Monitoring; Amiodarone (09/04/2023 05:40)             Assessment and Plan {CC Problem List  Visit Diagnosis  ROS  Review (Popup)  Health Maintenance  Quality  BestPractice  Medications  SmartSets  SnapShot Encounters  Media :23}   1. Chronic systolic heart failure  Nyha I  Heart failure education today including signs and symptoms, the role of the heart failure center, daily weights, low sodium diet (less than 1500 mg per day), and daily physical activity. Reviewed HF Zones with patient and family.  Patient to continue current medications as previously ordered.     2. Paroxysmal atrial fibrillation    Continue amiodarone, xarelto, lopressor  CHADS-VASc Risk Assessment              0 Total Score        Criteria that do not apply:    CHF    Hypertension    Age >/= 75    DM    PRIOR STROKE/TIA/THROMBO    Vascular Disease    Age 65-74    Sex: Female          ECV 9/22/23        Follow Up {Instructions Charge Capture  Follow-up Communications :23}   Return if symptoms worsen or fail to improve.    Patient was given instructions and counseling regarding his condition or for health maintenance advice. Please  see specific information pulled into the AVS if appropriate.  Patient was instructed to call the [facility] with any questions, concerns, or worsening symptoms.

## 2023-09-13 NOTE — PROGRESS NOTES
"Chief Complaint  Atrial Fibrillation and Establish Care    Subjective    History of Present Illness {CC  Problem List  Visit  Diagnosis   Encounters  Notes  Medications  Labs  Result Review Imaging  Media :23}       History of Present Illness   62-year-old male patient presented to [facility] ED on 9/1/2023 with complaints of orthopnea and dyspnea.  He was noted to be in A-fib with RVR.  He underwent a ARIANE and a cardioversion on 9/2/2023 but quickly returned to atrial fibrillation.  He was initiated on amiodarone drip and then transition to p.o. with plans for cardioversion on 9/22/2023.  Does not check his heart rate at home.  Metoprolol tartrate for rate control and is anticoagulated with Xarelto.  Patient currently denies any signs and symptoms of bleeding however he notes he does bruise easily.Echo during hospital stay showed an EF of 41 to 45% and  he was initiated on Lasix daily.  Echo showed trace to mild aortic valve regurgitation and mild MR.  Notes dyspnea on exertion when he overdoes it  Objective     Vital Signs:   Vitals:    09/13/23 1033 09/13/23 1036 09/13/23 1037   BP: 129/89 121/89 117/91   BP Location: Right arm Left arm Left arm   Patient Position: Sitting Standing Sitting   Cuff Size: Adult Adult Adult   Pulse: 78 78 83   Resp:   20   Temp:   97.9 °F (36.6 °C)   TempSrc:   Temporal   SpO2: 99% 99% 99%   Weight:   95.8 kg (211 lb 2 oz)   Height:   193 cm (75.98\")     Body mass index is 25.71 kg/m².  Physical Exam  Vitals and nursing note reviewed.   Constitutional:       Appearance: Normal appearance.   HENT:      Head: Normocephalic.   Eyes:      Pupils: Pupils are equal, round, and reactive to light.   Cardiovascular:      Rate and Rhythm: Normal rate and regular rhythm. Rhythm irregular.      Pulses: Normal pulses.      Heart sounds: Normal heart sounds. No murmur heard.  Pulmonary:      Effort: Pulmonary effort is normal.      Breath sounds: Normal breath sounds. "   Abdominal:      General: Bowel sounds are normal.      Palpations: Abdomen is soft.   Musculoskeletal:         General: Normal range of motion.      Cervical back: Normal range of motion.      Right lower leg: No edema.      Left lower leg: No edema.   Skin:     General: Skin is warm and dry.      Capillary Refill: Capillary refill takes less than 2 seconds.   Neurological:      Mental Status: He is alert and oriented to person, place, and time.   Psychiatric:         Mood and Affect: Mood normal.         Thought Content: Thought content normal.            Result Review  Data Reviewed:{ Labs  Result Review  Imaging  Med Tab  Media :23}   Adult Transesophageal Echo (ARIANE) W/ Cont if Necessary Per Protocol (09/02/2023 11:42)  ECG 12 Lead Drug Monitoring; Amiodarone (09/02/2023 13:03)  ECG 12 Lead Drug Monitoring; Amiodarone (09/04/2023 05:40)             Assessment and Plan {CC Problem List  Visit Diagnosis  ROS  Review (Popup)  Health Maintenance  Quality  BestPractice  Medications  SmartSets  SnapShot Encounters  Media :23}   1. Chronic systolic heart failure  Nyha I  Heart failure education today including signs and symptoms, the role of the heart failure center, daily weights, low sodium diet (less than 1500 mg per day), and daily physical activity. Reviewed HF Zones with patient and family.  Patient to continue current medications as previously ordered.     2. Paroxysmal atrial fibrillation    Continue amiodarone, xarelto, lopressor  CHADS-VASc Risk Assessment              0 Total Score        Criteria that do not apply:    CHF    Hypertension    Age >/= 75    DM    PRIOR STROKE/TIA/THROMBO    Vascular Disease    Age 65-74    Sex: Female          ECV 9/22/23        Follow Up {Instructions Charge Capture  Follow-up Communications :23}   Return if symptoms worsen or fail to improve.    Patient was given instructions and counseling regarding his condition or for health maintenance advice. Please  see specific information pulled into the AVS if appropriate.  Patient was instructed to call the [facility] with any questions, concerns, or worsening symptoms.

## 2023-09-13 NOTE — OUTREACH NOTE
CHF Week 2 Survey      Flowsheet Row Responses   List of hospitals in [place] patient discharged from? [facility]   Does the patient have one of the following disease processes/diagnoses(primary or secondary)? CHF   Week 2 attempt successful? Yes   Call start time 1838   Call end time 1841   Discharge diagnosis **Atrial fibrillationChronic systolic heart failure   Meds reviewed with patient/caregiver? Yes   Is the patient having any side effects they believe may be caused by any medication additions or changes? No   Does the patient have all medications ordered at discharge? Yes   Is the patient taking all medications as directed (includes completed medication regime)? Yes   Does the patient have a primary care provider?  Yes   Has the patient kept scheduled appointments due by today? Yes   Psychosocial issues? No   Did the patient receive a copy of their discharge instructions? Yes   Nursing interventions Reviewed instructions with patient   What is the patient's perception of their health status since discharge? Improving   Nursing interventions Nurse provided patient education   Is the patient able to teach back signs and symptoms of worsening condition? (i.e. weight gain, shortness of air, etc.) Yes   If the patient is a current smoker, are they able to teach back resources for cessation? Not a smoker   Is the patient/caregiver able to teach back the hierarchy of who to call/visit for symptoms/problems? PCP, Specialist, Home health nurse, Urgent Care, ED, 911 Yes   CHF Zone this Call Green Zone   Green Zone No chest pain, Weight check stable, No new swelling -  feet, ankles and legs look normal for you, Physical activity level is normal for you, No new or worsening shortness of breath, Patient reports doing well   Green Zone Interventions Daily weight check, Low sodium diet, Meds as directed, Follow up visits planned   CHF Week 2 call completed? Yes   Graduated/Revoked comments State he is doing well.  Has seen his PCP and  [facility] [facility].  Is scheduled for a cardioversion 1 week from Friday.   Call end time 1841            Marium DEGROOT - Licensed Nurse

## 2023-09-22 ENCOUNTER — HOSPITAL ENCOUNTER (OUTPATIENT)
Dept: CARDIOLOGY | Facility: HOSPITAL | Age: 63
Discharge: HOME | End: 2023-09-22
Attending: INTERNAL MEDICINE | Admitting: INTERNAL MEDICINE
Payer: COMMERCIAL

## 2023-09-22 VITALS
RESPIRATION RATE: 18 BRPM | DIASTOLIC BLOOD PRESSURE: 79 MMHG | SYSTOLIC BLOOD PRESSURE: 113 MMHG | HEIGHT: 76 IN | HEART RATE: 47 BPM | WEIGHT: 213.8 LBS | TEMPERATURE: 97.2 F | BODY MASS INDEX: 26.04 KG/M2 | OXYGEN SATURATION: 100 %

## 2023-09-22 DIAGNOSIS — I48.91 ATRIAL FIBRILLATION WITH RAPID VENTRICULAR RESPONSE: ICD-10-CM

## 2023-09-22 LAB
ANION GAP SERPL CALCULATED.3IONS-SCNC: 7 MMOL/L (ref 5–15)
BUN SERPL-MCNC: 15 MG/DL (ref 8–23)
BUN/CREAT SERPL: 16.3 (ref 7–25)
CALCIUM SPEC-SCNC: 8.8 MG/DL (ref 8.6–10.5)
CHLORIDE SERPL-SCNC: 104 MMOL/L (ref 98–107)
CO2 SERPL-SCNC: 29 MMOL/L (ref 22–29)
CREAT SERPL-MCNC: 0.92 MG/DL (ref 0.76–1.27)
DEPRECATED RDW RBC AUTO: 41.9 FL (ref 37–54)
EGFRCR SERPLBLD CKD-EPI 2021: 94.1 ML/MIN/1.73
ERYTHROCYTE [DISTWIDTH] IN BLOOD BY AUTOMATED COUNT: 12.2 % (ref 12.3–15.4)
GLUCOSE SERPL-MCNC: 93 MG/DL (ref 65–99)
HCT VFR BLD AUTO: 45.1 % (ref 37.5–51)
HGB BLD-MCNC: 14.2 G/DL (ref 13–17.7)
MCH RBC QN AUTO: 29.3 PG (ref 26.6–33)
MCHC RBC AUTO-ENTMCNC: 31.5 G/DL (ref 31.5–35.7)
MCV RBC AUTO: 93 FL (ref 79–97)
PLATELET # BLD AUTO: 197 10*3/MM3 (ref 140–450)
PMV BLD AUTO: 10.4 FL (ref 6–12)
POTASSIUM SERPL-SCNC: 4.6 MMOL/L (ref 3.5–5.2)
QT INTERVAL: 440 MS
QTC INTERVAL: 424 MS
RBC # BLD AUTO: 4.85 10*6/MM3 (ref 4.14–5.8)
SODIUM SERPL-SCNC: 140 MMOL/L (ref 136–145)
WBC NRBC COR # BLD: 4.68 10*3/MM3 (ref 3.4–10.8)

## 2023-09-22 PROCEDURE — 92960 CARDIOVERSION ELECTRIC EXT: CPT

## 2023-09-22 PROCEDURE — 92960 CARDIOVERSION ELECTRIC EXT: CPT | Performed by: INTERNAL MEDICINE

## 2023-09-22 PROCEDURE — 36415 COLL VENOUS BLD VENIPUNCTURE: CPT

## 2023-09-22 PROCEDURE — 80048 BASIC METABOLIC PNL TOTAL CA: CPT | Performed by: HOSPITALIST

## 2023-09-22 PROCEDURE — 93005 ELECTROCARDIOGRAM TRACING: CPT | Performed by: INTERNAL MEDICINE

## 2023-09-22 PROCEDURE — 93010 ELECTROCARDIOGRAM REPORT: CPT | Performed by: INTERNAL MEDICINE

## 2023-09-22 PROCEDURE — 25010000002 MIDAZOLAM PER 1 MG: Performed by: INTERNAL MEDICINE

## 2023-09-22 PROCEDURE — 85027 COMPLETE CBC AUTOMATED: CPT | Performed by: HOSPITALIST

## 2023-09-22 RX ORDER — SODIUM CHLORIDE 0.9 % (FLUSH) 0.9 %
10 SYRINGE (ML) INJECTION EVERY 12 HOURS SCHEDULED
Status: DISCONTINUED | OUTPATIENT
Start: 2023-09-22 | End: 2023-09-22 | Stop reason: HOSPADM

## 2023-09-22 RX ORDER — MIDAZOLAM HYDROCHLORIDE 1 MG/ML
INJECTION INTRAMUSCULAR; INTRAVENOUS
Status: COMPLETED | OUTPATIENT
Start: 2023-09-22 | End: 2023-09-22

## 2023-09-22 RX ORDER — SODIUM CHLORIDE 0.9 % (FLUSH) 0.9 %
10 SYRINGE (ML) INJECTION AS NEEDED
Status: DISCONTINUED | OUTPATIENT
Start: 2023-09-22 | End: 2023-09-22 | Stop reason: HOSPADM

## 2023-09-22 RX ORDER — MIDAZOLAM HYDROCHLORIDE 1 MG/ML
2-8 INJECTION INTRAMUSCULAR; INTRAVENOUS ONCE AS NEEDED
Status: DISCONTINUED | OUTPATIENT
Start: 2023-09-22 | End: 2023-09-22 | Stop reason: HOSPADM

## 2023-09-22 RX ORDER — FLUMAZENIL 0.1 MG/ML
0.5 INJECTION INTRAVENOUS ONCE AS NEEDED
Status: DISCONTINUED | OUTPATIENT
Start: 2023-09-22 | End: 2023-09-22 | Stop reason: HOSPADM

## 2023-09-22 RX ORDER — LISINOPRIL 5 MG/1
5 TABLET ORAL DAILY
Qty: 30 TABLET | Refills: 11 | Status: SHIPPED | OUTPATIENT
Start: 2023-09-22

## 2023-09-22 RX ORDER — NALOXONE HCL 0.4 MG/ML
0.4 VIAL (ML) INJECTION ONCE AS NEEDED
Status: DISCONTINUED | OUTPATIENT
Start: 2023-09-22 | End: 2023-09-22 | Stop reason: HOSPADM

## 2023-09-22 RX ORDER — FENTANYL CITRATE 50 UG/ML
50-100 INJECTION, SOLUTION INTRAMUSCULAR; INTRAVENOUS ONCE AS NEEDED
Status: DISCONTINUED | OUTPATIENT
Start: 2023-09-22 | End: 2023-09-22 | Stop reason: HOSPADM

## 2023-09-22 RX ORDER — AMIODARONE HYDROCHLORIDE 200 MG/1
200 TABLET ORAL DAILY
Qty: 30 TABLET | Refills: 5 | Status: SHIPPED | OUTPATIENT
Start: 2023-09-22

## 2023-09-22 RX ORDER — SODIUM CHLORIDE 9 MG/ML
40 INJECTION, SOLUTION INTRAVENOUS AS NEEDED
Status: DISCONTINUED | OUTPATIENT
Start: 2023-09-22 | End: 2023-09-22 | Stop reason: HOSPADM

## 2023-09-22 RX ADMIN — MIDAZOLAM HYDROCHLORIDE 2 MG: 1 INJECTION, SOLUTION INTRAMUSCULAR; INTRAVENOUS at 08:51

## 2023-09-22 NOTE — INTERVAL H&P NOTE
H&P reviewed. The patient was examined and there are no changes to the H&P.      Vitals:    09/22/23 0725   BP: 119/90   Pulse: 56   Temp:    SpO2: 99%       Lab Results   Component Value Date    WBC 4.68 09/22/2023    HGB 14.2 09/22/2023    HCT 45.1 09/22/2023    MCV 93.0 09/22/2023     09/22/2023     Lab Results   Component Value Date    GLUCOSE 101 (H) 09/04/2023    CALCIUM 9.0 09/04/2023     09/04/2023    K 4.1 09/04/2023    CO2 29.0 09/04/2023     09/04/2023    BUN 19 09/04/2023    CREATININE 0.93 09/04/2023    EGFR 92.8 09/04/2023    BCR 20.4 09/04/2023    ANIONGAP 10.0 09/04/2023     Tele: Irregularly irregular, 52 bpm    Assessment:  David Jean Baptiste presents today for an ECV due to recent onset of symptomatic atrial fibrillation. He has remained n.p.o. since 7 PM last night. He has not missed any doses of Xarelto since it was first initiated on 9/1/2023.    Plan:  Procedure: ECV. Procedure, risks, complications and benefits discussed with patient and he wishes to proceed.   Further recommendations to follow.      Electronically signed by Delmi Mccloud PA-C, 09/22/23, 8:14 AM EDT.

## 2023-09-22 NOTE — PROCEDURES
"Diagnosis:  Atrial fibrillation     PROCEDURE PERFORMED: External electrical cardioversion.    Anesthesia: Sedation with:  1. 2 mg Versed  2. 40 mg Brevital    Estimated Blood Loss: Less than 10 mL     Specimens: None    Physician moderate sedation attestation:  \"I was present with the patient for the duration of moderate sedation and supervised staff who had no other duties and monitored the patient for the entire procedure     I supervised and directed an independent trained observer with the assistance of monitoring the patient's level of consciousness and physiological status throughout the procedure.  Intraoperative service time of 10 minutes    PROCEDURE IN DETAIL: The patient was brought into the CVOU in a fasting  state. The patient was given moderate sedation during which he received 200  joules of external electrical cardioversion that converted atrial  fibrillation to normal sinus heart rhythm.  Patient has been continuously anticoagulated with Xarelto    IMPRESSION: Successful conversion of atrial fibrillation to normal sinus  heart rhythm.  "

## 2023-10-10 ENCOUNTER — HOSPITAL ENCOUNTER (EMERGENCY)
Facility: HOSPITAL | Age: 63
Discharge: HOME | End: 2023-10-10
Attending: EMERGENCY MEDICINE | Admitting: EMERGENCY MEDICINE
Payer: COMMERCIAL

## 2023-10-10 ENCOUNTER — APPOINTMENT (OUTPATIENT)
Dept: GENERAL RADIOLOGY | Facility: HOSPITAL | Age: 63
End: 2023-10-10
Payer: COMMERCIAL

## 2023-10-10 ENCOUNTER — APPOINTMENT (OUTPATIENT)
Dept: MRI IMAGING | Facility: HOSPITAL | Age: 63
End: 2023-10-10
Payer: COMMERCIAL

## 2023-10-10 VITALS
RESPIRATION RATE: 16 BRPM | WEIGHT: 213 LBS | SYSTOLIC BLOOD PRESSURE: 130 MMHG | BODY MASS INDEX: 25.94 KG/M2 | HEIGHT: 76 IN | DIASTOLIC BLOOD PRESSURE: 94 MMHG | HEART RATE: 112 BPM | OXYGEN SATURATION: 96 % | TEMPERATURE: 98.2 F

## 2023-10-10 DIAGNOSIS — I48.0 PAROXYSMAL ATRIAL FIBRILLATION: Primary | ICD-10-CM

## 2023-10-10 DIAGNOSIS — R53.1 GENERALIZED WEAKNESS: ICD-10-CM

## 2023-10-10 DIAGNOSIS — I48.91 ATRIAL FIBRILLATION, UNSPECIFIED TYPE: Primary | ICD-10-CM

## 2023-10-10 LAB
ALBUMIN SERPL-MCNC: 4.5 G/DL (ref 3.5–5.2)
ALBUMIN/GLOB SERPL: 1.6 G/DL
ALP SERPL-CCNC: 104 U/L (ref 39–117)
ALT SERPL W P-5'-P-CCNC: 31 U/L (ref 1–41)
ANION GAP SERPL CALCULATED.3IONS-SCNC: 11 MMOL/L (ref 5–15)
AST SERPL-CCNC: 30 U/L (ref 1–40)
B PARAPERT DNA SPEC QL NAA+PROBE: NOT DETECTED
B PERT DNA SPEC QL NAA+PROBE: NOT DETECTED
BASOPHILS # BLD AUTO: 0.04 10*3/MM3 (ref 0–0.2)
BASOPHILS NFR BLD AUTO: 0.7 % (ref 0–1.5)
BILIRUB SERPL-MCNC: 0.6 MG/DL (ref 0–1.2)
BILIRUB UR QL STRIP: NEGATIVE
BUN SERPL-MCNC: 12 MG/DL (ref 8–23)
BUN/CREAT SERPL: 10.9 (ref 7–25)
C PNEUM DNA NPH QL NAA+NON-PROBE: NOT DETECTED
CALCIUM SPEC-SCNC: 9.6 MG/DL (ref 8.6–10.5)
CHLORIDE SERPL-SCNC: 103 MMOL/L (ref 98–107)
CLARITY UR: CLEAR
CO2 SERPL-SCNC: 27 MMOL/L (ref 22–29)
COLOR UR: YELLOW
CREAT SERPL-MCNC: 1.1 MG/DL (ref 0.76–1.27)
DEPRECATED RDW RBC AUTO: 40.3 FL (ref 37–54)
EGFRCR SERPLBLD CKD-EPI 2021: 75.4 ML/MIN/1.73
EOSINOPHIL # BLD AUTO: 0.06 10*3/MM3 (ref 0–0.4)
EOSINOPHIL NFR BLD AUTO: 1.1 % (ref 0.3–6.2)
ERYTHROCYTE [DISTWIDTH] IN BLOOD BY AUTOMATED COUNT: 12.4 % (ref 12.3–15.4)
FLUAV SUBTYP SPEC NAA+PROBE: NOT DETECTED
FLUBV RNA ISLT QL NAA+PROBE: NOT DETECTED
GLOBULIN UR ELPH-MCNC: 2.9 GM/DL
GLUCOSE SERPL-MCNC: 133 MG/DL (ref 65–99)
GLUCOSE UR STRIP-MCNC: NEGATIVE MG/DL
HADV DNA SPEC NAA+PROBE: NOT DETECTED
HCOV 229E RNA SPEC QL NAA+PROBE: NOT DETECTED
HCOV HKU1 RNA SPEC QL NAA+PROBE: NOT DETECTED
HCOV NL63 RNA SPEC QL NAA+PROBE: NOT DETECTED
HCOV OC43 RNA SPEC QL NAA+PROBE: NOT DETECTED
HCT VFR BLD AUTO: 48.4 % (ref 37.5–51)
HGB BLD-MCNC: 15.9 G/DL (ref 13–17.7)
HGB UR QL STRIP.AUTO: NEGATIVE
HMPV RNA NPH QL NAA+NON-PROBE: NOT DETECTED
HOLD SPECIMEN: NORMAL
HPIV1 RNA ISLT QL NAA+PROBE: NOT DETECTED
HPIV2 RNA SPEC QL NAA+PROBE: NOT DETECTED
HPIV3 RNA NPH QL NAA+PROBE: NOT DETECTED
HPIV4 P GENE NPH QL NAA+PROBE: NOT DETECTED
IMM GRANULOCYTES # BLD AUTO: 0.02 10*3/MM3 (ref 0–0.05)
IMM GRANULOCYTES NFR BLD AUTO: 0.4 % (ref 0–0.5)
KETONES UR QL STRIP: NEGATIVE
LEUKOCYTE ESTERASE UR QL STRIP.AUTO: NEGATIVE
LYMPHOCYTES # BLD AUTO: 1.28 10*3/MM3 (ref 0.7–3.1)
LYMPHOCYTES NFR BLD AUTO: 23.9 % (ref 19.6–45.3)
M PNEUMO IGG SER IA-ACNC: NOT DETECTED
MAGNESIUM SERPL-MCNC: 2.1 MG/DL (ref 1.6–2.4)
MCH RBC QN AUTO: 29.7 PG (ref 26.6–33)
MCHC RBC AUTO-ENTMCNC: 32.9 G/DL (ref 31.5–35.7)
MCV RBC AUTO: 90.3 FL (ref 79–97)
MONOCYTES # BLD AUTO: 0.42 10*3/MM3 (ref 0.1–0.9)
MONOCYTES NFR BLD AUTO: 7.8 % (ref 5–12)
NEUTROPHILS NFR BLD AUTO: 3.54 10*3/MM3 (ref 1.7–7)
NEUTROPHILS NFR BLD AUTO: 66.1 % (ref 42.7–76)
NITRITE UR QL STRIP: NEGATIVE
NRBC BLD AUTO-RTO: 0 /100 WBC (ref 0–0.2)
NT-PROBNP SERPL-MCNC: 302.7 PG/ML (ref 0–900)
PH UR STRIP.AUTO: 7.5 [PH] (ref 5–8)
PLATELET # BLD AUTO: 199 10*3/MM3 (ref 140–450)
PMV BLD AUTO: 9.8 FL (ref 6–12)
POTASSIUM SERPL-SCNC: 3.7 MMOL/L (ref 3.5–5.2)
PROT SERPL-MCNC: 7.4 G/DL (ref 6–8.5)
PROT UR QL STRIP: NEGATIVE
QT INTERVAL: 336 MS
QTC INTERVAL: 456 MS
RBC # BLD AUTO: 5.36 10*6/MM3 (ref 4.14–5.8)
RHINOVIRUS RNA SPEC NAA+PROBE: NOT DETECTED
RSV RNA NPH QL NAA+NON-PROBE: NOT DETECTED
SARS-COV-2 RNA NPH QL NAA+NON-PROBE: NOT DETECTED
SODIUM SERPL-SCNC: 141 MMOL/L (ref 136–145)
SP GR UR STRIP: 1.01 (ref 1–1.03)
TROPONIN T SERPL HS-MCNC: 7 NG/L
TSH SERPL DL<=0.05 MIU/L-ACNC: 4.16 UIU/ML (ref 0.27–4.2)
UROBILINOGEN UR QL STRIP: NORMAL
WBC NRBC COR # BLD: 5.36 10*3/MM3 (ref 3.4–10.8)
WHOLE BLOOD HOLD COAG: NORMAL
WHOLE BLOOD HOLD SPECIMEN: NORMAL

## 2023-10-10 PROCEDURE — 0 GADOBENATE DIMEGLUMINE 529 MG/ML SOLUTION: Performed by: EMERGENCY MEDICINE

## 2023-10-10 PROCEDURE — 70553 MRI BRAIN STEM W/O & W/DYE: CPT

## 2023-10-10 PROCEDURE — 83735 ASSAY OF MAGNESIUM: CPT | Performed by: NURSE PRACTITIONER

## 2023-10-10 PROCEDURE — 71045 X-RAY EXAM CHEST 1 VIEW: CPT

## 2023-10-10 PROCEDURE — 93005 ELECTROCARDIOGRAM TRACING: CPT | Performed by: EMERGENCY MEDICINE

## 2023-10-10 PROCEDURE — 83880 ASSAY OF NATRIURETIC PEPTIDE: CPT | Performed by: EMERGENCY MEDICINE

## 2023-10-10 PROCEDURE — 0202U NFCT DS 22 TRGT SARS-COV-2: CPT | Performed by: NURSE PRACTITIONER

## 2023-10-10 PROCEDURE — 99285 EMERGENCY DEPT VISIT HI MDM: CPT

## 2023-10-10 PROCEDURE — 84484 ASSAY OF TROPONIN QUANT: CPT | Performed by: EMERGENCY MEDICINE

## 2023-10-10 PROCEDURE — 93005 ELECTROCARDIOGRAM TRACING: CPT

## 2023-10-10 PROCEDURE — A9577 INJ MULTIHANCE: HCPCS | Performed by: EMERGENCY MEDICINE

## 2023-10-10 PROCEDURE — 81003 URINALYSIS AUTO W/O SCOPE: CPT | Performed by: NURSE PRACTITIONER

## 2023-10-10 PROCEDURE — 80050 GENERAL HEALTH PANEL: CPT | Performed by: EMERGENCY MEDICINE

## 2023-10-10 RX ORDER — SODIUM CHLORIDE 0.9 % (FLUSH) 0.9 %
10 SYRINGE (ML) INJECTION AS NEEDED
Status: DISCONTINUED | OUTPATIENT
Start: 2023-10-10 | End: 2023-10-10 | Stop reason: HOSPADM

## 2023-10-10 RX ADMIN — GADOBENATE DIMEGLUMINE 20 ML: 529 INJECTION, SOLUTION INTRAVENOUS at 08:50

## 2023-10-10 NOTE — ED PROVIDER NOTES
Subjective   History of Present Illness  Patient presents to the ER for 2 days worth of feeling lightheaded along with difficulty breathing.  He does not have any chest pain.  He has a recent diagnosis of A-fib and congestive heart failure.  He was admitted here last month.  He was cardioverted and was briefly back in sinus rhythm.  He is anticoagulated on Xarelto.  He did go back into A-fib.  He follows [facility] cardiology Dr. Preciado.  Tells me he was able to drive here and walking and slowly.  He did not feel like he was going to pass out.  Does have bilateral tremors to both hands that he tells me he has had for many years but is never really been investigated.  Denies any abdominal pain or fever.  He does not have any pleuritic chest pain.  He currently tells me he does not feel winded or short of breath.  He is concerned about being in A-fib again.        Review of Systems    Past Medical History:   Diagnosis Date    Anxiety     Atrial fibrillation     GERD (gastroesophageal reflux disease)     Hyperlipidemia     Renal disorder        No Known Allergies    Past Surgical History:   Procedure Laterality Date    CIRCUMCISION      DENTAL PROCEDURE Bilateral     implants       Family History   Problem Relation Age of Onset    Dementia Mother     No Known Problems Father        Social History     Socioeconomic History    Marital status:    Tobacco Use    Smoking status: Never     Passive exposure: Never    Smokeless tobacco: Never   Vaping Use    Vaping Use: Never used   Substance and Sexual Activity    Alcohol use: Yes     Comment: rare    Drug use: No    Sexual activity: Defer           Objective   Physical Exam  Constitutional:       Appearance: He is well-developed.   HENT:      Head: Normocephalic and atraumatic.      Right Ear: External ear normal.      Left Ear: External ear normal.      Nose: Nose normal.   Eyes:      Conjunctiva/sclera: Conjunctivae normal.      Pupils: Pupils are equal, round, and  reactive to light.   Cardiovascular:      Rate and Rhythm: Normal rate. Rhythm irregular.      Heart sounds: Normal heart sounds.   Pulmonary:      Effort: Pulmonary effort is normal.      Breath sounds: Normal breath sounds.   Abdominal:      General: Bowel sounds are normal.      Palpations: Abdomen is soft.   Musculoskeletal:         General: Normal range of motion.      Cervical back: Normal range of motion and neck supple.   Skin:     General: Skin is warm and dry.   Neurological:      Mental Status: He is alert and oriented to person, place, and time.   Psychiatric:         Behavior: Behavior normal.         Judgment: Judgment normal.         Procedures           ED Course  ED Course as of 10/10/23 1043   Tue Oct 10, 2023   0744 Bilateral tremors noted particularly with finger-to-nose.  Tells me he has had this for quite some time many years.  He denies any headache but has felt lightheaded over the last 2 days.  He currently denies any difficulty breathing chest pain or pressure.  Broad differential.  I do think we need to investigate him being lightheaded particularly with vertigo and an undiagnosed tremor.  I think his A-fib is also playing a part in this along with some underlying anxiety.  Awaiting all lab work.  We will get MRI chest x-ray etc.  Heart rate currently around 100.  He tells me has not missed a dose of his Xarelto. [JM]   1008 POC with Dr. Blakely. I have paged  Dr. Husain. [JM]   1037 I spoke with Dr. Husain.  He is aware the patient.  Patient resting comfortably.  He denies any chest pain pressure or difficulty breathing.  His wife is at the bedside.  Cardiology advised they will speak to electrophysiology and arrange follow-up with the patient.  They also advised that the patient is interested in getting cardioverted again reasonable to attempt.  However if the patient does not want to be that is also reasonable considering he is rate controlled and anticoagulated.  I did discuss  these options with the patient and his wife at the bedside.  The patient is not interested in getting cardioverted again.  I think that is reasonable considering the plan with the cardiologist.  Patient resting comfortably.  He is ready to go home.  Well aware the signs of worse condition. [JM]      ED Course User Index  [JM] Luca Fong APRN                                 MRI Brain With & Without Contrast   Final Result   Impression:   Typical moderate age-related changes are noted as above. There is otherwise no evidence of acute ischemia, hemorrhage, mass or abnormal enhancement.            Electronically Signed: Fox Sullivan MD     10/10/2023 9:12 AM EDT     Workstation ID: [id number]      XR Chest 1 View   Final Result   Impression:   No acute pulmonary process identified.         Electronically Signed: Yamil Barrera MD     10/10/2023 6:39 AM CDT     Workstation ID: [id number]                  Medical Decision Making  Problems Addressed:  Atrial fibrillation, unspecified type: complicated acute illness or injury  Generalized weakness: complicated acute illness or injury    Amount and/or Complexity of Data Reviewed  Labs: ordered.  Radiology: ordered.  ECG/medicine tests: ordered.    Risk  Prescription drug management.        Final diagnoses:   Atrial fibrillation, unspecified type   Generalized weakness       ED Disposition  ED Disposition       ED Disposition   Discharge    Condition   Stable    Comment   --               Deniz Chang MD  [address]  [place] [id number]  [phone]    Schedule an appointment as soon as possible for a visit       [facility] EMERGENCY DEPARTMENT  [address] [id number] [phone]    If symptoms worsen    [facility] CARDIOLOGY  [address]  [facility] [address] [phone]  Schedule an appointment as soon as possible for a visit       Roque Husain,  MD  [address] [place] [address]  [phone]    Schedule an appointment as soon as possible for a visit            Medication List      No changes were made to your prescriptions during this visit.            Luca Fong, APRN  10/10/23 1043

## 2023-10-19 ENCOUNTER — HOSPITAL ENCOUNTER (OUTPATIENT)
Dept: CARDIOLOGY | Facility: HOSPITAL | Age: 63
Discharge: HOME | End: 2023-10-19
Payer: COMMERCIAL

## 2023-10-19 ENCOUNTER — OFFICE VISIT (OUTPATIENT)
Dept: CARDIOLOGY | Facility: HOSPITAL | Age: 63
End: 2023-10-19
Payer: COMMERCIAL

## 2023-10-19 VITALS
DIASTOLIC BLOOD PRESSURE: 68 MMHG | TEMPERATURE: 97.9 F | OXYGEN SATURATION: 100 % | HEART RATE: 65 BPM | RESPIRATION RATE: 16 BRPM | WEIGHT: 210.5 LBS | BODY MASS INDEX: 25.63 KG/M2 | SYSTOLIC BLOOD PRESSURE: 114 MMHG | HEIGHT: 76 IN

## 2023-10-19 DIAGNOSIS — I48.0 PAROXYSMAL ATRIAL FIBRILLATION: Primary | ICD-10-CM

## 2023-10-19 DIAGNOSIS — I51.9 SYSTOLIC DYSFUNCTION: ICD-10-CM

## 2023-10-19 DIAGNOSIS — I48.0 PAROXYSMAL ATRIAL FIBRILLATION: ICD-10-CM

## 2023-10-19 LAB
QT INTERVAL: 394 MS
QTC INTERVAL: 403 MS

## 2023-10-19 PROCEDURE — 93005 ELECTROCARDIOGRAM TRACING: CPT | Performed by: NURSE PRACTITIONER

## 2023-10-19 NOTE — PROGRESS NOTES
"Chief Complaint  Atrial Fibrillation and Follow-up    Subjective      History of Present Illness {  Problem List  Visit  Diagnosis   Encounters  Notes  Medications  Labs  Result Review Imaging  Media :23}     David Jean Baptiste, 63 y.o. male with persistent atrial fibrillation, HFmrEF presents to [facility] for Atrial Fibrillation and Follow-up.    Patient was recently evaluated at [facility] emergency department for complaints of recurrent atrial fibrillation.  Emergency department work-up revealed normal troponin/BNP, CXR with no acute cardiopulmonary findings, and ECGs with no evidence of acute coronary syndrome. Patient was instructed to follow-up at [facility] heart and valve clinic for further evaluation.    Patient presents today with no recurrent atrial fibrillation since emergency department evaluation. Overall doing well. ECG today with normal sinus rhythm; stable QT/QtC.  No chest pain, shortness of breath, tachypalpition. Minimal/rare caffeine, rare alcohol. No sleep apnea.       Objective     Vital Signs:   Vitals:    10/19/23 0841   BP: 114/68   BP Location: Left arm   Patient Position: Sitting   Cuff Size: Adult   Pulse: 65   Resp: 16   Temp: 97.9 °F (36.6 °C)   TempSrc: Temporal   SpO2: 100%   Weight: 95.5 kg (210 lb 8 oz)   Height: 193 cm (76\")     Body mass index is 25.62 kg/m².  Physical Exam  Vitals and nursing note reviewed.   Constitutional:       Appearance: Normal appearance.   HENT:      Head: Normocephalic.   Eyes:      Extraocular Movements: Extraocular movements intact.   Neck:      Vascular: No carotid bruit.   Cardiovascular:      Rate and Rhythm: Normal rate and regular rhythm.      Pulses: Normal pulses.      Heart sounds: Normal heart sounds, S1 normal and S2 normal. No murmur heard.  Pulmonary:      Effort: Pulmonary effort is normal. No respiratory distress.      Breath sounds: Normal breath sounds.   Musculoskeletal:      " Cervical back: Neck supple.      Right lower leg: No edema.      Left lower leg: No edema.   Skin:     General: Skin is warm and dry.   Neurological:      General: No focal deficit present.      Mental Status: He is alert.   Psychiatric:         Mood and Affect: Mood normal.         Behavior: Behavior normal.         Thought Content: Thought content normal.        Data Reviewed:{ Labs  Result Review  Imaging  Med Tab  Media :23}     ECG 12 Lead (10/19/2023 08:47)  ECG 12 Lead ED Triage Standing Order; SOA (10/10/2023 07:12)  Cardioversion External in Cardiology Department (09/22/2023 07:19)   Magnesium (10/10/2023 07:45)  TSH (10/10/2023 07:45)  Single High Sensitivity Troponin T (10/10/2023 07:45)  BNP (10/10/2023 07:45)  Comprehensive Metabolic Panel (10/10/2023 07:45)  Adult Transesophageal Echo (ARIANE) W/ Cont if Necessary Per Protocol (09/02/2023 11:42)     Assessment & Plan   Assessment and Plan {CC Problem List  Visit Diagnosis  ROS  Review (Popup)  Globa.li Maintenance  Quality  BestPractice  Medications  SmartSets  SnapShot Encounters  Media :23}     1. Paroxysmal atrial fibrillation  -Previously hospitalized approximately 1 month ago with atrial fibrillation with RVR and initiation of amiodarone.  Subsequent outpatient cardioversion to normal sinus rhythm.  Recurrent AF prompted recent ED evaluation.  -ECG today with normal sinus rhythm, stable QT/QTc on amiodarone  -[id number]: 2  -Continue anticoagulation with Xarelto  -Continue amiodarone 200 Mg daily, metoprolol tartrate 25 Mg every 12 hours-reviewed may take additional 1 tablet if recurrent atrial fibrillation with RVR and monitor response  -Continue plan to establish with electrophysiology in approximately 3 weeks.    2. Systolic dysfunction  -TTE during hospitalization with LVEF 41-45%.  -Likely tachycardia induced in result of AF with RVR  -Appears well compensated, euvolemic on exam  -Continue metoprolol, lisinopril  -Continue  primary cardiology follow-up in approximately 3 weeks scheduled      Follow Up {Instructions Charge Capture  Follow-up Communications :23}     Return if symptoms worsen or fail to improve.      Patient was given instructions and counseling regarding his condition or for health maintenance advice. Please see specific information pulled into the AVS if appropriate.  Patient was instructed to call the [facility] with any questions, concerns, or worsening symptoms.    Dictated Utilizing Dragon Dictation   Please note that portions of this note were completed with a voice recognition program.   Part of this note may be an electronic transcription/translation of spoken language to printed text using the Dragon Dictation System.

## 2023-11-09 PROBLEM — Z79.01 CHRONIC ANTICOAGULATION: Status: ACTIVE | Noted: 2023-11-09

## 2023-11-09 PROBLEM — I10 PRIMARY HYPERTENSION: Status: ACTIVE | Noted: 2023-11-09

## 2023-11-09 PROBLEM — Z79.899 LONG TERM CURRENT USE OF ANTIARRHYTHMIC DRUG: Status: ACTIVE | Noted: 2023-11-09

## 2023-11-09 NOTE — PROGRESS NOTES
Cardiac Electrophysiology Outpatient Consult Note            [facility]    Consult Note     David Jean Baptiste  [id number]  11/13/2023  [phone]     Primary Care Physician: Deniz Chang MD    Referred By: Roque Husain,*    Subjective     Chief Complaint:   Diagnoses and all orders for this visit:    1. Chronic systolic heart failure (Primary)    2. Paroxysmal atrial fibrillation    3. Long term current use of antiarrhythmic drug    4. Chronic anticoagulation      Chief Complaint   Patient presents with    Paroxysmal atrial fibrillation       History of Present Illness:   David Jean Baptiste is a 63 y.o. male who presents to my electrophysiology clinic for evaluation of palpitations and rapid atrial fibrillation.  Feels poorly.    Past Medical History:   Patient Active Problem List    Diagnosis Date Noted    Chronic anticoagulation 11/09/2023    Long term current use of antiarrhythmic drug 11/09/2023    Primary hypertension 11/09/2023    Chronic systolic heart failure 09/04/2023    Paroxysmal atrial fibrillation 09/01/2023     Note Last Updated: 11/9/2023     [id number] 2  ARIANE with ECV, 9-23  ECV, 9/22/2023         Past Surgical History:   Past Surgical History:   Procedure Laterality Date    CARDIOVERSION      CIRCUMCISION      DENTAL PROCEDURE Bilateral     implants       Social History:   Social History     Socioeconomic History    Marital status:    Tobacco Use    Smoking status: Never     Passive exposure: Never    Smokeless tobacco: Never   Vaping Use    Vaping Use: Never used   Substance and Sexual Activity    Alcohol use: Yes     Comment: rare    Drug use: No    Sexual activity: Defer       Medications:     Current Outpatient Medications:     amiodarone (PACERONE) 200 MG tablet, Take 1 tablet by mouth Daily. Three times daily for 10 days, then 200 mg once daily thereafter, Disp: 30 tablet, Rfl: 5    furosemide (LASIX) 40 MG tablet,  "Take 1 tablet by mouth Daily., Disp: 30 tablet, Rfl: 6    lisinopril (PRINIVIL,ZESTRIL) 5 MG tablet, Take 1 tablet by mouth Daily., Disp: 30 tablet, Rfl: 11    lovastatin (ALTOPREV) 40 MG 24 hr tablet, Take 1 tablet by mouth Every Night., Disp: , Rfl:     metoprolol tartrate (LOPRESSOR) 25 MG tablet, Take 1 tablet by mouth Every 12 (Twelve) Hours., Disp: 60 tablet, Rfl: 5    multivitamin (THERAGRAN) tablet tablet, Take 1 tablet by mouth Daily., Disp: , Rfl:     omeprazole (priLOSEC) 20 MG capsule, Take 1 capsule by mouth Daily., Disp: , Rfl:     potassium chloride (K-TAB) 20 MEQ tablet controlled-release ER tablet, Take 1 tablet by mouth Daily., Disp: 30 tablet, Rfl: 6    rivaroxaban (XARELTO) 20 MG tablet, Take 1 tablet by mouth Daily With Dinner. Indications: Atrial Fibrillation, Disp: 30 tablet, Rfl: 6    sertraline (ZOLOFT) 100 MG tablet, Take 1 tablet by mouth Daily., Disp: , Rfl:     Turmeric 500 MG capsule, Take 1 capsule by mouth Daily., Disp: , Rfl:     vitamin B-12 (CYANOCOBALAMIN) 500 MCG tablet, Take 1 tablet by mouth Daily. OTC, Disp: , Rfl:     Allergies:   No Known Allergies    Objective   Vital Signs:   Vitals:    11/13/23 1455   BP: 118/70   BP Location: Left arm   Patient Position: Sitting   Cuff Size: Adult   Pulse: 68   SpO2: 96%   Weight: 97.1 kg (214 lb)   Height: 193 cm (76\")       PHYSICAL EXAM    Neck: no adenopathy, no carotid bruit, no JVD, and thyroid: not enlarged  Lungs: clear to auscultation bilaterally and no rhonchi or crackles\", ' symmetric  Heart: regular rate and rhythm, S1, S2 normal, no murmur, click, rub or gallop  Abdomen: Soft, non-tender, bowel sounds normal,  no organomegaly  Extremities: extremities normal, atraumatic, no cyanosis or edema      Lab Results   Component Value Date    GLUCOSE 133 (H) 10/10/2023    CALCIUM 9.6 10/10/2023     10/10/2023    K 3.7 10/10/2023    CO2 27.0 10/10/2023     10/10/2023    BUN 12 10/10/2023    CREATININE 1.10 10/10/2023    " EGFRIFNONA 69 09/29/2017    BCR 10.9 10/10/2023    ANIONGAP 11.0 10/10/2023     Lab Results   Component Value Date    WBC 5.36 10/10/2023    HGB 15.9 10/10/2023    HCT 48.4 10/10/2023    MCV 90.3 10/10/2023     10/10/2023     Lab Results   Component Value Date    INR 1.21 (H) 09/01/2023    PROTIME 15.5 (H) 09/01/2023     Lab Results   Component Value Date    TSH 4.160 10/10/2023       Cardiac Testing:      I personally viewed and interpreted the patient's EKG/Telemetry/lab data    Procedures    Tobacco Cessation: N/A  Obstructive Sleep Apnea Screening: Completed    Assessment & Plan    Diagnoses and all orders for this visit:    1. Chronic systolic heart failure (Primary)    2. Paroxysmal atrial fibrillation    3. Long term current use of antiarrhythmic drug    4. Chronic anticoagulation         Diagnosis Plan   1. Chronic systolic heart failure  Consider A-fib induced cardiomyopathy.  LV systolic dysfunction noted during rapid atrial fibrillation.    Rhythm control strategy will afford us the opportunity to investigate this.    Has follow-up with Dr. Husain tomorrow.      2. Paroxysmal atrial fibrillation  Really transitioning to persistent atrial fibrillation at this point.  Cardioversion on 2 occasions now.  Rhythm control strategy with amiodarone.  Breakthrough episodes of A-fib in spite of this.    Amiodarone is not a great option for him long run due to cumulative risk of toxicity.    Lengthy conversation with him about the option of catheter ablation to illuminate his atrial fibrillation.    I reviewed the specific risk-benefit profile the procedure.  I quoted the patient a 1 to 2% chance of significant procedure complication including but not limited to bleeding at the groin, cardiac perforation, tamponade, stroke, myocardial infarction, death phrenic nerve injury, pulmonary vein stenosis, catheter entrapment of the mitral valve annulus, esophageal injury as well as fistula and other potential  complications.    He wishes to proceed with A-fib ablation as soon as possible.    Rhythmia  General anesthesia  Preop CT  Do not hold anticoagulation  Stay on amiodarone for now anticipate stopping this postoperatively        3. Long term current use of antiarrhythmic drug  As above      4. Chronic anticoagulation  Long-term anticoagulation is appropriate for this gentleman given his LV systolic dysfunction.  If we are able to achieve normalization of LV systolic function with rhythm control we will revisit this question.        Body mass index is 26.05 kg/m².    I spent 48 minutes in consultation with this patient which included more than 65% of this time in direct face-to-face counseling, physical examination and discussion of my assessment and findings and this shared decision making with the patient.  The remainder of the time not spent face-to-face was performing one, some or all of the following actions: preparing to see the patient (e.g. reviewing tests, prior clinicians' notes, etc), ordering medications, tests or procedures, coordination of care, discussion of the plan with other healthcare providers, documenting clinical information in epic as well as independently interpreting results and communication of these results to the patient family and/or caregiver(s).  Please note that this explicitly excludes time spent on other separate billable services such as performing procedures or test interpretation, when applicable.    Follow Up:       Thank you for allowing me to participate in the care of your patient. Please to not hesitate to contact me with additional questions or concerns.      Ryan Shaikh DO, [facility], [facility]  Cardiac Electrophysiologist  [facility] / [facility]            Electronically signed by BRODIE Whalen, 11/09/23, 3:15 PM EST.

## 2023-11-09 NOTE — H&P (VIEW-ONLY)
Cardiac Electrophysiology Outpatient Consult Note            [facility] at [facility]    Consult Note     David Jean Baptiste  [id number]  11/13/2023  [phone]     Primary Care Physician: Deniz Chang MD    Referred By: Roque Husain,*    Subjective     Chief Complaint:   Diagnoses and all orders for this visit:    1. Chronic systolic heart failure (Primary)    2. Paroxysmal atrial fibrillation    3. Long term current use of antiarrhythmic drug    4. Chronic anticoagulation      Chief Complaint   Patient presents with    Paroxysmal atrial fibrillation       History of Present Illness:   David Jean Baptiste is a 63 y.o. male who presents to my electrophysiology clinic for evaluation of palpitations and rapid atrial fibrillation.  Feels poorly.    Past Medical History:   Patient Active Problem List    Diagnosis Date Noted    Chronic anticoagulation 11/09/2023    Long term current use of antiarrhythmic drug 11/09/2023    Primary hypertension 11/09/2023    Chronic systolic heart failure 09/04/2023    Paroxysmal atrial fibrillation 09/01/2023     Note Last Updated: 11/9/2023     [id number] 2  ARIANE with ECV, 9-23  ECV, 9/22/2023         Past Surgical History:   Past Surgical History:   Procedure Laterality Date    CARDIOVERSION      CIRCUMCISION      DENTAL PROCEDURE Bilateral     implants       Social History:   Social History     Socioeconomic History    Marital status:    Tobacco Use    Smoking status: Never     Passive exposure: Never    Smokeless tobacco: Never   Vaping Use    Vaping Use: Never used   Substance and Sexual Activity    Alcohol use: Yes     Comment: rare    Drug use: No    Sexual activity: Defer       Medications:     Current Outpatient Medications:     amiodarone (PACERONE) 200 MG tablet, Take 1 tablet by mouth Daily. Three times daily for 10 days, then 200 mg once daily thereafter, Disp: 30 tablet, Rfl: 5    furosemide (LASIX) 40 MG tablet,  "Take 1 tablet by mouth Daily., Disp: 30 tablet, Rfl: 6    lisinopril (PRINIVIL,ZESTRIL) 5 MG tablet, Take 1 tablet by mouth Daily., Disp: 30 tablet, Rfl: 11    lovastatin (ALTOPREV) 40 MG 24 hr tablet, Take 1 tablet by mouth Every Night., Disp: , Rfl:     metoprolol tartrate (LOPRESSOR) 25 MG tablet, Take 1 tablet by mouth Every 12 (Twelve) Hours., Disp: 60 tablet, Rfl: 5    multivitamin (THERAGRAN) tablet tablet, Take 1 tablet by mouth Daily., Disp: , Rfl:     omeprazole (priLOSEC) 20 MG capsule, Take 1 capsule by mouth Daily., Disp: , Rfl:     potassium chloride (K-TAB) 20 MEQ tablet controlled-release ER tablet, Take 1 tablet by mouth Daily., Disp: 30 tablet, Rfl: 6    rivaroxaban (XARELTO) 20 MG tablet, Take 1 tablet by mouth Daily With Dinner. Indications: Atrial Fibrillation, Disp: 30 tablet, Rfl: 6    sertraline (ZOLOFT) 100 MG tablet, Take 1 tablet by mouth Daily., Disp: , Rfl:     Turmeric 500 MG capsule, Take 1 capsule by mouth Daily., Disp: , Rfl:     vitamin B-12 (CYANOCOBALAMIN) 500 MCG tablet, Take 1 tablet by mouth Daily. OTC, Disp: , Rfl:     Allergies:   No Known Allergies    Objective   Vital Signs:   Vitals:    11/13/23 1455   BP: 118/70   BP Location: Left arm   Patient Position: Sitting   Cuff Size: Adult   Pulse: 68   SpO2: 96%   Weight: 97.1 kg (214 lb)   Height: 193 cm (76\")       PHYSICAL EXAM    Neck: no adenopathy, no carotid bruit, no JVD, and thyroid: not enlarged  Lungs: clear to auscultation bilaterally and no rhonchi or crackles\", ' symmetric  Heart: regular rate and rhythm, S1, S2 normal, no murmur, click, rub or gallop  Abdomen: Soft, non-tender, bowel sounds normal,  no organomegaly  Extremities: extremities normal, atraumatic, no cyanosis or edema      Lab Results   Component Value Date    GLUCOSE 133 (H) 10/10/2023    CALCIUM 9.6 10/10/2023     10/10/2023    K 3.7 10/10/2023    CO2 27.0 10/10/2023     10/10/2023    BUN 12 10/10/2023    CREATININE 1.10 10/10/2023    " EGFRIFNONA 69 09/29/2017    BCR 10.9 10/10/2023    ANIONGAP 11.0 10/10/2023     Lab Results   Component Value Date    WBC 5.36 10/10/2023    HGB 15.9 10/10/2023    HCT 48.4 10/10/2023    MCV 90.3 10/10/2023     10/10/2023     Lab Results   Component Value Date    INR 1.21 (H) 09/01/2023    PROTIME 15.5 (H) 09/01/2023     Lab Results   Component Value Date    TSH 4.160 10/10/2023       Cardiac Testing:      I personally viewed and interpreted the patient's EKG/Telemetry/lab data    Procedures    Tobacco Cessation: N/A  Obstructive Sleep Apnea Screening: Completed    Assessment & Plan    Diagnoses and all orders for this visit:    1. Chronic systolic heart failure (Primary)    2. Paroxysmal atrial fibrillation    3. Long term current use of antiarrhythmic drug    4. Chronic anticoagulation         Diagnosis Plan   1. Chronic systolic heart failure  Consider A-fib induced cardiomyopathy.  LV systolic dysfunction noted during rapid atrial fibrillation.    Rhythm control strategy will afford us the opportunity to investigate this.    Has follow-up with Dr. Husain tomorrow.      2. Paroxysmal atrial fibrillation  Really transitioning to persistent atrial fibrillation at this point.  Cardioversion on 2 occasions now.  Rhythm control strategy with amiodarone.  Breakthrough episodes of A-fib in spite of this.    Amiodarone is not a great option for him long run due to cumulative risk of toxicity.    Lengthy conversation with him about the option of catheter ablation to illuminate his atrial fibrillation.    I reviewed the specific risk-benefit profile the procedure.  I quoted the patient a 1 to 2% chance of significant procedure complication including but not limited to bleeding at the groin, cardiac perforation, tamponade, stroke, myocardial infarction, death phrenic nerve injury, pulmonary vein stenosis, catheter entrapment of the mitral valve annulus, esophageal injury as well as fistula and other potential  complications.    He wishes to proceed with A-fib ablation as soon as possible.    Rhythmia  General anesthesia  Preop CT  Do not hold anticoagulation  Stay on amiodarone for now anticipate stopping this postoperatively        3. Long term current use of antiarrhythmic drug  As above      4. Chronic anticoagulation  Long-term anticoagulation is appropriate for this gentleman given his LV systolic dysfunction.  If we are able to achieve normalization of LV systolic function with rhythm control we will revisit this question.        Body mass index is 26.05 kg/m².    I spent 48 minutes in consultation with this patient which included more than 65% of this time in direct face-to-face counseling, physical examination and discussion of my assessment and findings and this shared decision making with the patient.  The remainder of the time not spent face-to-face was performing one, some or all of the following actions: preparing to see the patient (e.g. reviewing tests, prior clinicians' notes, etc), ordering medications, tests or procedures, coordination of care, discussion of the plan with other healthcare providers, documenting clinical information in epic as well as independently interpreting results and communication of these results to the patient family and/or caregiver(s).  Please note that this explicitly excludes time spent on other separate billable services such as performing procedures or test interpretation, when applicable.    Follow Up:       Thank you for allowing me to participate in the care of your patient. Please to not hesitate to contact me with additional questions or concerns.      Ryan Shaikh DO, [facility], [facility]  Cardiac Electrophysiologist  [facility] / [facility]            Electronically signed by BRODIE Whalen, 11/09/23, 3:15 PM EST.

## 2023-11-13 ENCOUNTER — OFFICE VISIT (OUTPATIENT)
Dept: CARDIOLOGY | Facility: CLINIC | Age: 63
End: 2023-11-13
Payer: COMMERCIAL

## 2023-11-13 VITALS
HEIGHT: 76 IN | BODY MASS INDEX: 26.06 KG/M2 | SYSTOLIC BLOOD PRESSURE: 118 MMHG | DIASTOLIC BLOOD PRESSURE: 70 MMHG | OXYGEN SATURATION: 96 % | WEIGHT: 214 LBS | HEART RATE: 68 BPM

## 2023-11-13 DIAGNOSIS — Z79.899 LONG TERM CURRENT USE OF ANTIARRHYTHMIC DRUG: ICD-10-CM

## 2023-11-13 DIAGNOSIS — I48.0 PAROXYSMAL ATRIAL FIBRILLATION: Primary | ICD-10-CM

## 2023-11-13 DIAGNOSIS — Z79.01 CHRONIC ANTICOAGULATION: ICD-10-CM

## 2023-11-13 DIAGNOSIS — I48.0 PAROXYSMAL ATRIAL FIBRILLATION: ICD-10-CM

## 2023-11-13 DIAGNOSIS — I50.22 CHRONIC SYSTOLIC HEART FAILURE: Primary | ICD-10-CM

## 2023-11-13 PROCEDURE — 99204 OFFICE O/P NEW MOD 45 MIN: CPT | Performed by: INTERNAL MEDICINE

## 2023-11-14 ENCOUNTER — PREP FOR SURGERY (OUTPATIENT)
Dept: OTHER | Facility: HOSPITAL | Age: 63
End: 2023-11-14
Payer: COMMERCIAL

## 2023-11-14 DIAGNOSIS — I48.0 PAROXYSMAL ATRIAL FIBRILLATION: Primary | ICD-10-CM

## 2023-11-14 RX ORDER — ACETAMINOPHEN 325 MG/1
650 TABLET ORAL EVERY 4 HOURS PRN
OUTPATIENT
Start: 2023-11-14

## 2023-11-14 RX ORDER — ONDANSETRON 2 MG/ML
4 INJECTION INTRAMUSCULAR; INTRAVENOUS EVERY 6 HOURS PRN
OUTPATIENT
Start: 2023-11-14

## 2023-11-14 RX ORDER — SODIUM CHLORIDE 9 MG/ML
40 INJECTION, SOLUTION INTRAVENOUS AS NEEDED
OUTPATIENT
Start: 2023-11-14

## 2023-11-14 RX ORDER — NITROGLYCERIN 0.4 MG/1
0.4 TABLET SUBLINGUAL
OUTPATIENT
Start: 2023-11-14

## 2023-11-28 RX ORDER — AMIODARONE HYDROCHLORIDE 200 MG/1
200 TABLET ORAL DAILY
Qty: 30 TABLET | Refills: 0 | Status: SHIPPED | OUTPATIENT
Start: 2023-11-28

## 2023-11-28 RX ORDER — LISINOPRIL 5 MG/1
5 TABLET ORAL DAILY
Qty: 90 TABLET | Refills: 1 | Status: SHIPPED | OUTPATIENT
Start: 2023-11-28

## 2023-11-29 ENCOUNTER — TELEPHONE (OUTPATIENT)
Dept: CARDIOLOGY | Facility: CLINIC | Age: 63
End: 2023-11-29
Payer: COMMERCIAL

## 2023-11-29 NOTE — TELEPHONE ENCOUNTER
Caller: David Jean Baptiste    Relationship: Self    Best call back number: [phone]    What is the best time to reach you: ANY ( LEAVE VM)    Who are you requesting to speak with (clinical staff, provider,  specific staff member): CLINICAL        What was the call regarding: PATIENT STATES HIS WIFE HAS TESTED POSITIVE FOR COVID ON 11-28-23 AND HE IS SCHEDULED FOR AN ABLATION ON 12-06-23. PATIENT IS ISOLATING FROM HIS WIFE AND IS A-SYMPTOMATIC. PATIENT IS INQUIRING IF DR PARK WANTS TO PROCEED WITH THE ABLATION ON 12-6-23.    Is it okay if the provider responds through [employer]: PLEASE CALL

## 2023-12-04 NOTE — NURSING NOTE
PRE-PVA ASSESSMENT  David Jean Baptiste 1960   [address] [place] [address] [address] [place] [address]   [phone]  Referral Source: Dr Husain  Information obtained from: [x] Medical record review  [x] Patient report  Scheduled for: PVA on 12/6/23 with Dr. Shaikh  No Known Allergies    AFib Specific History:  AFIBTYPE: paroxysmal    CHADS-VASc Risk Assessment              1 Total Score    1 Hypertension        Criteria that do not apply:    CHF    Age >/= 75    DM    PRIOR STROKE/TIA/THROMBO    Vascular Disease    Age 65-74    Sex: Female            Anticoagulation: Xarelto 20 mg daily NO MISSED DOSES  Cardioversion x 2  Failed AAD(s): amiodarone   Prior Ablation: No    Is Mr. Jean Baptiste aware of his AFib? Yes   Onset: 9/2023   Exacerbations: none   Frequency: q 2 wks   Alleviations: none    Duration: 1-2 days      Symptoms:   [x] Palpitations:    [] Chest Discomfort:    [] Dizziness:    [] Presyncope:    [] Lightheadedness:   [] Syncope:    [x] Fatigue:    [] Other:    [x] Short of Breath:     Last Echo(s):  [x] ARIANE Date: 9/2/23    Left ventricular systolic function is mildly decreased. Left ventricular ejection fraction appears to be 41 - 45%.    No evidence of a left atrial appendage thrombus was present.    Trace to mild aortic valve regurgitation is present.    Mild mitral valve regurgitation is present.     Past medical History:   [] Diabetes  No                Hemoglobin A1C   Date Value Ref Range Status   09/01/2023 5.80 (H) 4.80 - 5.60 % Final          [] HYPOthyroidism No   [] HYPERthyroidism No          TSH   Date Value Ref Range Status   10/10/2023 4.160 0.270 - 4.200 uIU/mL Final   09/01/2023 1.650 0.270 - 4.200 uIU/mL Final     [x] HTN        [x] Tx prinivil     [x] Heart Failure    [] CVA    No                           [] TIA   No       [] Ischemic         [] Hemorrhagic         [] Nonischemic         [] Embolic        [] Diastolic    [] CAD    No     [] MI  No          [x] Dyslipidemia lovastatin   []  Statin indicated    [] Ischemic Evaluation No    [] Sleep Apnea Suspected Denies      [] Obesity No BMI 26.05    Summary of Patient Contact:    I spoke with Mr. Jean Baptiste about his upcoming PVA.   He was well informed about the procedure from prior discussion with Dr. Shaikh and from reading the provided literature.  We discussed the procedure at length including risks, anesthesia, intra-op procedures, recovery, bedrest, sheath removal, discharge criteria, normal post-procedure expectations, and success rates.  I answered a few remaining questions. Mr. Jean Baptiste verbalized understanding and he is ready to proceed.       Inga Knowles RN

## 2023-12-05 ENCOUNTER — PRE-ADMISSION TESTING (OUTPATIENT)
Dept: PREADMISSION TESTING | Facility: HOSPITAL | Age: 63
End: 2023-12-05
Payer: COMMERCIAL

## 2023-12-05 ENCOUNTER — HOSPITAL ENCOUNTER (OUTPATIENT)
Dept: CT IMAGING | Facility: HOSPITAL | Age: 63
Discharge: HOME | End: 2023-12-05
Payer: COMMERCIAL

## 2023-12-05 ENCOUNTER — ANESTHESIA EVENT (OUTPATIENT)
Dept: CARDIOLOGY | Facility: HOSPITAL | Age: 63
End: 2023-12-05
Payer: COMMERCIAL

## 2023-12-05 DIAGNOSIS — I48.0 PAROXYSMAL ATRIAL FIBRILLATION: ICD-10-CM

## 2023-12-05 LAB
ANION GAP SERPL CALCULATED.3IONS-SCNC: 9 MMOL/L (ref 5–15)
BUN SERPL-MCNC: 14 MG/DL (ref 8–23)
BUN/CREAT SERPL: 13.7 (ref 7–25)
CALCIUM SPEC-SCNC: 9.4 MG/DL (ref 8.6–10.5)
CHLORIDE SERPL-SCNC: 103 MMOL/L (ref 98–107)
CO2 SERPL-SCNC: 30 MMOL/L (ref 22–29)
CREAT SERPL-MCNC: 1.02 MG/DL (ref 0.76–1.27)
DEPRECATED RDW RBC AUTO: 45.8 FL (ref 37–54)
EGFRCR SERPLBLD CKD-EPI 2021: 82.6 ML/MIN/1.73
ERYTHROCYTE [DISTWIDTH] IN BLOOD BY AUTOMATED COUNT: 13.7 % (ref 12.3–15.4)
GLUCOSE SERPL-MCNC: 129 MG/DL (ref 65–99)
HCT VFR BLD AUTO: 41 % (ref 37.5–51)
HGB BLD-MCNC: 13.2 G/DL (ref 13–17.7)
MCH RBC QN AUTO: 29.2 PG (ref 26.6–33)
MCHC RBC AUTO-ENTMCNC: 32.2 G/DL (ref 31.5–35.7)
MCV RBC AUTO: 90.7 FL (ref 79–97)
PLATELET # BLD AUTO: 177 10*3/MM3 (ref 140–450)
PMV BLD AUTO: 9.5 FL (ref 6–12)
POTASSIUM SERPL-SCNC: 4.2 MMOL/L (ref 3.5–5.2)
RBC # BLD AUTO: 4.52 10*6/MM3 (ref 4.14–5.8)
SODIUM SERPL-SCNC: 142 MMOL/L (ref 136–145)
WBC NRBC COR # BLD AUTO: 4.13 10*3/MM3 (ref 3.4–10.8)

## 2023-12-05 PROCEDURE — 25510000001 IOPAMIDOL PER 1 ML: Performed by: INTERNAL MEDICINE

## 2023-12-05 PROCEDURE — 80048 BASIC METABOLIC PNL TOTAL CA: CPT

## 2023-12-05 PROCEDURE — 85027 COMPLETE CBC AUTOMATED: CPT

## 2023-12-05 PROCEDURE — 36415 COLL VENOUS BLD VENIPUNCTURE: CPT

## 2023-12-05 PROCEDURE — 71275 CT ANGIOGRAPHY CHEST: CPT

## 2023-12-05 RX ADMIN — IOPAMIDOL 80 ML: 755 INJECTION, SOLUTION INTRAVENOUS at 12:24

## 2023-12-05 NOTE — PAT
An arrival time for procedure was not provided during PAT visit. If patient had any questions or concerns about their arrival time, they were instructed to contact their surgeon/physician.  Additionally, if the patient referred to an arrival time that was acquired from their my chart account, patient was encouraged to verify that time with their surgeon/physician. Arrival times are NOT provided in Pre Admission Testing Department.     Patient denies any current skin issues.      Pt brought the teaching information he received form the office, which included CVOUHYDRATION protocol. Theses instructinos reviewed with pt who verbalized understanding

## 2023-12-06 ENCOUNTER — ANESTHESIA (OUTPATIENT)
Dept: CARDIOLOGY | Facility: HOSPITAL | Age: 63
End: 2023-12-06
Payer: COMMERCIAL

## 2023-12-06 ENCOUNTER — HOSPITAL ENCOUNTER (OUTPATIENT)
Facility: HOSPITAL | Age: 63
Discharge: HOME | End: 2023-12-06
Attending: INTERNAL MEDICINE | Admitting: INTERNAL MEDICINE
Payer: COMMERCIAL

## 2023-12-06 VITALS
OXYGEN SATURATION: 98 % | SYSTOLIC BLOOD PRESSURE: 123 MMHG | HEIGHT: 76 IN | WEIGHT: 214.8 LBS | RESPIRATION RATE: 10 BRPM | HEART RATE: 71 BPM | BODY MASS INDEX: 26.16 KG/M2 | TEMPERATURE: 97 F | DIASTOLIC BLOOD PRESSURE: 68 MMHG

## 2023-12-06 DIAGNOSIS — I48.0 PAROXYSMAL ATRIAL FIBRILLATION: ICD-10-CM

## 2023-12-06 LAB
ACT BLD: 325 SECONDS (ref 82–152)
ACT BLD: 347 SECONDS (ref 82–152)
QT INTERVAL: 416 MS
QTC INTERVAL: 439 MS

## 2023-12-06 PROCEDURE — C1760 CLOSURE DEV, VASC: HCPCS | Performed by: INTERNAL MEDICINE

## 2023-12-06 PROCEDURE — 93656 COMPRE EP EVAL ABLTJ ATR FIB: CPT | Performed by: INTERNAL MEDICINE

## 2023-12-06 PROCEDURE — C1730 CATH, EP, 19 OR FEW ELECT: HCPCS | Performed by: INTERNAL MEDICINE

## 2023-12-06 PROCEDURE — C1893 INTRO/SHEATH, FIXED,NON-PEEL: HCPCS | Performed by: INTERNAL MEDICINE

## 2023-12-06 PROCEDURE — C2630 CATH, EP, COOL-TIP: HCPCS | Performed by: INTERNAL MEDICINE

## 2023-12-06 PROCEDURE — 25810000003 SODIUM CHLORIDE 0.9 % SOLUTION 250 ML FLEX CONT

## 2023-12-06 PROCEDURE — C1766 INTRO/SHEATH,STRBLE,NON-PEEL: HCPCS | Performed by: INTERNAL MEDICINE

## 2023-12-06 PROCEDURE — C1894 INTRO/SHEATH, NON-LASER: HCPCS | Performed by: INTERNAL MEDICINE

## 2023-12-06 PROCEDURE — 25010000002 SUGAMMADEX 200 MG/2ML SOLUTION

## 2023-12-06 PROCEDURE — 25010000002 ONDANSETRON PER 1 MG

## 2023-12-06 PROCEDURE — 25010000002 PROTAMINE SULFATE PER 10 MG: Performed by: INTERNAL MEDICINE

## 2023-12-06 PROCEDURE — C1769 GUIDE WIRE: HCPCS | Performed by: INTERNAL MEDICINE

## 2023-12-06 PROCEDURE — 93623 PRGRMD STIMJ&PACG IV RX NFS: CPT | Performed by: INTERNAL MEDICINE

## 2023-12-06 PROCEDURE — 85347 COAGULATION TIME ACTIVATED: CPT

## 2023-12-06 PROCEDURE — 93005 ELECTROCARDIOGRAM TRACING: CPT | Performed by: INTERNAL MEDICINE

## 2023-12-06 PROCEDURE — 25010000002 HEPARIN (PORCINE) PER 1000 UNITS: Performed by: INTERNAL MEDICINE

## 2023-12-06 PROCEDURE — 25010000002 DEXAMETHASONE PER 1 MG

## 2023-12-06 PROCEDURE — 25010000002 LIDOCAINE 1 % SOLUTION: Performed by: INTERNAL MEDICINE

## 2023-12-06 PROCEDURE — C1759 CATH, INTRA ECHOCARDIOGRAPHY: HCPCS | Performed by: INTERNAL MEDICINE

## 2023-12-06 PROCEDURE — 25010000002 PROPOFOL 10 MG/ML EMULSION

## 2023-12-06 PROCEDURE — 93622 COMP EP EVAL L VENTR PAC&REC: CPT | Performed by: INTERNAL MEDICINE

## 2023-12-06 PROCEDURE — 25010000002 CEFAZOLIN PER 500 MG: Performed by: ANESTHESIOLOGY

## 2023-12-06 PROCEDURE — 25010000002 PHENYLEPHRINE 10 MG/ML SOLUTION 1 ML VIAL

## 2023-12-06 PROCEDURE — 25810000003 SODIUM CHLORIDE 0.9 % SOLUTION: Performed by: INTERNAL MEDICINE

## 2023-12-06 PROCEDURE — C1732 CATH, EP, DIAG/ABL, 3D/VECT: HCPCS | Performed by: INTERNAL MEDICINE

## 2023-12-06 PROCEDURE — 25010000002 ADENOSINE PER 6 MG: Performed by: INTERNAL MEDICINE

## 2023-12-06 RX ORDER — SODIUM CHLORIDE 9 MG/ML
40 INJECTION, SOLUTION INTRAVENOUS AS NEEDED
Status: DISCONTINUED | OUTPATIENT
Start: 2023-12-06 | End: 2023-12-06 | Stop reason: HOSPADM

## 2023-12-06 RX ORDER — ONDANSETRON 2 MG/ML
INJECTION INTRAMUSCULAR; INTRAVENOUS AS NEEDED
Status: DISCONTINUED | OUTPATIENT
Start: 2023-12-06 | End: 2023-12-06 | Stop reason: SURG

## 2023-12-06 RX ORDER — HYDROMORPHONE HYDROCHLORIDE 1 MG/ML
0.5 INJECTION, SOLUTION INTRAMUSCULAR; INTRAVENOUS; SUBCUTANEOUS
Status: DISCONTINUED | OUTPATIENT
Start: 2023-12-06 | End: 2023-12-06 | Stop reason: HOSPADM

## 2023-12-06 RX ORDER — PANTOPRAZOLE SODIUM 40 MG/1
40 TABLET, DELAYED RELEASE ORAL
Status: DISCONTINUED | OUTPATIENT
Start: 2023-12-07 | End: 2023-12-06 | Stop reason: HOSPADM

## 2023-12-06 RX ORDER — SODIUM CHLORIDE 0.9 % (FLUSH) 0.9 %
3-10 SYRINGE (ML) INJECTION AS NEEDED
Status: DISCONTINUED | OUTPATIENT
Start: 2023-12-06 | End: 2023-12-06 | Stop reason: HOSPADM

## 2023-12-06 RX ORDER — PROTAMINE SULFATE 10 MG/ML
INJECTION, SOLUTION INTRAVENOUS
Status: DISCONTINUED | OUTPATIENT
Start: 2023-12-06 | End: 2023-12-06 | Stop reason: HOSPADM

## 2023-12-06 RX ORDER — PROPOFOL 10 MG/ML
VIAL (ML) INTRAVENOUS AS NEEDED
Status: DISCONTINUED | OUTPATIENT
Start: 2023-12-06 | End: 2023-12-06 | Stop reason: SURG

## 2023-12-06 RX ORDER — MEPERIDINE HYDROCHLORIDE 25 MG/ML
12.5 INJECTION INTRAMUSCULAR; INTRAVENOUS; SUBCUTANEOUS
Status: DISCONTINUED | OUTPATIENT
Start: 2023-12-06 | End: 2023-12-06 | Stop reason: HOSPADM

## 2023-12-06 RX ORDER — ADENOSINE 3 MG/ML
INJECTION, SOLUTION INTRAVENOUS
Status: DISCONTINUED | OUTPATIENT
Start: 2023-12-06 | End: 2023-12-06 | Stop reason: HOSPADM

## 2023-12-06 RX ORDER — ROCURONIUM BROMIDE 10 MG/ML
INJECTION, SOLUTION INTRAVENOUS AS NEEDED
Status: DISCONTINUED | OUTPATIENT
Start: 2023-12-06 | End: 2023-12-06 | Stop reason: SURG

## 2023-12-06 RX ORDER — DROPERIDOL 2.5 MG/ML
0.62 INJECTION, SOLUTION INTRAMUSCULAR; INTRAVENOUS ONCE AS NEEDED
Status: DISCONTINUED | OUTPATIENT
Start: 2023-12-06 | End: 2023-12-06 | Stop reason: HOSPADM

## 2023-12-06 RX ORDER — HEPARIN SODIUM 10000 [USP'U]/100ML
INJECTION, SOLUTION INTRAVENOUS
Status: COMPLETED | OUTPATIENT
Start: 2023-12-06 | End: 2023-12-06

## 2023-12-06 RX ORDER — EPHEDRINE SULFATE 50 MG/ML
INJECTION, SOLUTION INTRAVENOUS AS NEEDED
Status: DISCONTINUED | OUTPATIENT
Start: 2023-12-06 | End: 2023-12-06 | Stop reason: SURG

## 2023-12-06 RX ORDER — HYDROCODONE BITARTRATE AND ACETAMINOPHEN 5; 325 MG/1; MG/1
1 TABLET ORAL ONCE AS NEEDED
Status: DISCONTINUED | OUTPATIENT
Start: 2023-12-06 | End: 2023-12-06 | Stop reason: HOSPADM

## 2023-12-06 RX ORDER — HEPARIN SODIUM 1000 [USP'U]/ML
INJECTION, SOLUTION INTRAVENOUS; SUBCUTANEOUS
Status: DISCONTINUED | OUTPATIENT
Start: 2023-12-06 | End: 2023-12-06 | Stop reason: HOSPADM

## 2023-12-06 RX ORDER — GLYCOPYRROLATE 0.2 MG/ML
INJECTION INTRAMUSCULAR; INTRAVENOUS AS NEEDED
Status: DISCONTINUED | OUTPATIENT
Start: 2023-12-06 | End: 2023-12-06 | Stop reason: SURG

## 2023-12-06 RX ORDER — NALOXONE HCL 0.4 MG/ML
0.4 VIAL (ML) INJECTION AS NEEDED
Status: DISCONTINUED | OUTPATIENT
Start: 2023-12-06 | End: 2023-12-06 | Stop reason: HOSPADM

## 2023-12-06 RX ORDER — HYDRALAZINE HYDROCHLORIDE 20 MG/ML
5 INJECTION INTRAMUSCULAR; INTRAVENOUS
Status: DISCONTINUED | OUTPATIENT
Start: 2023-12-06 | End: 2023-12-06 | Stop reason: HOSPADM

## 2023-12-06 RX ORDER — ONDANSETRON 2 MG/ML
4 INJECTION INTRAMUSCULAR; INTRAVENOUS EVERY 6 HOURS PRN
Status: DISCONTINUED | OUTPATIENT
Start: 2023-12-06 | End: 2023-12-06 | Stop reason: HOSPADM

## 2023-12-06 RX ORDER — DROPERIDOL 2.5 MG/ML
0.62 INJECTION, SOLUTION INTRAMUSCULAR; INTRAVENOUS
Status: DISCONTINUED | OUTPATIENT
Start: 2023-12-06 | End: 2023-12-06 | Stop reason: HOSPADM

## 2023-12-06 RX ORDER — FENTANYL CITRATE 50 UG/ML
50 INJECTION, SOLUTION INTRAMUSCULAR; INTRAVENOUS
Status: DISCONTINUED | OUTPATIENT
Start: 2023-12-06 | End: 2023-12-06 | Stop reason: HOSPADM

## 2023-12-06 RX ORDER — PROMETHAZINE HYDROCHLORIDE 25 MG/1
25 SUPPOSITORY RECTAL ONCE AS NEEDED
Status: DISCONTINUED | OUTPATIENT
Start: 2023-12-06 | End: 2023-12-06 | Stop reason: HOSPADM

## 2023-12-06 RX ORDER — NITROGLYCERIN 0.4 MG/1
0.4 TABLET SUBLINGUAL
Status: DISCONTINUED | OUTPATIENT
Start: 2023-12-06 | End: 2023-12-06 | Stop reason: HOSPADM

## 2023-12-06 RX ORDER — SODIUM CHLORIDE 9 MG/ML
INJECTION, SOLUTION INTRAVENOUS CONTINUOUS PRN
Status: DISCONTINUED | OUTPATIENT
Start: 2023-12-06 | End: 2023-12-06 | Stop reason: SURG

## 2023-12-06 RX ORDER — LIDOCAINE HYDROCHLORIDE 10 MG/ML
INJECTION, SOLUTION INFILTRATION; PERINEURAL
Status: DISCONTINUED | OUTPATIENT
Start: 2023-12-06 | End: 2023-12-06 | Stop reason: HOSPADM

## 2023-12-06 RX ORDER — DEXAMETHASONE SODIUM PHOSPHATE 4 MG/ML
INJECTION, SOLUTION INTRA-ARTICULAR; INTRALESIONAL; INTRAMUSCULAR; INTRAVENOUS; SOFT TISSUE AS NEEDED
Status: DISCONTINUED | OUTPATIENT
Start: 2023-12-06 | End: 2023-12-06 | Stop reason: SURG

## 2023-12-06 RX ORDER — PROMETHAZINE HYDROCHLORIDE 25 MG/1
25 TABLET ORAL ONCE AS NEEDED
Status: DISCONTINUED | OUTPATIENT
Start: 2023-12-06 | End: 2023-12-06 | Stop reason: HOSPADM

## 2023-12-06 RX ORDER — IPRATROPIUM BROMIDE AND ALBUTEROL SULFATE 2.5; .5 MG/3ML; MG/3ML
3 SOLUTION RESPIRATORY (INHALATION) ONCE AS NEEDED
Status: DISCONTINUED | OUTPATIENT
Start: 2023-12-06 | End: 2023-12-06 | Stop reason: HOSPADM

## 2023-12-06 RX ORDER — LABETALOL HYDROCHLORIDE 5 MG/ML
5 INJECTION, SOLUTION INTRAVENOUS
Status: DISCONTINUED | OUTPATIENT
Start: 2023-12-06 | End: 2023-12-06 | Stop reason: HOSPADM

## 2023-12-06 RX ORDER — ACETAMINOPHEN 325 MG/1
650 TABLET ORAL EVERY 4 HOURS PRN
Status: DISCONTINUED | OUTPATIENT
Start: 2023-12-06 | End: 2023-12-06 | Stop reason: HOSPADM

## 2023-12-06 RX ORDER — SODIUM CHLORIDE 9 MG/ML
INJECTION, SOLUTION INTRAVENOUS
Status: DISCONTINUED | OUTPATIENT
Start: 2023-12-06 | End: 2023-12-06 | Stop reason: HOSPADM

## 2023-12-06 RX ORDER — SODIUM CHLORIDE 0.9 % (FLUSH) 0.9 %
3 SYRINGE (ML) INJECTION EVERY 12 HOURS SCHEDULED
Status: DISCONTINUED | OUTPATIENT
Start: 2023-12-06 | End: 2023-12-06 | Stop reason: HOSPADM

## 2023-12-06 RX ORDER — ONDANSETRON 2 MG/ML
4 INJECTION INTRAMUSCULAR; INTRAVENOUS ONCE AS NEEDED
Status: DISCONTINUED | OUTPATIENT
Start: 2023-12-06 | End: 2023-12-06 | Stop reason: HOSPADM

## 2023-12-06 RX ORDER — LIDOCAINE HYDROCHLORIDE 10 MG/ML
INJECTION, SOLUTION EPIDURAL; INFILTRATION; INTRACAUDAL; PERINEURAL AS NEEDED
Status: DISCONTINUED | OUTPATIENT
Start: 2023-12-06 | End: 2023-12-06 | Stop reason: SURG

## 2023-12-06 RX ADMIN — ONDANSETRON 4 MG: 2 INJECTION INTRAMUSCULAR; INTRAVENOUS at 07:47

## 2023-12-06 RX ADMIN — PROPOFOL 150 MG: 10 INJECTION, EMULSION INTRAVENOUS at 07:53

## 2023-12-06 RX ADMIN — EPHEDRINE SULFATE 5 MG: 50 INJECTION INTRAVENOUS at 08:22

## 2023-12-06 RX ADMIN — SUGAMMADEX 200 MG: 100 INJECTION, SOLUTION INTRAVENOUS at 09:34

## 2023-12-06 RX ADMIN — GLYCOPYRROLATE 0.2 MG: 0.2 INJECTION INTRAMUSCULAR; INTRAVENOUS at 08:26

## 2023-12-06 RX ADMIN — EPHEDRINE SULFATE 5 MG: 50 INJECTION INTRAVENOUS at 08:42

## 2023-12-06 RX ADMIN — ROCURONIUM BROMIDE 50 MG: 10 SOLUTION INTRAVENOUS at 07:53

## 2023-12-06 RX ADMIN — EPHEDRINE SULFATE 10 MG: 50 INJECTION INTRAVENOUS at 09:31

## 2023-12-06 RX ADMIN — EPHEDRINE SULFATE 10 MG: 50 INJECTION INTRAVENOUS at 08:31

## 2023-12-06 RX ADMIN — DEXAMETHASONE SODIUM PHOSPHATE 4 MG: 4 INJECTION, SOLUTION INTRAMUSCULAR; INTRAVENOUS at 07:59

## 2023-12-06 RX ADMIN — PHENYLEPHRINE HYDROCHLORIDE 0.3 MCG/KG/MIN: 10 INJECTION INTRAVENOUS at 08:05

## 2023-12-06 RX ADMIN — CEFAZOLIN SODIUM 2 G: 1 INJECTION, POWDER, FOR SOLUTION INTRAMUSCULAR; INTRAVENOUS at 07:53

## 2023-12-06 RX ADMIN — SODIUM CHLORIDE: 900 INJECTION INTRAVENOUS at 07:42

## 2023-12-06 RX ADMIN — ROCURONIUM BROMIDE 10 MG: 10 SOLUTION INTRAVENOUS at 09:22

## 2023-12-06 RX ADMIN — LIDOCAINE HYDROCHLORIDE 50 MG: 10 INJECTION, SOLUTION EPIDURAL; INFILTRATION; INTRACAUDAL; PERINEURAL at 07:53

## 2023-12-06 NOTE — OP NOTE
"          Cardiac Electrophysiology Procedure Note           [facility]         CATHETER ABLATION FOR ATRIAL FIBRILLATION (PVI)    PROCEDURES PERFORMED:    Catheter ablation of paroxysmal atrial fibrillation  Full diagnostic EP study  3D electroanatomic mapping  drug infusion / programmed pacing  Intracadiac Echocardiography  Double transeptal puncture  Left ventricular pacing and recording    PREPROCEDURAL DIAGNOSES:    1.  Paroxysmal atrial fibrillation  2. [id number] score of 3  3.  DOAC    POST PROCEDURE DIANGOSES:  As above.    INDICATION FOR PROCEDURE:  Briefly, David Jean Baptiste is a 63 y.o. year old male with a history of highly symptomatic recurrent paroxysmal atrial fibrillation refractory to amiodarone therapy presents for elective outpatient catheter ablation.    ANTICOAGULATION STRATEGY PRIOR TO AND POST PROCEDURE: DOAC.  The last dose of anticoagulant was confirmed to have been taken last evening.      PT/INR:  No results found for: \"LABPROT\", \"INR\"  PTT:  No results found for: \"APTT\"    CBC:   WBC   Date Value Ref Range Status   12/05/2023 4.13 3.40 - 10.80 10*3/mm3 Final     RBC   Date Value Ref Range Status   12/05/2023 4.52 4.14 - 5.80 10*6/mm3 Final     Hemoglobin   Date Value Ref Range Status   12/05/2023 13.2 13.0 - 17.7 g/dL Final     Hematocrit   Date Value Ref Range Status   12/05/2023 41.0 37.5 - 51.0 % Final     MCV   Date Value Ref Range Status   12/05/2023 90.7 79.0 - 97.0 fL Final     RDW   Date Value Ref Range Status   12/05/2023 13.7 12.3 - 15.4 % Final     Platelets   Date Value Ref Range Status   12/05/2023 177 140 - 450 10*3/mm3 Final     BMP:     Sodium   Date Value Ref Range Status   12/05/2023 142 136 - 145 mmol/L Final     Potassium   Date Value Ref Range Status   12/05/2023 4.2 3.5 - 5.2 mmol/L Final     Chloride   Date Value Ref Range Status   12/05/2023 103 98 - 107 mmol/L Final     CO2   Date Value Ref Range Status   12/05/2023 30.0 " "(H) 22.0 - 29.0 mmol/L Final     BUN   Date Value Ref Range Status   12/05/2023 14 8 - 23 mg/dL Final     Creatinine   Date Value Ref Range Status   12/05/2023 1.02 0.76 - 1.27 mg/dL Final     eGFR   Date Value Ref Range Status   12/05/2023 82.6 >60.0 mL/min/1.73 Final       Vital Signs: /76   Pulse 65   Temp 97 °F (36.1 °C) (Tympanic)   Resp 10   Ht 193 cm (76\")   Wt 97.4 kg (214 lb 12.8 oz)   SpO2 100%   BMI 26.15 kg/m²  O2 Flow Rate (L/min): 2 L/min   Admit Weight:  97.4 kg (214 lb 12.8 oz)  BMI: Body mass index is 26.15 kg/m².    PROCEDURE NARRATIVE:    The patient was able to give written informed consent after revisiting the key portions of the risk versus benefit profile of the procedure.  This discussion was framed by our lengthy conversations  (please see our detailed notes).  Patient verbalized strong understanding of this discussion and a strong desire to proceed with the procedure.  Please note that this detailed informed consent process utilized mutual and shared decision making process between all parties involved, principally the physician and patient, but also potentially with input from the patient's selected family and friends.    The patient was brought to the EP laboratory in the post absorptive state.  The patient was electively intubated for the procedure and given a general anesthetic by colleagues from anesthesia.   An yana-gastric tube was placed by anesthesia staff.  A radial artery catheter was placed by anesthesia staff for continuous blood pressure monitoring.  Please see the detailed anesthesia records.    The patient was then prepared and draped in a routing sterile fashion.  Seldinger access was obtained at the bilateral common femoral veins with 5 venipunctures.  J tip wires were advanced into the vascular space.  Short 11,8,6 Hungarian sheaths, an SL0 sheath and a deflectable sheath were placed into the bilateral common femoral veins and the inferior vena cava / right " atrium in an over the wire fashion.    The patient was anticoagulated with intravenous heparin (initial bolus and then continuous infusion) with a goal ACT of between 350 and 400 seconds.  A phased array ICE catheter was placed into the right atrium and right ventricle.  This was used for transeptal puncture, monitoring of the pericardial space, monitoring of the ablation catheter position within the heart and monitoring of other cardiac structures such as the left atrial appendage (to document the absence of thrombus), pulmonary veins, esophagus, mitral valve annulus and other cardiac structures.    Double transeptal puncture was performed after heparin administration with a combination of echocardiographic and fluoroscopic guidance.  This was performed with the long sheaths, a BRK needle, and a SafeSept transeptal wire.  Catheters and sheaths were advanced safely into the left atrium.  A 64 electrode Poolami diagnostic electrophysiology catheter and a [employer] irrigated tip ablation catheter were used for pacing and recording in the left atrium, left atrial appendage, pulmonary veins and left ventricle at various times throughout the procedure.  We used the [employer] 3D electroanatomic mapping system in conjunction with these catheters to construct an electroanatomic shell of the left atrium, left atrial appendage, mitral valve annulus, interatrial septum and pulmonary veins.  Left ventricular pacing and recording were performed by placing catheters safely and carefully across the mitral valve annulus to the left ventricle from the left atrium.  Adequate sensing and pacing thresholds were obtained from the left ventricle.  AV conduction ( antegrade ) as well as VA conduction ( retrograde ) were studied.  This was performed as a distinct addition to the diagnostic EP study.  Findings were conclusive for no accessory pathway and VA dissociation.    An EP study was performed.  Data obtained from this is listed  in the below table:    Initial Study    Isuprel Washout Study           drive train / burst extra    QRS 91    Rhythm          Atrial CL      R-R 845    Ventricular CL      AH 69    AVBCL      HV 38    AVNERP       drive train / burst extrastim   Slow Pway ERP      Rhythm     Fast Pway ERP      Atrial CL     VABCL conc? /  Dec?      Ventricular CL     VAERP      AVBCL 400    AERP      AVNERP 600 270   VERP      Slow Pway ERP     AP antegrade ERP      Fast Pway ERP     AP retrograde ERP      VABCL conc? /  Dec?           VAERP    Final Study      AERP      drive train / burst extrastim    VERP     Rhythm      AP antegrade ERP     Atrial CL      AP retrograde ERP     Ventricular CL           AVBCL     Isuprel Dose =      AVNERP       drive train / burst extrastim   Slow Pway ERP      Rhythm     Fast Pway ERP      Atrial CL     VABCL conc? /  Dec?      Ventricular CL     VAERP      AVBCL     AERP      AVNERP     VERP      Slow Pway ERP     AP antegrade ERP      Fast Pway ERP     AP retrograde ERP      VABCL conc? /  Dec?           VAERP           AERP           VERP           AP antegrade ERP           AP retrograde ERP                       Catheter ablation was performed to achieve pulmonary vein isolation.  A wide antral circumferential ablation approach was used.  Power was limited to 50 W on the posterior left atrium and 50 W elsewhere.  Lesions were made using proprietary local impedance technology.   Lesions were placed 1-3 cm away from the ostia of the pulmonary veins.  Additional lesions were given within the antral lesion set at the rhea between superior and inferior veins to achieve total isolation, when and where necessary.      The esophagus was protected with the [employer] esophageal cooling system.    After completing the antral ablation lesion set, I interrogated the pulmonary veins using and documented pulmonary vein isolation.    Adenosine 12 mg was administered intravenously following  ablation to test for other atrial and or ventricular arrhythmias.   Programmed stimulation was performed in an attempt to induce other and additional arrhythmias.  No arrhythmias were induced during administration and washout.    The ICE catheter revealed that there was no pericardial effusion.    Catheters and sheaths were then removed from the body.    Hemostasis was achieved with Vascade closure devices.    The patient was extubated in routine fashion and transferred to PACU in stable condition.    COMPLICATIONS: None    EBL: minimal    KEY PROCEDURAL FINDINGS:  Normal AV node and His-Purkinje system function  No left atrial myopathy  Wide antral circumferential pulmonary vein isolation of standard 4 vein pulmonary vein anatomy.  Documented entrance and exit block for all 4 veins.    POST PROCEDURAL PLAN:    Report was called the the PACU nurse responsible for the patients care.  Uninterrupted anticoagulation for not less than 90 days unless specially instructed otherwise by myself or another member of our EP physician team.  Please note that the patient and the patient’s family have been extensively counseled about this critical requirement and have agreed to comply.  Esophageal prophylaxis with proton pump inhibitor for 90 days.  Anticipate discharge this day.  Medications were reconciled, and key changes in medications include: Stop amiodarone  Patient and family instructed to call immediately for fever greater than 101.5 degrees F, hematemesis, increasing or severe chest pain, increasing shortness of breath, bleeding or other concerns.  Patient and family instructed that some chest discomfort is normal and is to be expected and that this should be expected to decrease over the first 3-4 days after the ablation procedure.  The patient will be seen at our office per routine follow up.      Ryan Shaikh DO, [facility], [facility]  Cardiac Electrophysiologist  [facility] / [facility]

## 2023-12-06 NOTE — ANESTHESIA PREPROCEDURE EVALUATION
Anesthesia Evaluation     Patient summary reviewed and Nursing notes reviewed   no history of anesthetic complications:   NPO Solid Status: > 8 hours  NPO Liquid Status: > 2 hours           Airway   Mallampati: II  TM distance: >3 FB  Neck ROM: full  No difficulty expected  Dental    (+) edentulous    Pulmonary - normal exam   (-) not a smoker  Cardiovascular     ECG reviewed  Patient on routine beta blocker and Beta blocker given within 24 hours of surgery  Rhythm: regular  Rate: normal    (+) hypertension, dysrhythmias Atrial Fib, CHF , hyperlipidemia  (-) murmur    ROS comment: 9/2023- lvef 41-45, no CORINNE thrombus, trace to mild AI, mild MR    Neuro/Psych  (+) tremors, psychiatric history Anxiety  (-) seizures, TIA  GI/Hepatic/Renal/Endo    (+) GERD, renal disease-    Musculoskeletal     Abdominal    Substance History   (+) alcohol use  (-) drug use     OB/GYN          Other        ROS/Med Hx Other: Hgb 13.2 k 4.2   Xarelto 12/5/23; amio 12/6/23, metoprolol 12/6/23  Bilateral tremors noted with finger-to-nose                    Anesthesia Plan    ASA 3     general     (Risks and benefits of general anesthesia discussed with patient (including MI, CVA, death, recall, aspiration, oropharyngeal/dental damage), questions answered, agreeable to proceed.    Clearsite HD monitoring)  intravenous induction     Anesthetic plan, risks, benefits, and alternatives have been provided, discussed and informed consent has been obtained with: patient.    Use of blood products discussed with patient  Consented to blood products.    Plan discussed with CRNA.    CODE STATUS:

## 2023-12-06 NOTE — ANESTHESIA POSTPROCEDURE EVALUATION
Patient: David Jean Baptiste    Procedure Summary       Date: 12/06/23 Room / Location: [facility]EP [facility] / [facility] EP INVASIVE LOCATION    Anesthesia Start: 0742 Anesthesia Stop: 0945    Procedure: Ablation atrial fibrillation; Rythmia; general anesthesia; DNS NOAC Diagnosis:       Paroxysmal atrial fibrillation      (afib)    Providers: Ryan Shaikh DO Provider: Sheila Pinzon DO    Anesthesia Type: general ASA Status: 3            Anesthesia Type: general    Vitals  Vitals Value Taken Time   /65 12/06/23 0945   Temp 97 °F (36.1 °C) 12/06/23 0945   Pulse 70 12/06/23 0945   Resp 16 12/06/23 0945   SpO2 100 % 12/06/23 0945           Post Anesthesia Care and Evaluation    Patient location during evaluation: PACU  Patient participation: waiting for patient participation  Level of consciousness: sleepy but conscious  Pain management: adequate    Airway patency: patent  Anesthetic complications: No anesthetic complications  PONV Status: none  Cardiovascular status: hemodynamically stable and acceptable  Respiratory status: nonlabored ventilation, acceptable and nasal cannula  Hydration status: acceptable

## 2023-12-06 NOTE — INTERVAL H&P NOTE
H&P reviewed. The patient was examined and there are no changes to the H&P.       EP Pre-Procedure Report  Cardiovascular Laboratory  [facility]    Patient:  David Jean Baptiste  :  1960    DATE: 2023      MEDICATIONS:  Prior to Admission medications    Medication Sig Start Date End Date Taking? Authorizing Provider   amiodarone (PACERONE) 200 MG tablet Take 1 tablet by mouth Daily. 23  Yes Ryan Shaikh DO   furosemide (LASIX) 40 MG tablet Take 1 tablet by mouth Daily. 23  Yes Ky Beltran MD   lisinopril (PRINIVIL,ZESTRIL) 5 MG tablet Take 1 tablet by mouth Daily. 23  Yes Roque Husain MD   lovastatin (ALTOPREV) 40 MG 24 hr tablet Take 1 tablet by mouth Every Night.   Yes Anastasia Pearson MD   metoprolol tartrate (LOPRESSOR) 25 MG tablet Take 1 tablet by mouth Every 12 (Twelve) Hours. 23  Yes Roque Husain MD   multivitamin (THERAGRAN) tablet tablet Take 1 tablet by mouth Daily.   Yes Anastasia Pearson MD   omeprazole (priLOSEC) 20 MG capsule Take 1 capsule by mouth Daily.   Yes Anastasia Pearson MD   potassium chloride (K-TAB) 20 MEQ tablet controlled-release ER tablet Take 1 tablet by mouth Daily. 23  Yes Ky Beltran MD   rivaroxaban (XARELTO) 20 MG tablet Take 1 tablet by mouth Daily With Dinner. Indications: Atrial Fibrillation 23  Yes Ky Beltran MD   sertraline (ZOLOFT) 100 MG tablet Take 1 tablet by mouth Daily.   Yes Anastasia Pearson MD   Turmeric 500 MG capsule Take 1 capsule by mouth Daily.   Yes Anastasia Pearson MD   vitamin B-12 (CYANOCOBALAMIN) 500 MCG tablet Take 1 tablet by mouth Daily. OTC   Yes Anastasia Pearson MD       Past medical & surgical history, social and family history reviewed in the electronic medical record.    Physical Exam:    Vitals:   Vitals:    23 0630   BP: 130/79   Pulse: 55   Resp: 14   Temp: 97.3 °F (36.3 °C)   SpO2: 98%          23  0630    Weight: 97.4 kg (214 lb 12.8 oz)   Body mass index is 26.15 kg/m².    GENERAL: No apparent distress.  No significant changes since last exam.  CHEST: Clear to auscultation bilaterally no stridor no wheeze.  CV: S1, S2, regular without Murmurs, Rubs or Gallops  EXTREMITIES: No edema.    Results from last 7 days   Lab Units 12/05/23  1037   SODIUM mmol/L 142   POTASSIUM mmol/L 4.2   CHLORIDE mmol/L 103   CO2 mmol/L 30.0*   BUN mg/dL 14   CREATININE mg/dL 1.02   GLUCOSE mg/dL 129*   CALCIUM mg/dL 9.4     Results from last 7 days   Lab Units 12/05/23  1037   WBC 10*3/mm3 4.13   HEMOGLOBIN g/dL 13.2   HEMATOCRIT % 41.0   PLATELETS 10*3/mm3 177       IMPRESSION:Cheif Complaint EP: Atrial Fibrillation      PLAN:  Procedure to perform: PVA        Electronically signed by BRODIE Whalen, 12/06/23, 7:25 AM EST.

## 2023-12-06 NOTE — ANESTHESIA PROCEDURE NOTES
Airway  Urgency: elective    Date/Time: 12/6/2023 7:56 AM  Airway not difficult    General Information and Staff    Patient location during procedure: OR    Indications and Patient Condition  Indications for airway management: airway protection    Preoxygenated: yes  MILS not maintained throughout  Mask difficulty assessment: 2 - vent by mask + OA or adjuvant +/- NMBA    Final Airway Details  Final airway type: endotracheal airway      Successful airway: ETT  Cuffed: yes   Successful intubation technique: video laryngoscopy  Facilitating devices/methods: intubating stylet and anterior pressure/BURP  Endotracheal tube insertion site: oral  Blade: Garay  Blade size: 4  ETT size (mm): 7.5  Cormack-Lehane Classification: grade IIb - view of arytenoids or posterior of glottis only  Placement verified by: chest auscultation and capnometry   Cuff volume (mL): 6  Measured from: lips  ETT/EBT  to lips (cm): 23  Number of attempts at approach: 1  Assessment: lips, teeth, and gum same as pre-op and atraumatic intubation    Additional Comments  Negative epigastric sounds, Breath sound equal bilaterally with symmetric chest rise and fall

## 2023-12-07 ENCOUNTER — CALL CENTER PROGRAMS (OUTPATIENT)
Dept: CALL CENTER | Facility: HOSPITAL | Age: 63
End: 2023-12-07
Payer: COMMERCIAL

## 2023-12-07 NOTE — OUTREACH NOTE
PCI/Device Survey      Flowsheet Row Responses   Facility patient discharged from? [place]   Procedure date 12/06/23   Procedure (if device, specify in description) Ablation  [bilateral groin]   Performing MD Dr. Ryan Shaikh   Attempt successful? Yes   Call start time 0941   Call end time 0945   Has the patient had any of the following symptoms since discharge? --  [denies--only c/o is sore throat]   Nursing interventions Patient education provided   Is the patient taking prescribed medications: --  [Pt takes Xarelto]   Nursing intervention Nurse provided patient education   Medication comments Pt has stopped Amiodarone   Does the patient have any of the following symptoms related to the cath/surgical site? --  [reviewed--denies issues or concerns]   Nursing intervention Patient education provided   Does the patient have an appointment scheduled with the cardiologist? Yes   Appointment comments Appts in place   Did the patient feel prepared to go home on the same day as the procedure? Yes   Is the patient satisfied with the same day discharge process? Yes   PCI/Device call completed Yes   Wrap up additional comments Pt has no questions or concerns today--            SUMAN LEMORE - Registered Nurse

## 2023-12-08 RX ORDER — CEFAZOLIN SODIUM 1 G/3ML
INJECTION, POWDER, FOR SOLUTION INTRAMUSCULAR; INTRAVENOUS AS NEEDED
Status: DISCONTINUED | OUTPATIENT
Start: 2023-12-06 | End: 2023-12-08 | Stop reason: SURG

## 2024-01-09 ENCOUNTER — OFFICE VISIT (OUTPATIENT)
Dept: CARDIOLOGY | Facility: HOSPITAL | Age: 64
End: 2024-01-09
Payer: COMMERCIAL

## 2024-01-09 ENCOUNTER — HOSPITAL ENCOUNTER (OUTPATIENT)
Dept: CARDIOLOGY | Facility: HOSPITAL | Age: 64
Discharge: HOME | End: 2024-01-09
Payer: COMMERCIAL

## 2024-01-09 VITALS
BODY MASS INDEX: 26.67 KG/M2 | HEIGHT: 76 IN | RESPIRATION RATE: 18 BRPM | DIASTOLIC BLOOD PRESSURE: 73 MMHG | HEART RATE: 72 BPM | OXYGEN SATURATION: 99 % | SYSTOLIC BLOOD PRESSURE: 111 MMHG | WEIGHT: 219.06 LBS | TEMPERATURE: 98.1 F

## 2024-01-09 DIAGNOSIS — I48.0 PAROXYSMAL ATRIAL FIBRILLATION: ICD-10-CM

## 2024-01-09 DIAGNOSIS — I10 PRIMARY HYPERTENSION: ICD-10-CM

## 2024-01-09 DIAGNOSIS — Z79.01 CHRONIC ANTICOAGULATION: ICD-10-CM

## 2024-01-09 DIAGNOSIS — I48.0 PAROXYSMAL ATRIAL FIBRILLATION: Primary | ICD-10-CM

## 2024-01-09 PROCEDURE — 93005 ELECTROCARDIOGRAM TRACING: CPT | Performed by: NURSE PRACTITIONER

## 2024-01-09 NOTE — PROGRESS NOTES
"Chief Complaint  Atrial Fibrillation and Follow-up (Post pva)    Subjective    History of Present Illness {CC  Problem List  Visit  Diagnosis   Encounters  Notes  Medications  Labs  Result Review Imaging  Media :23}       History of Present Illness   63-year-old male presents the office today for ongoing evaluation of his paroxysmal atrial fibrillation.  He underwent a PVA per Dr. Shaikh on 12/6/2023.  He does report brief intermittent episodes of atrial fibrillation over the last month and notes he has not had any A-fib since Friday.  Does report ongoing fatigue but reports that his dyspnea on exertion has improved dramatically.  Patient also denies dizziness and lightheadedness.  History of anxiety, osteoarthritis, coarse tremors, GERD, migraines, hyperlipidemia  Objective     Vital Signs:   Vitals:    01/09/24 1305   BP: 111/73   BP Location: Left arm   Patient Position: Sitting   Cuff Size: Adult   Pulse: 72   Resp: 18   Temp: 98.1 °F (36.7 °C)   TempSrc: Temporal   SpO2: 99%   Weight: 99.4 kg (219 lb 1 oz)   Height: 193 cm (76\")     Body mass index is 26.67 kg/m².  Physical Exam  Vitals and nursing note reviewed.   Constitutional:       Appearance: Normal appearance.   HENT:      Head: Normocephalic.   Eyes:      Pupils: Pupils are equal, round, and reactive to light.   Cardiovascular:      Rate and Rhythm: Normal rate and regular rhythm.      Pulses: Normal pulses.      Heart sounds: Normal heart sounds. No murmur heard.  Pulmonary:      Effort: Pulmonary effort is normal.      Breath sounds: Normal breath sounds.   Abdominal:      General: Bowel sounds are normal.      Palpations: Abdomen is soft.   Musculoskeletal:         General: Normal range of motion.      Cervical back: Normal range of motion.      Right lower leg: No edema.      Left lower leg: No edema.   Skin:     General: Skin is warm and dry.      Capillary Refill: Capillary refill takes less than 2 seconds.   Neurological:      Mental " Status: He is alert and oriented to person, place, and time.   Psychiatric:         Mood and Affect: Mood normal.         Thought Content: Thought content normal.              Result Review  Data Reviewed:{ Labs  Result Review  Imaging  Med Tab  Media :23}     EKG today normal sinus rhythm, cannot rule out anterior infarct at 72 bpm  CBC (No Diff) (12/05/2023 10:37)  Basic Metabolic Panel (12/05/2023 10:37)         Assessment and Plan {CC Problem List  Visit Diagnosis  ROS  Review (Popup)  Health Maintenance  Quality  BestPractice  Medications  SmartSets  SnapShot Encounters  Media :23}   1. Paroxysmal atrial fibrillation  S/p pva  Continue on lopressor, xarelto  CHADS-VASc Risk Assessment              1 Total Score    1 Hypertension        Criteria that do not apply:    CHF    Age >/= 75    DM    PRIOR STROKE/TIA/THROMBO    Vascular Disease    Age 65-74    Sex: Female            - ECG 12 Lead; Future    2. Primary hypertension  Stable on lisinopril     3. Chronic anticoagulation  On eliquis and denies any s/s of bleeding           Follow Up {Instructions Charge Capture  Follow-up Communications :23}   Return if symptoms worsen or fail to improve.    Patient was given instructions and counseling regarding his condition or for health maintenance advice. Please see specific information pulled into the AVS if appropriate.  Patient was instructed to call the [facility] with any questions, concerns, or worsening symptoms.

## 2024-01-11 LAB
QT INTERVAL: 380 MS
QTC INTERVAL: 416 MS

## 2024-02-05 NOTE — PROGRESS NOTES
[facility]  Follow Up Visit  David Jean Baptiste  1960    VISIT DATE:  02/06/24    PCP:   Deniz Chang MD  [address] [address]          CC:  Paroxysmal atrial fibrillation (2-Mo HFU post-ABL)      Problem List:  Atrial fibrillation  Underwent ablation December 2023  ARIANE September 2023: EF 41 to 45% while in A-fib    History of Present Illness:  David Jean Baptiste  Is a 63 y.o. male with pertinent cardiac history detailed above.  Patient underwent ablation in December.  He does have intermittent episodes of atrial fibrillation.  They can sometimes last a couple of hours.  He is in sinus rhythm today.  He had some bruising at one of his access sites that he states is now resolved.  No lower extremity edema.  No additional cardiac complaints.  Tolerating all medications well.      Patient Active Problem List    Diagnosis Date Noted    Chronic anticoagulation 11/09/2023    Long term current use of antiarrhythmic drug 11/09/2023    Primary hypertension 11/09/2023    Chronic systolic heart failure 09/04/2023    Paroxysmal atrial fibrillation 09/01/2023     Note Last Updated: 11/9/2023     [id number] 2  ARIANE with ECV, 9-23  ECV, 9/22/2023         No Known Allergies    Social History     Socioeconomic History    Marital status:    Tobacco Use    Smoking status: Never     Passive exposure: Never    Smokeless tobacco: Never   Vaping Use    Vaping Use: Never used   Substance and Sexual Activity    Alcohol use: Not Currently     Comment: rare    Drug use: No    Sexual activity: Not Currently     Partners: Female     Birth control/protection: Condom, None, Hysterectomy       Family History   Problem Relation Age of Onset    Dementia Mother     No Known Problems Father     Asthma Brother        Current Medications:    Current Outpatient Medications:     furosemide (LASIX) 40 MG tablet, Take 1 tablet by mouth Daily., Disp: 30 tablet, Rfl: 6    lisinopril  "(PRINIVIL,ZESTRIL) 5 MG tablet, Take 1 tablet by mouth Daily., Disp: 90 tablet, Rfl: 1    lovastatin (MEVACOR) 40 MG tablet, , Disp: , Rfl:     metoprolol tartrate (LOPRESSOR) 25 MG tablet, Take 1 tablet by mouth Every 12 (Twelve) Hours., Disp: 180 tablet, Rfl: 1    multivitamin (THERAGRAN) tablet tablet, Take 1 tablet by mouth Daily., Disp: , Rfl:     omeprazole (priLOSEC) 20 MG capsule, Take 1 capsule by mouth Daily., Disp: , Rfl:     potassium chloride (K-TAB) 20 MEQ tablet controlled-release ER tablet, Take 1 tablet by mouth Daily., Disp: 30 tablet, Rfl: 6    rivaroxaban (XARELTO) 20 MG tablet, Take 1 tablet by mouth Daily With Dinner. Indications: Atrial Fibrillation, Disp: 30 tablet, Rfl: 6    sertraline (ZOLOFT) 100 MG tablet, Take 1 tablet by mouth Daily., Disp: , Rfl:     Turmeric 500 MG capsule, Take 1 capsule by mouth Daily., Disp: , Rfl:     vitamin B-12 (CYANOCOBALAMIN) 500 MCG tablet, Take 1 tablet by mouth Daily. OTC, Disp: , Rfl:     lovastatin (ALTOPREV) 40 MG 24 hr tablet, Take 1 tablet by mouth Every Night. (Patient not taking: Reported on 2/6/2024), Disp: , Rfl:      Review of Systems   Cardiovascular:  Positive for irregular heartbeat and palpitations.   Respiratory:  Negative for shortness of breath.        Vitals:    02/06/24 1048   BP: 126/62   BP Location: Right arm   Patient Position: Sitting   Cuff Size: Adult   Pulse: 81   SpO2: 97%   Weight: 100 kg (220 lb 9.6 oz)   Height: 193 cm (76\")       Physical Exam  Constitutional:       Appearance: Normal appearance.   Cardiovascular:      Rate and Rhythm: Normal rate and regular rhythm.   Pulmonary:      Effort: Pulmonary effort is normal.      Breath sounds: Normal breath sounds.   Musculoskeletal:      Right lower leg: No edema.      Left lower leg: No edema.   Neurological:      Mental Status: He is alert.         Diagnostic Data:    ECG 12 Lead    Date/Time: 2/6/2024 10:55 AM  Performed by: Roque Husain MD    Authorized by: " "Rodrigue, Roque Ornelas MD  Comparison: compared with previous ECG from 1/9/2024  Rhythm: sinus rhythm  Rate: normal  BPM: 81  QRS axis: normal    Clinical impression: normal ECG        No results found for: \"CHLPL\", \"TRIG\", \"HDL\", \"LDLDIRECT\"  Lab Results   Component Value Date    GLUCOSE 129 (H) 12/05/2023    BUN 14 12/05/2023    CREATININE 1.02 12/05/2023     12/05/2023    K 4.2 12/05/2023     12/05/2023    CO2 30.0 (H) 12/05/2023     Lab Results   Component Value Date    HGBA1C 5.80 (H) 09/01/2023     Lab Results   Component Value Date    WBC 4.13 12/05/2023    HGB 13.2 12/05/2023    HCT 41.0 12/05/2023     12/05/2023       Assessment:   Diagnosis Plan   1. Paroxysmal atrial fibrillation  ECG 12 Lead    Adult Transthoracic Echo Limited W/ Cont if Necessary Per Protocol          Plan:    Atrial fibrillation status post ablation  Repeat echo to assess EF in sinus rhythm ordered  Current medications metoprolol and Xarelto  Having some paroxysms of A-fib.  Still remains in the \"blanking period\" after ablation.  Discussed it can take a few months for full ablation flex to take hold.  Advised he could take an additional 25 mg metoprolol if he feels a sustained episode  Keep scheduled EP follow-up as an March and April  Hypertension  Controlled, no changes made  Hyperlipidemia  Lovastatin 40 mg    Follow-up in about 9 months    ACP discussion was held with the patient during this visit. Patient has an advance directive in EMR which is still valid.     Roque Husain MD FACC     "

## 2024-02-06 ENCOUNTER — OFFICE VISIT (OUTPATIENT)
Dept: CARDIOLOGY | Facility: CLINIC | Age: 64
End: 2024-02-06
Payer: COMMERCIAL

## 2024-02-06 VITALS
HEART RATE: 81 BPM | WEIGHT: 220.6 LBS | DIASTOLIC BLOOD PRESSURE: 62 MMHG | SYSTOLIC BLOOD PRESSURE: 126 MMHG | HEIGHT: 76 IN | BODY MASS INDEX: 26.86 KG/M2 | OXYGEN SATURATION: 97 %

## 2024-02-06 DIAGNOSIS — I48.0 PAROXYSMAL ATRIAL FIBRILLATION: Primary | ICD-10-CM

## 2024-02-06 PROCEDURE — 93000 ELECTROCARDIOGRAM COMPLETE: CPT | Performed by: INTERNAL MEDICINE

## 2024-02-06 PROCEDURE — 99214 OFFICE O/P EST MOD 30 MIN: CPT | Performed by: INTERNAL MEDICINE

## 2024-02-06 RX ORDER — LOVASTATIN 40 MG/1
TABLET ORAL
COMMUNITY
Start: 2024-01-31

## 2024-02-21 ENCOUNTER — HOSPITAL ENCOUNTER (OUTPATIENT)
Dept: CARDIOLOGY | Facility: HOSPITAL | Age: 64
Discharge: HOME | End: 2024-02-21
Admitting: INTERNAL MEDICINE
Payer: COMMERCIAL

## 2024-02-21 VITALS
SYSTOLIC BLOOD PRESSURE: 126 MMHG | HEIGHT: 76 IN | DIASTOLIC BLOOD PRESSURE: 62 MMHG | WEIGHT: 220 LBS | BODY MASS INDEX: 26.79 KG/M2

## 2024-02-21 DIAGNOSIS — I48.0 PAROXYSMAL ATRIAL FIBRILLATION: ICD-10-CM

## 2024-02-21 LAB
BH CV ECHO MEAS - EDV(CUBED): 117.6 ML
BH CV ECHO MEAS - EDV(MOD-SP2): 123 ML
BH CV ECHO MEAS - EDV(MOD-SP4): 108 ML
BH CV ECHO MEAS - EF(MOD-SP2): 45.4 %
BH CV ECHO MEAS - EF(MOD-SP4): 44.8 %
BH CV ECHO MEAS - ESV(CUBED): 26.8 ML
BH CV ECHO MEAS - ESV(MOD-SP2): 67.1 ML
BH CV ECHO MEAS - ESV(MOD-SP4): 59.6 ML
BH CV ECHO MEAS - FS: 38.9 %
BH CV ECHO MEAS - IVS/LVPW: 1 CM
BH CV ECHO MEAS - IVSD: 1 CM
BH CV ECHO MEAS - LV DIASTOLIC VOL/BSA (35-75): 46.8 CM2
BH CV ECHO MEAS - LV MASS(C)D: 176 GRAMS
BH CV ECHO MEAS - LV SYSTOLIC VOL/BSA (12-30): 25.8 CM2
BH CV ECHO MEAS - LVIDD: 4.9 CM
BH CV ECHO MEAS - LVIDS: 3 CM
BH CV ECHO MEAS - LVPWD: 1 CM
BH CV ECHO MEAS - SI(MOD-SP2): 24.2 ML/M2
BH CV ECHO MEAS - SI(MOD-SP4): 21 ML/M2
BH CV ECHO MEAS - SV(MOD-SP2): 55.9 ML
BH CV ECHO MEAS - SV(MOD-SP4): 48.4 ML

## 2024-02-21 PROCEDURE — 93308 TTE F-UP OR LMTD: CPT

## 2024-02-21 PROCEDURE — 93308 TTE F-UP OR LMTD: CPT | Performed by: INTERNAL MEDICINE

## 2024-02-22 ENCOUNTER — TELEPHONE (OUTPATIENT)
Dept: CARDIOLOGY | Facility: CLINIC | Age: 64
End: 2024-02-22
Payer: COMMERCIAL

## 2024-02-22 NOTE — TELEPHONE ENCOUNTER
----- Message from Roque Husain MD sent at 2/22/2024  8:19 AM EST -----  Can we let patient know that his ejection fraction has improved and is now in the normal range.  No additional changes needed at this time

## 2024-03-04 ENCOUNTER — OFFICE VISIT (OUTPATIENT)
Dept: CARDIOLOGY | Facility: CLINIC | Age: 64
End: 2024-03-04
Payer: COMMERCIAL

## 2024-03-04 VITALS
OXYGEN SATURATION: 95 % | SYSTOLIC BLOOD PRESSURE: 116 MMHG | DIASTOLIC BLOOD PRESSURE: 64 MMHG | HEIGHT: 76 IN | WEIGHT: 221.8 LBS | BODY MASS INDEX: 27.01 KG/M2 | HEART RATE: 64 BPM

## 2024-03-04 DIAGNOSIS — Z79.01 CHRONIC ANTICOAGULATION: ICD-10-CM

## 2024-03-04 DIAGNOSIS — I10 PRIMARY HYPERTENSION: ICD-10-CM

## 2024-03-04 DIAGNOSIS — I48.0 PAROXYSMAL ATRIAL FIBRILLATION: Primary | ICD-10-CM

## 2024-03-04 PROBLEM — Z79.899 LONG TERM CURRENT USE OF ANTIARRHYTHMIC DRUG: Status: RESOLVED | Noted: 2023-11-09 | Resolved: 2024-03-04

## 2024-03-04 PROCEDURE — 99213 OFFICE O/P EST LOW 20 MIN: CPT | Performed by: PHYSICIAN ASSISTANT

## 2024-03-04 RX ORDER — LISINOPRIL 5 MG/1
5 TABLET ORAL DAILY
Qty: 90 TABLET | Refills: 1 | Status: SHIPPED | OUTPATIENT
Start: 2024-03-04

## 2024-03-04 RX ORDER — POTASSIUM CHLORIDE 1500 MG/1
20 TABLET, EXTENDED RELEASE ORAL DAILY
Qty: 30 TABLET | Refills: 6 | Status: SHIPPED | OUTPATIENT
Start: 2024-03-04

## 2024-03-04 RX ORDER — FUROSEMIDE 40 MG/1
40 TABLET ORAL DAILY
Qty: 30 TABLET | Refills: 6 | Status: SHIPPED | OUTPATIENT
Start: 2024-03-04

## 2024-03-04 NOTE — PROGRESS NOTES
Encounter Date:03/04/2024      Patient ID: David Jean Baptiste is a 63 y.o. male.    Deniz Chang MD    Cheif Complaint EP: Atrial Fibrillation    PROBLEM LIST:  Patient Active Problem List    Diagnosis Date Noted    Paroxysmal atrial fibrillation 09/01/2023     Priority: High     Note Last Updated: 3/4/2024     [id number] 2  ARIANE with ECV, 9-23  ECV, 9/22/2023  Catheter ablation for atrial fibrillation (PVI) 12/6/2023      Chronic systolic heart failure 09/04/2023     Priority: Medium     Note Last Updated: 3/4/2024     Transesophageal echo, 9/2/2023: Left ventricular systolic function is mildly decreased. Left ventricular ejection fraction appears to be 41 - 45%.   Echo, 2/21/2024: Left ventricular systolic function is normal. Left ventricular ejection fraction appears to be 56 - 60%.       Chronic anticoagulation 11/09/2023     Priority: Low    Primary hypertension 11/09/2023     Priority: Low               History of Present Illness  Patient presents today for follow-up with a history of paroxysmal atrial fibrillation status post ablation on long-term anticoagulation.  He returns today for initial EP follow-up after pulmonary vein isolation.  He continues to complain of recurrent episodes of atrial fibrillation.  Last episode was 2 to 3 days ago.  The sometimes last 1 to 2 hours.  If they persist he takes an extra metoprolol 25 mg which resolves his symptoms within an hour.  He does not check his blood pressure at home.  He states compliance with current medical regimen reports no significant adverse side effects.    No Known Allergies    Current Outpatient Medications   Medication Instructions    furosemide (LASIX) 40 mg, Oral, Daily    lisinopril (PRINIVIL,ZESTRIL) 5 mg, Oral, Daily    lovastatin (ALTOPREV) 40 mg, Oral, Nightly    metoprolol tartrate (LOPRESSOR) 25 mg, Oral, Every 12 Hours Scheduled    Multiple Vitamins-Minerals (PRESERVISION AREDS 2 PO) 2 tablets, Oral, Daily    multivitamin  "(THERAGRAN) tablet tablet 1 tablet, Oral, Daily    omeprazole (PRILOSEC) 20 mg, Oral, Daily    potassium chloride (K-TAB) 20 MEQ tablet controlled-release ER tablet 20 mEq, Oral, Daily    rivaroxaban (XARELTO) 20 mg, Oral, Daily With Dinner    sertraline (ZOLOFT) 100 mg, Oral, Daily    Turmeric 500 MG capsule 1 capsule, Oral, Daily    vitamin B-12 (CYANOCOBALAMIN) 500 mcg, Oral, Daily, OTC       .    Objective:     /64 (BP Location: Left arm, Patient Position: Sitting)   Pulse 64   Ht 193 cm (76\")   Wt 101 kg (221 lb 12.8 oz)   SpO2 95%   BMI 27.00 kg/m²    Body mass index is 27 kg/m².     Constitutional:       Appearance: Well-developed.   Pulmonary:      Effort: Pulmonary effort is normal. No respiratory distress.      Breath sounds: Normal breath sounds. No wheezing. No rales.      Comments: Bases clear  Chest:      Chest wall: Not tender to palpatation.   Cardiovascular:      Normal rate. Regular rhythm.      Murmurs: There is no murmur.      No gallop.  No click. No rub.   Pulses:     Intact distal pulses.   Edema:     Peripheral edema absent.   Musculoskeletal: Normal range of motion.       Lab Review:     Lab Results   Component Value Date    GLUCOSE 129 (H) 12/05/2023    BUN 14 12/05/2023    CREATININE 1.02 12/05/2023    EGFR 82.6 12/05/2023    BCR 13.7 12/05/2023    K 4.2 12/05/2023    CO2 30.0 (H) 12/05/2023    CALCIUM 9.4 12/05/2023    ALBUMIN 4.5 10/10/2023    BILITOT 0.6 10/10/2023    AST 30 10/10/2023    ALT 31 10/10/2023     Lab Results   Component Value Date    WBC 4.13 12/05/2023    HGB 13.2 12/05/2023    HCT 41.0 12/05/2023    MCV 90.7 12/05/2023     12/05/2023     Lab Results   Component Value Date    TSH 4.160 10/10/2023           Procedures               Assessment:      Diagnosis Plan   1. Paroxysmal atrial fibrillation  Holter Monitor - 72 Hour Up To 15 Days.       2. Chronic anticoagulation  Refilled Xarelto at current dose      3. Primary hypertension  Well-managed, " refilled Lasix, potassium, metoprolol, lisinopril at their current doses        Plan:     Stable cardiac status.  Continue current medications.   in 2 months, sooner as needed.  Thank you for allowing us to participate in the care of your patient.     Electronically signed by BRODIE Whalen, 03/04/24, 11:02 AM EST.

## 2024-03-06 ENCOUNTER — OFFICE VISIT (OUTPATIENT)
Dept: NEUROLOGY | Facility: CLINIC | Age: 64
End: 2024-03-06
Payer: COMMERCIAL

## 2024-03-06 VITALS
HEART RATE: 77 BPM | BODY MASS INDEX: 27.11 KG/M2 | DIASTOLIC BLOOD PRESSURE: 86 MMHG | HEIGHT: 76 IN | OXYGEN SATURATION: 97 % | WEIGHT: 222.66 LBS | SYSTOLIC BLOOD PRESSURE: 128 MMHG

## 2024-03-06 DIAGNOSIS — R25.1 TREMOR: Primary | ICD-10-CM

## 2024-03-06 PROCEDURE — 99204 OFFICE O/P NEW MOD 45 MIN: CPT | Performed by: PSYCHIATRY & NEUROLOGY

## 2024-03-06 RX ORDER — PRIMIDONE 50 MG/1
200 TABLET ORAL NIGHTLY
Qty: 120 TABLET | Refills: 3 | Status: SHIPPED | OUTPATIENT
Start: 2024-03-06 | End: 2024-04-05

## 2024-03-06 NOTE — PROGRESS NOTES
Subjective:    CC: David Jean Baptiste is seen today in consultation at the request of Deniz Chang MD for Tremors       HPI:  Patient is a 63-year-old male with past medical history of hypertension, hyperlipidemia, paroxysmal A-fib on Xarelto referred to the clinic for evaluation of tremors.he reports that he has had tremors for over 40 years and it has slowly and progressively become worse.  He reports tremors while holding cup of coffee or glass of water.  He also notes difficulty using spoon and fork.  He also finds it difficult to write because of tremors.  He reports family history of tremors in his father.  He denies any resting tremors.  Denies any problems with walking or balance.  He has never tried any medications to help reduce intensity or frequency of these tremors.  He reports that tremors usually gets worse when he is anxious or stressed.    The following portions of the patient's history were reviewed today and updated as of 03/06/202 4  : allergies, social history, and problem list.  This document will be scanned to patient's chart.      Current Outpatient Medications:     furosemide (LASIX) 40 MG tablet, Take 1 tablet by mouth Daily., Disp: 30 tablet, Rfl: 6    lisinopril (PRINIVIL,ZESTRIL) 5 MG tablet, Take 1 tablet by mouth Daily., Disp: 90 tablet, Rfl: 1    lovastatin (ALTOPREV) 40 MG 24 hr tablet, Take 1 tablet by mouth Every Night., Disp: , Rfl:     metoprolol tartrate (LOPRESSOR) 25 MG tablet, Take 1 tablet by mouth Every 12 (Twelve) Hours., Disp: 180 tablet, Rfl: 3    Multiple Vitamins-Minerals (PRESERVISION AREDS 2 PO), Take 2 tablets by mouth Daily., Disp: , Rfl:     multivitamin (THERAGRAN) tablet tablet, Take 1 tablet by mouth Daily., Disp: , Rfl:     omeprazole (priLOSEC) 20 MG capsule, Take 1 capsule by mouth Daily., Disp: , Rfl:     potassium chloride ER (K-TAB) 20 MEQ tablet controlled-release ER tablet, Take 1 tablet by mouth Daily., Disp: 30 tablet, Rfl: 6    rivaroxaban  "(XARELTO) 20 MG tablet, Take 1 tablet by mouth Daily With Dinner. Indications: Atrial Fibrillation, Disp: 30 tablet, Rfl: 6    sertraline (ZOLOFT) 100 MG tablet, Take 1 tablet by mouth Daily., Disp: , Rfl:     Turmeric 500 MG capsule, Take 1 capsule by mouth Daily., Disp: , Rfl:     vitamin B-12 (CYANOCOBALAMIN) 500 MCG tablet, Take 1 tablet by mouth Daily. OTC, Disp: , Rfl:     primidone (MYSOLINE) 50 MG tablet, Take 4 tablets by mouth Every Night for 30 days., Disp: 120 tablet, Rfl: 3   Past Medical History:   Diagnosis Date    Abnormal ECG 9/1/23    Anxiety     Arthritis     Atrial fibrillation     Coarse tremors     GERD (gastroesophageal reflux disease)     History of migraine     Hyperlipidemia     Hypertension 9/1/23    Renal disorder     Wears dentures     full plate upper , lower, removable implant    Wears glasses       Past Surgical History:   Procedure Laterality Date    ABLATION OF DYSRHYTHMIC FOCUS  12/6/23    CARDIAC ELECTROPHYSIOLOGY PROCEDURE N/A 12/06/2023    Procedure: Ablation atrial fibrillation; Rythmia; general anesthesia; DNS NOAC;  Surgeon: Ryan Shaikh DO;  Location: [facility] INVASIVE LOCATION;  Service: Cardiovascular;  Laterality: N/A;    CARDIOVERSION      CIRCUMCISION      COLONOSCOPY      with polypectomy    DENTAL PROCEDURE Bilateral     implants    EYE SURGERY Bilateral     cataract      Family History   Problem Relation Age of Onset    Dementia Mother     No Known Problems Father     Asthma Brother       Review of Systems    All other systems reviewed and are negative     Objective:    /86   Pulse 77   Ht 193 cm (75.98\")   Wt 101 kg (222 lb 10.6 oz)   SpO2 97%   BMI 27.11 kg/m²     Neurology Exam:    General apperance: NAD.     Mental status: Alert, awake and oriented to time place and person.    Language and Speech: No aphasia or dysarthria.    Naming , Repitition and Comprehension:  Can name objects, repeat a sentence and follow commands. Speech is clear and fluent " with good repetition, comprehension, and naming.    Cranial Nerves:   CN II: Visual fields are full. Intact. Fundi - Normal, No papillederma, Pupils - RAFA  CN III, IV and VI: Extraocular movements are intact. Normal saccades.   CN V: Facial sensation is intact.   CN VII: Muscles of facial expression reveal no asymmetry. Intact.   CN VIII: Hearing is intact. Whispered voice intact.   CN IX and X: Palate elevates symmetrically. Intact  CN XI: Shoulder shrug is intact.   CN XII: Tongue is midline without evidence of atrophy or fasciculation.     Motor:  Right UE muscle strength 5/5. Normal tone.     Left UE muscle strength 5/5. Normal tone.      Right LE muscle strength5/5. Normal tone.     Left LE muscle strength 5/5. Normal tone.      Sensory: Normal light touch, vibration and pinprick sensation bilaterally.    DTRs: 2+ bilaterally in upper and lower extremities.    Babinski: Negative bilaterally.    Co-ordination: Normal finger-to-nose, heel to shin B/L.    Moderate to severe intensity action and postural tremors noted.    Rhomberg: Negative.    Gait: Normal.    Cardiovascular: Regular rate and rhythm without murmur, gallop or rub.    Assessment and Plan:  1. Tremor  Benign essential tremors.  Patient with history of tremors that started over 40 years ago.  It has progressively become worse in intensity and frequency.  He has never tried any medications in the past.  He is currently on 2 different antihypertensive so propranolol cannot be considered I will be starting him on primidone 50 mg at bedtime slowly increasing to 200 mg bedtime for tremors.  Based on the response, further dose adjustment will be made.  I will plan to see him back in clinic in 2 months for follow-up.       Return in about 2 months (around 5/6/2024).     Prosper Padilla MD      Note to patient: The 21st Century Cures Act makes medical notes like these available to patients in the interest of transparency. However, be advised this is a medical  document. It is intended as peer to peer communication. It is written in medical language and may contain abbreviations or verbiage that are unfamiliar. It may appear blunt or direct. Medical documents are intended to carry relevant information, facts as evident, and the clinical opinion of the physician.

## 2024-05-06 ENCOUNTER — TELEPHONE (OUTPATIENT)
Dept: CARDIOLOGY | Facility: CLINIC | Age: 64
End: 2024-05-06

## 2024-05-06 ENCOUNTER — HOSPITAL ENCOUNTER (EMERGENCY)
Facility: HOSPITAL | Age: 64
Discharge: HOME | End: 2024-05-06
Attending: EMERGENCY MEDICINE | Admitting: EMERGENCY MEDICINE
Payer: COMMERCIAL

## 2024-05-06 ENCOUNTER — APPOINTMENT (OUTPATIENT)
Dept: CT IMAGING | Facility: HOSPITAL | Age: 64
End: 2024-05-06
Payer: COMMERCIAL

## 2024-05-06 ENCOUNTER — APPOINTMENT (OUTPATIENT)
Dept: GENERAL RADIOLOGY | Facility: HOSPITAL | Age: 64
End: 2024-05-06
Payer: COMMERCIAL

## 2024-05-06 VITALS
HEIGHT: 76 IN | DIASTOLIC BLOOD PRESSURE: 82 MMHG | TEMPERATURE: 98.1 F | WEIGHT: 220 LBS | SYSTOLIC BLOOD PRESSURE: 114 MMHG | BODY MASS INDEX: 26.79 KG/M2 | OXYGEN SATURATION: 98 % | RESPIRATION RATE: 18 BRPM | HEART RATE: 83 BPM

## 2024-05-06 DIAGNOSIS — R55 VASOVAGAL SYNCOPE: Primary | ICD-10-CM

## 2024-05-06 DIAGNOSIS — R93.89 ABNORMAL CHEST X-RAY: ICD-10-CM

## 2024-05-06 DIAGNOSIS — I48.0 PAROXYSMAL A-FIB: ICD-10-CM

## 2024-05-06 LAB
ALBUMIN SERPL-MCNC: 4.1 G/DL (ref 3.5–5.2)
ALBUMIN/GLOB SERPL: 1.6 G/DL
ALP SERPL-CCNC: 103 U/L (ref 39–117)
ALT SERPL W P-5'-P-CCNC: 20 U/L (ref 1–41)
ANION GAP SERPL CALCULATED.3IONS-SCNC: 9 MMOL/L (ref 5–15)
AST SERPL-CCNC: 26 U/L (ref 1–40)
BASOPHILS # BLD AUTO: 0.03 10*3/MM3 (ref 0–0.2)
BASOPHILS NFR BLD AUTO: 0.5 % (ref 0–1.5)
BILIRUB SERPL-MCNC: 0.3 MG/DL (ref 0–1.2)
BUN SERPL-MCNC: 13 MG/DL (ref 8–23)
BUN/CREAT SERPL: 13.4 (ref 7–25)
CALCIUM SPEC-SCNC: 9.3 MG/DL (ref 8.6–10.5)
CHLORIDE SERPL-SCNC: 100 MMOL/L (ref 98–107)
CO2 SERPL-SCNC: 29 MMOL/L (ref 22–29)
CREAT SERPL-MCNC: 0.97 MG/DL (ref 0.76–1.27)
DEPRECATED RDW RBC AUTO: 41.1 FL (ref 37–54)
EGFRCR SERPLBLD CKD-EPI 2021: 87.7 ML/MIN/1.73
EOSINOPHIL # BLD AUTO: 0.11 10*3/MM3 (ref 0–0.4)
EOSINOPHIL NFR BLD AUTO: 1.9 % (ref 0.3–6.2)
ERYTHROCYTE [DISTWIDTH] IN BLOOD BY AUTOMATED COUNT: 12.4 % (ref 12.3–15.4)
GLOBULIN UR ELPH-MCNC: 2.6 GM/DL
GLUCOSE BLDC GLUCOMTR-MCNC: 102 MG/DL (ref 70–130)
GLUCOSE SERPL-MCNC: 112 MG/DL (ref 65–99)
HCT VFR BLD AUTO: 43.9 % (ref 37.5–51)
HGB BLD-MCNC: 14.3 G/DL (ref 13–17.7)
HOLD SPECIMEN: NORMAL
IMM GRANULOCYTES # BLD AUTO: 0.04 10*3/MM3 (ref 0–0.05)
IMM GRANULOCYTES NFR BLD AUTO: 0.7 % (ref 0–0.5)
LYMPHOCYTES # BLD AUTO: 1.91 10*3/MM3 (ref 0.7–3.1)
LYMPHOCYTES NFR BLD AUTO: 32.4 % (ref 19.6–45.3)
MAGNESIUM SERPL-MCNC: 2.2 MG/DL (ref 1.6–2.4)
MCH RBC QN AUTO: 29.7 PG (ref 26.6–33)
MCHC RBC AUTO-ENTMCNC: 32.6 G/DL (ref 31.5–35.7)
MCV RBC AUTO: 91.1 FL (ref 79–97)
MONOCYTES # BLD AUTO: 0.37 10*3/MM3 (ref 0.1–0.9)
MONOCYTES NFR BLD AUTO: 6.3 % (ref 5–12)
NEUTROPHILS NFR BLD AUTO: 3.43 10*3/MM3 (ref 1.7–7)
NEUTROPHILS NFR BLD AUTO: 58.2 % (ref 42.7–76)
NRBC BLD AUTO-RTO: 0 /100 WBC (ref 0–0.2)
PLATELET # BLD AUTO: 203 10*3/MM3 (ref 140–450)
PMV BLD AUTO: 9.8 FL (ref 6–12)
POTASSIUM SERPL-SCNC: 4.5 MMOL/L (ref 3.5–5.2)
PROT SERPL-MCNC: 6.7 G/DL (ref 6–8.5)
QT INTERVAL: 294 MS
QT INTERVAL: 382 MS
QTC INTERVAL: 418 MS
QTC INTERVAL: 429 MS
RBC # BLD AUTO: 4.82 10*6/MM3 (ref 4.14–5.8)
SODIUM SERPL-SCNC: 138 MMOL/L (ref 136–145)
TROPONIN T SERPL HS-MCNC: 8 NG/L
WBC NRBC COR # BLD AUTO: 5.89 10*3/MM3 (ref 3.4–10.8)
WHOLE BLOOD HOLD COAG: NORMAL
WHOLE BLOOD HOLD SPECIMEN: NORMAL

## 2024-05-06 PROCEDURE — 85025 COMPLETE CBC W/AUTO DIFF WBC: CPT

## 2024-05-06 PROCEDURE — 82948 REAGENT STRIP/BLOOD GLUCOSE: CPT

## 2024-05-06 PROCEDURE — 25510000001 IOPAMIDOL PER 1 ML: Performed by: EMERGENCY MEDICINE

## 2024-05-06 PROCEDURE — 93005 ELECTROCARDIOGRAM TRACING: CPT | Performed by: EMERGENCY MEDICINE

## 2024-05-06 PROCEDURE — 93005 ELECTROCARDIOGRAM TRACING: CPT

## 2024-05-06 PROCEDURE — 80053 COMPREHEN METABOLIC PANEL: CPT

## 2024-05-06 PROCEDURE — 83735 ASSAY OF MAGNESIUM: CPT

## 2024-05-06 PROCEDURE — 71275 CT ANGIOGRAPHY CHEST: CPT

## 2024-05-06 PROCEDURE — 71046 X-RAY EXAM CHEST 2 VIEWS: CPT

## 2024-05-06 PROCEDURE — 84484 ASSAY OF TROPONIN QUANT: CPT

## 2024-05-06 PROCEDURE — 99285 EMERGENCY DEPT VISIT HI MDM: CPT

## 2024-05-06 RX ORDER — SODIUM CHLORIDE 0.9 % (FLUSH) 0.9 %
10 SYRINGE (ML) INJECTION AS NEEDED
Status: DISCONTINUED | OUTPATIENT
Start: 2024-05-06 | End: 2024-05-06 | Stop reason: HOSPADM

## 2024-05-06 RX ADMIN — IOPAMIDOL 75 ML: 755 INJECTION, SOLUTION INTRAVENOUS at 08:53

## 2024-05-06 NOTE — TELEPHONE ENCOUNTER
Caller: David Jean Baptiste    Relationship to patient: Self    Best call back number: [phone]     Type of visit: FOLLOW UP    Requested date: ASAP     If rescheduling, when is the original appointment: 05/06/24     Additional notes:PATIENT WAS SCHEDULED FOR TODAY 05/06/24 BUT IS IN THE HOSPITAL. PATIENT WOULD LIKE TO RESCHEDULE AS SOON AS POSSIBLE.

## 2024-05-07 ENCOUNTER — OFFICE VISIT (OUTPATIENT)
Dept: CARDIOLOGY | Facility: HOSPITAL | Age: 64
End: 2024-05-07
Payer: COMMERCIAL

## 2024-05-07 VITALS
BODY MASS INDEX: 26.91 KG/M2 | DIASTOLIC BLOOD PRESSURE: 66 MMHG | HEART RATE: 74 BPM | HEIGHT: 76 IN | SYSTOLIC BLOOD PRESSURE: 117 MMHG | RESPIRATION RATE: 18 BRPM | OXYGEN SATURATION: 95 % | WEIGHT: 221 LBS | TEMPERATURE: 97.3 F

## 2024-05-07 DIAGNOSIS — I10 PRIMARY HYPERTENSION: ICD-10-CM

## 2024-05-07 DIAGNOSIS — I48.0 PAROXYSMAL ATRIAL FIBRILLATION: ICD-10-CM

## 2024-05-07 DIAGNOSIS — R55 SYNCOPE AND COLLAPSE: Primary | ICD-10-CM

## 2024-05-07 DIAGNOSIS — I50.22 CHRONIC SYSTOLIC HEART FAILURE: ICD-10-CM

## 2024-05-07 RX ORDER — POTASSIUM CHLORIDE 1500 MG/1
20 TABLET, EXTENDED RELEASE ORAL DAILY PRN
Start: 2024-05-07

## 2024-05-07 RX ORDER — FUROSEMIDE 40 MG/1
40 TABLET ORAL DAILY PRN
Start: 2024-05-07

## 2024-05-13 ENCOUNTER — OFFICE VISIT (OUTPATIENT)
Dept: NEUROLOGY | Facility: CLINIC | Age: 64
End: 2024-05-13
Payer: COMMERCIAL

## 2024-05-13 VITALS
OXYGEN SATURATION: 96 % | HEART RATE: 87 BPM | HEIGHT: 76 IN | SYSTOLIC BLOOD PRESSURE: 126 MMHG | BODY MASS INDEX: 26.91 KG/M2 | DIASTOLIC BLOOD PRESSURE: 72 MMHG

## 2024-05-13 DIAGNOSIS — R25.1 TREMOR: Primary | ICD-10-CM

## 2024-05-13 PROCEDURE — 99214 OFFICE O/P EST MOD 30 MIN: CPT | Performed by: PSYCHIATRY & NEUROLOGY

## 2024-05-13 RX ORDER — PRIMIDONE 250 MG/1
250 TABLET ORAL NIGHTLY
Qty: 90 TABLET | Refills: 1 | Status: SHIPPED | OUTPATIENT
Start: 2024-05-13 | End: 2024-08-11

## 2024-05-13 NOTE — PROGRESS NOTES
Subjective:    CC: David Jean Baptiste is in clinic today for follow up for history of benign essential tremors.    HPI:  Initial visit: 3/16/2024: Patient is a 63-year-old male with past medical history of hypertension, hyperlipidemia, paroxysmal A-fib on Xarelto referred to the clinic for evaluation of tremors.he reports that he has had tremors for over 40 years and it has slowly and progressively become worse.  He reports tremors while holding cup of coffee or glass of water.  He also notes difficulty using spoon and fork.  He also finds it difficult to write because of tremors.  He reports family history of tremors in his father.  He denies any resting tremors.  Denies any problems with walking or balance.  He has never tried any medications to help reduce intensity or frequency of these tremors.  He reports that tremors usually gets worse when he is anxious or stressed.    Follow-up: 5/13/2024: He is in clinic for regular follow-up.  Since his initial visit in March 2024, he reports that he is now taking primidone 200 mg at bedtime and he has had good response with 50 to 60% reduction in intensity of tremors.  He is tolerating primidone well without any side effects.        The following portions of the patient's history were reviewed and updated as of 05/13/2024: allergies, social history, and problem list.       Current Outpatient Medications:     furosemide (LASIX) 40 MG tablet, Take 1 tablet by mouth Daily As Needed (edema, shortness of breath)., Disp: , Rfl:     lisinopril (PRINIVIL,ZESTRIL) 5 MG tablet, Take 1 tablet by mouth Daily., Disp: 90 tablet, Rfl: 1    lovastatin (ALTOPREV) 40 MG 24 hr tablet, Take 1 tablet by mouth Every Night., Disp: , Rfl:     metoprolol tartrate (LOPRESSOR) 25 MG tablet, Take 1 tablet by mouth Every 12 (Twelve) Hours., Disp: 180 tablet, Rfl: 3    Multiple Vitamins-Minerals (PRESERVISION AREDS 2 PO), Take 2 tablets by mouth Daily., Disp: , Rfl:     multivitamin (THERAGRAN)  "tablet tablet, Take 1 tablet by mouth Daily., Disp: , Rfl:     omeprazole (priLOSEC) 20 MG capsule, Take 1 capsule by mouth Daily., Disp: , Rfl:     potassium chloride ER (K-TAB) 20 MEQ tablet controlled-release ER tablet, Take 1 tablet by mouth Daily As Needed (when taking lasix)., Disp: , Rfl:     primidone (MYSOLINE) 250 MG tablet, Take 1 tablet by mouth Every Night for 90 days., Disp: 90 tablet, Rfl: 1    rivaroxaban (XARELTO) 20 MG tablet, Take 1 tablet by mouth Daily With Dinner. Indications: Atrial Fibrillation, Disp: 30 tablet, Rfl: 6    sertraline (ZOLOFT) 100 MG tablet, Take 1 tablet by mouth Daily., Disp: , Rfl:     Turmeric 500 MG capsule, Take 1 capsule by mouth Daily., Disp: , Rfl:     vitamin B-12 (CYANOCOBALAMIN) 500 MCG tablet, Take 1 tablet by mouth Daily. OTC, Disp: , Rfl:    Past Medical History:   Diagnosis Date    Abnormal ECG 9/1/23    Anxiety     Arthritis     Atrial fibrillation     Coarse tremors     GERD (gastroesophageal reflux disease)     History of migraine     Hyperlipidemia     Hypertension 9/1/23    Renal disorder     Wears dentures     full plate upper , lower, removable implant    Wears glasses       Past Surgical History:   Procedure Laterality Date    ABLATION OF DYSRHYTHMIC FOCUS  12/6/23    CARDIAC ELECTROPHYSIOLOGY PROCEDURE N/A 12/06/2023    Procedure: Ablation atrial fibrillation; Rythmia; general anesthesia; DNS NOAC;  Surgeon: Ryan Shaikh DO;  Location: [facility] INVASIVE LOCATION;  Service: Cardiovascular;  Laterality: N/A;    CARDIOVERSION      CIRCUMCISION      COLONOSCOPY      with polypectomy    DENTAL PROCEDURE Bilateral     implants    EYE SURGERY Bilateral     cataract      Family History   Problem Relation Age of Onset    Dementia Mother     Migraines Mother     No Known Problems Father     Asthma Brother         Review of Systems  Objective:    /72   Pulse 87   Ht 193 cm (75.98\")   SpO2 96%   BMI 26.91 kg/m²     Neurology Exam:  General apperance: " NAD.     Mental status: Alert, awake and oriented to time place and person.    Language and Speech: No aphasia or dysarthria.    CN II to XII: Intact.    Opthalmoscopic Exam: No papilledema.    Motor:  Right UE muscle strength 5/5. Normal tone.     Left UE muscle strength 5/5. Normal tone.      Right LE muscle strength 5/5. Normal tone.     Left LE muscle strength 5/5. Normal tone.      Sensory: Normal light touch, vibration and pinprick sensation bilaterally.    DTRs: 2+ bilaterally.    Babinski: Negative bilaterally.    Co-ordination: Normal finger-to-nose, heel to shin B/L.    Mild to moderate intensity action and postural tremors noted-improved from initial visit.  He was noted to have severe tremors on initial visit.    Rhomberg: Negative.    Gait: Normal.    Cardiovascular: Regular rate and rhythm without murmur, gallop or rub.    Assessment and Plan:  1. Tremor  He has responded well to primidone 200 mg at bedtime and has had 50 to 60% reduction in intensity of his tremors.  Since he does not have side effects with primidone 200 mg dose, it will be increased to 250 mg dose for better therapeutic effect.  I will see him back in clinic in 6 months for follow-up and at that time I will plan to introduce primidone during daytime.       I spent 30 minutes in patient care: Reviewing records prior to the visit, entering orders and documentation and spent more than wagner 50% of this time face-to-face in management, instructions and education regarding above mentioned diagnosis and also on counseling and discussing about taking medication regularly, possible side effects with medication use, importance of good sleep hygiene, good hydration and regular exercise.    Return in about 6 months (around 11/13/2024).       Note to patient: The 21st Century Cures Act makes medical notes like these available to patients in the interest of transparency. However, be advised this is a medical document. It is intended as peer to peer  communication. It is written in medical language and may contain abbreviations or verbiage that are unfamiliar. It may appear blunt or direct. Medical documents are intended to carry relevant information, facts as evident, and the clinical opinion of the physician.

## 2024-06-03 ENCOUNTER — OFFICE VISIT (OUTPATIENT)
Dept: CARDIOLOGY | Facility: CLINIC | Age: 64
End: 2024-06-03
Payer: COMMERCIAL

## 2024-06-03 VITALS
HEART RATE: 66 BPM | WEIGHT: 224 LBS | SYSTOLIC BLOOD PRESSURE: 128 MMHG | HEIGHT: 76 IN | DIASTOLIC BLOOD PRESSURE: 86 MMHG | BODY MASS INDEX: 27.28 KG/M2 | OXYGEN SATURATION: 96 %

## 2024-06-03 DIAGNOSIS — I50.22 CHRONIC SYSTOLIC HEART FAILURE: ICD-10-CM

## 2024-06-03 DIAGNOSIS — I48.92 ATRIAL FIBRILLATION AND FLUTTER: Primary | ICD-10-CM

## 2024-06-03 DIAGNOSIS — I48.91 ATRIAL FIBRILLATION AND FLUTTER: Primary | ICD-10-CM

## 2024-06-03 DIAGNOSIS — I10 PRIMARY HYPERTENSION: ICD-10-CM

## 2024-06-03 PROBLEM — I48.0 PAROXYSMAL ATRIAL FIBRILLATION: Status: RESOLVED | Noted: 2023-09-01 | Resolved: 2024-06-03

## 2024-06-03 NOTE — PROGRESS NOTES
Encounter Date:06/03/2024      Patient ID: David Jean Baptiste is a 63 y.o. male.    Deniz Chang MD    Cheif Complaint EP: Atrial Fibrillation    PROBLEM LIST:  Patient Active Problem List    Diagnosis Date Noted    Atrial fibrillation and flutter 09/01/2023     Priority: High     Note Last Updated: 3/4/2024     [id number] 2  ARIANE with ECV, 9-23  ECV, 9/22/2023  Catheter ablation for atrial fibrillation (PVI) 12/6/2023      Chronic systolic heart failure 09/04/2023     Priority: Medium     Note Last Updated: 3/4/2024     Transesophageal echo, 9/2/2023: Left ventricular systolic function is mildly decreased. Left ventricular ejection fraction appears to be 41 - 45%.   Echo, 2/21/2024: Left ventricular systolic function is normal. Left ventricular ejection fraction appears to be 56 - 60%.       Chronic anticoagulation 11/09/2023     Priority: Low    Primary hypertension 11/09/2023     Priority: Low               History of Present Illness  Patient presents today for follow-up with a history of paroxysmal afibrillation status post pulmonary vein isolation on chronic anticoagulation.  Returns today complaining of recurrent episodic sustained palpitations occurring once or twice per week.  If this last more than half an hour he takes an extra metoprolol.  Symptoms include heart racing dyspnea and weakness.  He does not check his blood pressure at home.  He states compliance with his current medical regimen reports no significant adverse side effects.    No Known Allergies    Current Outpatient Medications   Medication Instructions    furosemide (LASIX) 40 mg, Oral, Daily PRN    lisinopril (PRINIVIL,ZESTRIL) 5 mg, Oral, Daily    lovastatin (ALTOPREV) 40 mg, Oral, Nightly    metoprolol tartrate (LOPRESSOR) 25 mg, Oral, Every 12 Hours Scheduled    Multiple Vitamins-Minerals (PRESERVISION AREDS 2 PO) 2 tablets, Oral, Daily    multivitamin (THERAGRAN) tablet tablet 1 tablet, Oral, Daily    omeprazole  "(PRILOSEC) 20 mg, Oral, Daily    potassium chloride ER (K-TAB) 20 MEQ tablet controlled-release ER tablet 20 mEq, Oral, Daily PRN    primidone (MYSOLINE) 250 mg, Oral, Nightly    rivaroxaban (XARELTO) 20 mg, Oral, Daily With Dinner    sertraline (ZOLOFT) 100 mg, Oral, Daily    Turmeric 500 MG capsule 1 capsule, Oral, Daily    vitamin B-12 (CYANOCOBALAMIN) 500 mcg, Oral, Daily, OTC       .    Objective:     /86 (BP Location: Left arm, Patient Position: Sitting, Cuff Size: Adult)   Pulse 66   Ht 193 cm (76\")   Wt 102 kg (224 lb)   SpO2 96%   BMI 27.27 kg/m²    Body mass index is 27.27 kg/m².     Vitals reviewed.   Constitutional:       Appearance: Well-developed.   Pulmonary:      Effort: Pulmonary effort is normal. No respiratory distress.      Breath sounds: Normal breath sounds. No wheezing. No rales.      Comments: Bases clear  Chest:      Chest wall: Not tender to palpatation.   Cardiovascular:      Normal rate. Regular rhythm.      Murmurs: There is no murmur.      No gallop.  No click. No rub.   Pulses:     Intact distal pulses.   Edema:     Peripheral edema absent.   Musculoskeletal: Normal range of motion.       Lab Review:     Lab Results   Component Value Date    GLUCOSE 112 (H) 05/06/2024    BUN 13 05/06/2024    CREATININE 0.97 05/06/2024    EGFR 87.7 05/06/2024    BCR 13.4 05/06/2024    K 4.5 05/06/2024    CO2 29.0 05/06/2024    CALCIUM 9.3 05/06/2024    ALBUMIN 4.1 05/06/2024    BILITOT 0.3 05/06/2024    AST 26 05/06/2024    ALT 20 05/06/2024     Lab Results   Component Value Date    WBC 5.89 05/06/2024    HGB 14.3 05/06/2024    HCT 43.9 05/06/2024    MCV 91.1 05/06/2024     05/06/2024     Lab Results   Component Value Date    TSH 4.160 10/10/2023         Procedures               Assessment:      Diagnosis Plan   1. Atrial fibrillation and flutter  Case Request EP Lab: Ablation atrial fibrillation    CT Angiogram Chest      2. Primary hypertension  Well-managed, continue current medical " regimen      3. Chronic systolic heart failure  Normalized ejection fraction, euvolemic,   4.     Chronic anticoagulation                                          tolerating anticoagulation, continue Xarelto 20 mg daily.       Plan:     Advance Care Planning   ACP discussion was declined by the patient. Patient has an advance directive (not in EMR), copy requested.           Stable cardiac status.  Continue current medications.     Thank you for allowing us to participate in the care of your patient.     Electronically signed by BRODIE Whalen, 06/03/24, 11:46 AM EDT.

## 2024-06-04 ENCOUNTER — PREP FOR SURGERY (OUTPATIENT)
Dept: OTHER | Facility: HOSPITAL | Age: 64
End: 2024-06-04
Payer: COMMERCIAL

## 2024-06-04 DIAGNOSIS — I48.92 ATRIAL FIBRILLATION AND FLUTTER: Primary | ICD-10-CM

## 2024-06-04 DIAGNOSIS — I48.91 ATRIAL FIBRILLATION AND FLUTTER: Primary | ICD-10-CM

## 2024-06-04 RX ORDER — SODIUM CHLORIDE 9 MG/ML
40 INJECTION, SOLUTION INTRAVENOUS AS NEEDED
OUTPATIENT
Start: 2024-06-04

## 2024-06-04 RX ORDER — ONDANSETRON 2 MG/ML
4 INJECTION INTRAMUSCULAR; INTRAVENOUS EVERY 6 HOURS PRN
OUTPATIENT
Start: 2024-06-04

## 2024-06-04 RX ORDER — ACETAMINOPHEN 325 MG/1
650 TABLET ORAL EVERY 4 HOURS PRN
OUTPATIENT
Start: 2024-06-04

## 2024-06-04 RX ORDER — NITROGLYCERIN 0.4 MG/1
0.4 TABLET SUBLINGUAL
OUTPATIENT
Start: 2024-06-04

## 2024-07-03 ENCOUNTER — HOSPITAL ENCOUNTER (OUTPATIENT)
Dept: CT IMAGING | Facility: HOSPITAL | Age: 64
Discharge: HOME | End: 2024-07-03
Payer: COMMERCIAL

## 2024-07-03 ENCOUNTER — PRE-ADMISSION TESTING (OUTPATIENT)
Dept: PREADMISSION TESTING | Facility: HOSPITAL | Age: 64
End: 2024-07-03
Payer: COMMERCIAL

## 2024-07-03 DIAGNOSIS — I48.92 ATRIAL FIBRILLATION AND FLUTTER: ICD-10-CM

## 2024-07-03 DIAGNOSIS — I48.91 ATRIAL FIBRILLATION AND FLUTTER: ICD-10-CM

## 2024-07-03 LAB
ANION GAP SERPL CALCULATED.3IONS-SCNC: 5 MMOL/L (ref 5–15)
BUN SERPL-MCNC: 12 MG/DL (ref 8–23)
BUN/CREAT SERPL: 15 (ref 7–25)
CALCIUM SPEC-SCNC: 9.3 MG/DL (ref 8.6–10.5)
CHLORIDE SERPL-SCNC: 105 MMOL/L (ref 98–107)
CO2 SERPL-SCNC: 28 MMOL/L (ref 22–29)
CREAT SERPL-MCNC: 0.8 MG/DL (ref 0.76–1.27)
DEPRECATED RDW RBC AUTO: 43.8 FL (ref 37–54)
EGFRCR SERPLBLD CKD-EPI 2021: 99.4 ML/MIN/1.73
ERYTHROCYTE [DISTWIDTH] IN BLOOD BY AUTOMATED COUNT: 12.9 % (ref 12.3–15.4)
GLUCOSE SERPL-MCNC: 97 MG/DL (ref 65–99)
HCT VFR BLD AUTO: 40.4 % (ref 37.5–51)
HGB BLD-MCNC: 12.9 G/DL (ref 13–17.7)
MCH RBC QN AUTO: 29.7 PG (ref 26.6–33)
MCHC RBC AUTO-ENTMCNC: 31.9 G/DL (ref 31.5–35.7)
MCV RBC AUTO: 92.9 FL (ref 79–97)
PLATELET # BLD AUTO: 185 10*3/MM3 (ref 140–450)
PMV BLD AUTO: 9.7 FL (ref 6–12)
POTASSIUM SERPL-SCNC: 4.5 MMOL/L (ref 3.5–5.2)
RBC # BLD AUTO: 4.35 10*6/MM3 (ref 4.14–5.8)
SODIUM SERPL-SCNC: 138 MMOL/L (ref 136–145)
WBC NRBC COR # BLD AUTO: 3.63 10*3/MM3 (ref 3.4–10.8)

## 2024-07-03 PROCEDURE — 71275 CT ANGIOGRAPHY CHEST: CPT

## 2024-07-03 PROCEDURE — 80048 BASIC METABOLIC PNL TOTAL CA: CPT

## 2024-07-03 PROCEDURE — 85027 COMPLETE CBC AUTOMATED: CPT

## 2024-07-03 PROCEDURE — 25510000001 IOPAMIDOL PER 1 ML: Performed by: PHYSICIAN ASSISTANT

## 2024-07-03 PROCEDURE — 36415 COLL VENOUS BLD VENIPUNCTURE: CPT

## 2024-07-03 RX ADMIN — IOPAMIDOL 80 ML: 755 INJECTION, SOLUTION INTRAVENOUS at 12:20

## 2024-07-03 NOTE — DISCHARGE INSTRUCTIONS
Dear Patient,    Do NOT eat, drink, or smoke after midnight the night before your procedure.   Take your medications as instructed by your doctor.    Glasses and jewelry may be worn, but dentures must be removed prior to your procedure.    Leave any items you consider valuable at home.      MORNING of your Procedure, please bring the following:     -Photo ID and insurance card(s)    -ALL medications in their ORIGINAL CONTAINERS or current medication list.    -Co-pay and/or deductible required by your insurance   -Copy of living will or power of  document (if not brought to Pre-Admission Testing department)   -CPAP mask and tubing, not your machine (if applicable)   -Relaxation aids (music, books, magazines)   -Skin Prep Instruction Sheet (if applicable)       Check in is on the 2nd floor of the [facility].  Your procedure will be performed in the cath lab or EP lab.  During your procedure, your family will wait in the cath lab waiting area where you checked in.      Need to make arrangements for transportation prior to discharge.    Educational handout related to procedure was given in Pre-Admission testing.  The educational handout is for informational purposes only.  If you have any questions about your procedure, please speak with your physician.      Please note:  If you are scheduled to have one of the following procedures: Pulmonary Vein Ablation, Lead Extraction, MitraClip, Cerebral Coilings or Embolization, please let your family know that after your procedure you will be going to recovery unit on the 2nd floor of the [facility].  When the physician is finished speaking with your family after your procedure is completed, your family will be directed or escorted to the surgery waiting area in the [facility].  This is where your family will wait until you are given a room assignment and then your family will be directed to the appropriate unit.

## 2024-07-03 NOTE — PAT
An arrival time for procedure was not provided during PAT visit. If patient had any questions or concerns about their arrival time, they were instructed to contact their surgeon/physician.  Additionally, if the patient referred to an arrival time that was acquired from their my chart account, patient was encouraged to verify that time with their surgeon/physician. Arrival times are NOT provided in Pre Admission Testing Department.    Patient directed to Radiology Department for CT after Pre Admission Testing Appointment.

## 2024-07-11 ENCOUNTER — ANESTHESIA (OUTPATIENT)
Dept: CARDIOLOGY | Facility: HOSPITAL | Age: 64
End: 2024-07-11
Payer: COMMERCIAL

## 2024-07-11 ENCOUNTER — ANESTHESIA EVENT (OUTPATIENT)
Dept: CARDIOLOGY | Facility: HOSPITAL | Age: 64
End: 2024-07-11
Payer: COMMERCIAL

## 2024-07-11 NOTE — ANESTHESIA PREPROCEDURE EVALUATION
Anesthesia Evaluation     Patient summary reviewed and Nursing notes reviewed   no history of anesthetic complications:   NPO Solid Status: > 8 hours  NPO Liquid Status: > 8 hours           Airway   Mallampati: II  TM distance: >3 FB  Neck ROM: full  No difficulty expected  Dental      Pulmonary - normal exam   Cardiovascular - normal exam    (+) hypertension, dysrhythmias      Neuro/Psych  (+) tremors, psychiatric history  GI/Hepatic/Renal/Endo    (+) GERD    Musculoskeletal     Abdominal    Substance History      OB/GYN          Other   arthritis,                 Anesthesia Plan    ASA 3     general     intravenous induction     Anesthetic plan, risks, benefits, and alternatives have been provided, discussed and informed consent has been obtained with: patient.    Plan discussed with CRNA.    CODE STATUS:

## 2024-07-18 DIAGNOSIS — I48.92 ATRIAL FIBRILLATION AND FLUTTER: Primary | ICD-10-CM

## 2024-07-18 DIAGNOSIS — I48.91 ATRIAL FIBRILLATION AND FLUTTER: Primary | ICD-10-CM

## 2024-07-24 ENCOUNTER — PREP FOR SURGERY (OUTPATIENT)
Dept: OTHER | Facility: HOSPITAL | Age: 64
End: 2024-07-24
Payer: COMMERCIAL

## 2024-07-24 DIAGNOSIS — I48.92 ATRIAL FIBRILLATION AND FLUTTER: Primary | ICD-10-CM

## 2024-07-24 DIAGNOSIS — I48.91 ATRIAL FIBRILLATION AND FLUTTER: Primary | ICD-10-CM

## 2024-07-24 RX ORDER — SODIUM CHLORIDE 9 MG/ML
40 INJECTION, SOLUTION INTRAVENOUS AS NEEDED
OUTPATIENT
Start: 2024-07-24

## 2024-07-24 RX ORDER — ACETAMINOPHEN 325 MG/1
650 TABLET ORAL EVERY 4 HOURS PRN
OUTPATIENT
Start: 2024-07-24

## 2024-07-24 RX ORDER — ONDANSETRON 2 MG/ML
4 INJECTION INTRAMUSCULAR; INTRAVENOUS EVERY 6 HOURS PRN
OUTPATIENT
Start: 2024-07-24

## 2024-07-24 RX ORDER — NITROGLYCERIN 0.4 MG/1
0.4 TABLET SUBLINGUAL
OUTPATIENT
Start: 2024-07-24

## 2024-07-25 ENCOUNTER — PREP FOR SURGERY (OUTPATIENT)
Dept: OTHER | Facility: HOSPITAL | Age: 64
End: 2024-07-25
Payer: COMMERCIAL

## 2024-08-01 NOTE — NURSING NOTE
"PRE-PVA ASSESSMENT  David Jean Baptiste 1960   [address] [place] [address] [address] [place] [address]   [phone]  There is no work phone number on file.        Referral Source: EP clinic- BRODIE Burnham  Information obtained from: [x] Medical record review  [x] Patient report  Scheduled for: redo PFA on August 28, 2024 with Dr. Shaikh  Allergies   No Known Allergies        AFib Specific History:  AFIBTYPE: Persistent     CHADS-VASc Risk Assessment                   1 Total Score     1 Hypertension     Criteria that do not apply:     CHF     Age >/= 75     DM     PRIOR STROKE/TIA/THROMBO     Vascular Disease     Age 65-74     Sex: Female                Anticoagulation: Xarelto  Cardioversion x 2  Failed AAD(s): Xarelto 20mg with dinner daily  Prior Ablation: 1     Is Mr. Jean Baptiste aware of his AFib? Yes              Onset: September 2023         Exacerbations: Yes              Frequency: 1-2x weekly          Alleviations: CVS/metoprolol                Duration: A few minutes up to one hour                Symptoms:              [x] Palpitations:                       [] Chest Discomfort:                [] Dizziness:                           [] Presyncope:               [x] Lightheadedness:               [] Syncope:               [] Fatigue:                              [] Other:               [x] Short of Breath:      Last Echo(s):  [x] TTE Date: 2/21/2024                            [x] ARIANE Date: 9/2/2023       EF: 56-60%                                               EF: 41-45%                                                               LA: \"Moderately dilated \"                                                                      VHD: Trace to mild AI, mild MR                Past medical History:             [] Diabetes: No                                                        Hemoglobin A1C   Date Value Ref Range Status   09/01/2023 5.80 (H) 4.80 - 5.60 % Final                 [] HYPOthyroidism-no  [] HYPERthyroidism-no "         TSH   Date Value Ref Range Status   10/10/2023 4.160 0.270 - 4.200 uIU/mL Final   09/01/2023 1.650 0.270 - 4.200 uIU/mL Final      [x] HTN                   [x] Tx: Lisinopril, metoprolol tartrate       [x] Controlled? Yes     [x] Heart Failure-no                 [] CVA-no                         [] TIA-no               [] CAD-no                               [] MI -no        [] Dyslipidemia-no  [] Ischemic Evaluation: No  [] Sleep Apnea Diagnosed-no  [] Sleep Apnea Suspected-no             [] Obesity-No  Summary of Patient Contact:     Discussed upcoming PFA instructions, reviewed PAT and CT schedule.  Instructed to continue current medications as prescribed, n.p.o. at midnight night before procedure.

## 2024-08-27 ENCOUNTER — ANESTHESIA EVENT (OUTPATIENT)
Dept: CARDIOLOGY | Facility: HOSPITAL | Age: 64
End: 2024-08-27
Payer: COMMERCIAL

## 2024-08-27 NOTE — ANESTHESIA PREPROCEDURE EVALUATION
Anesthesia Evaluation     Patient summary reviewed and Nursing notes reviewed   NPO Solid Status: > 8 hours  NPO Liquid Status: > 2 hours           Airway   Mallampati: I  TM distance: >3 FB  Neck ROM: full  No difficulty expected  Dental    (+) edentulous, upper dentures and lower dentures    Pulmonary    (-) asthma, shortness of breath, recent URI, sleep apnea, not a smoker  Cardiovascular     ECG reviewed    (+) hypertension, dysrhythmias (SP PVA 12/23) Paroxysmal Atrial Fib  (-) past MI, angina, cardiac stents    ROS comment: ECG NSR   ECHO 2023 EF >41 %.No CORINNE thrombus  Mild AI MR     PVA 12./23     ECHO 2024  EF >56%         Neuro/Psych  (+) tremors, psychiatric history  (-) seizures, CVA  GI/Hepatic/Renal/Endo    (+) GERD  (-) no renal disease, diabetes, no thyroid disorder    Musculoskeletal     Abdominal    Substance History      OB/GYN          Other   arthritis,     ROS/Med Hx Other: LABS WNL                 Anesthesia Plan    ASA 3     general     intravenous induction     Anesthetic plan, risks, benefits, and alternatives have been provided, discussed and informed consent has been obtained with: patient.    Plan discussed with CRNA.    CODE STATUS:

## 2024-08-28 ENCOUNTER — ANESTHESIA (OUTPATIENT)
Dept: CARDIOLOGY | Facility: HOSPITAL | Age: 64
End: 2024-08-28
Payer: COMMERCIAL

## 2024-08-28 ENCOUNTER — HOSPITAL ENCOUNTER (OUTPATIENT)
Facility: HOSPITAL | Age: 64
Discharge: HOME | End: 2024-08-28
Attending: INTERNAL MEDICINE | Admitting: INTERNAL MEDICINE
Payer: COMMERCIAL

## 2024-08-28 VITALS
BODY MASS INDEX: 27.01 KG/M2 | DIASTOLIC BLOOD PRESSURE: 105 MMHG | TEMPERATURE: 97.1 F | WEIGHT: 221.8 LBS | SYSTOLIC BLOOD PRESSURE: 161 MMHG | RESPIRATION RATE: 19 BRPM | OXYGEN SATURATION: 100 % | HEIGHT: 76 IN | HEART RATE: 63 BPM

## 2024-08-28 DIAGNOSIS — I48.91 ATRIAL FIBRILLATION AND FLUTTER: ICD-10-CM

## 2024-08-28 DIAGNOSIS — I48.92 ATRIAL FIBRILLATION AND FLUTTER: ICD-10-CM

## 2024-08-28 PROBLEM — I48.19 PERSISTENT ATRIAL FIBRILLATION: Status: ACTIVE | Noted: 2024-08-28

## 2024-08-28 LAB
ACT BLD: 324 SECONDS (ref 82–152)
ACT BLD: 379 SECONDS (ref 82–152)
ANION GAP SERPL CALCULATED.3IONS-SCNC: 9 MMOL/L (ref 5–15)
BUN SERPL-MCNC: 10 MG/DL (ref 8–23)
BUN/CREAT SERPL: 12.5 (ref 7–25)
CALCIUM SPEC-SCNC: 9.3 MG/DL (ref 8.6–10.5)
CHLORIDE SERPL-SCNC: 106 MMOL/L (ref 98–107)
CO2 SERPL-SCNC: 28 MMOL/L (ref 22–29)
CREAT SERPL-MCNC: 0.8 MG/DL (ref 0.76–1.27)
DEPRECATED RDW RBC AUTO: 42 FL (ref 37–54)
EGFRCR SERPLBLD CKD-EPI 2021: 99.4 ML/MIN/1.73
ERYTHROCYTE [DISTWIDTH] IN BLOOD BY AUTOMATED COUNT: 12.5 % (ref 12.3–15.4)
GLUCOSE SERPL-MCNC: 71 MG/DL (ref 65–99)
HCT VFR BLD AUTO: 43.3 % (ref 37.5–51)
HGB BLD-MCNC: 13.8 G/DL (ref 13–17.7)
MCH RBC QN AUTO: 29.4 PG (ref 26.6–33)
MCHC RBC AUTO-ENTMCNC: 31.9 G/DL (ref 31.5–35.7)
MCV RBC AUTO: 92.3 FL (ref 79–97)
PLATELET # BLD AUTO: 200 10*3/MM3 (ref 140–450)
PMV BLD AUTO: 9.5 FL (ref 6–12)
POTASSIUM SERPL-SCNC: 4.1 MMOL/L (ref 3.5–5.2)
RBC # BLD AUTO: 4.69 10*6/MM3 (ref 4.14–5.8)
SODIUM SERPL-SCNC: 143 MMOL/L (ref 136–145)
WBC NRBC COR # BLD AUTO: 3.72 10*3/MM3 (ref 3.4–10.8)

## 2024-08-28 PROCEDURE — 36415 COLL VENOUS BLD VENIPUNCTURE: CPT

## 2024-08-28 PROCEDURE — 80048 BASIC METABOLIC PNL TOTAL CA: CPT | Performed by: INTERNAL MEDICINE

## 2024-08-28 PROCEDURE — 85347 COAGULATION TIME ACTIVATED: CPT

## 2024-08-28 PROCEDURE — C1733 CATH, EP, OTHR THAN COOL-TIP: HCPCS | Performed by: INTERNAL MEDICINE

## 2024-08-28 PROCEDURE — 93655 ICAR CATH ABLTJ DSCRT ARRHYT: CPT | Performed by: INTERNAL MEDICINE

## 2024-08-28 PROCEDURE — C1894 INTRO/SHEATH, NON-LASER: HCPCS | Performed by: INTERNAL MEDICINE

## 2024-08-28 PROCEDURE — 25010000002 ESMOLOL 100 MG/10ML SOLUTION

## 2024-08-28 PROCEDURE — 93005 ELECTROCARDIOGRAM TRACING: CPT | Performed by: INTERNAL MEDICINE

## 2024-08-28 PROCEDURE — 93623 PRGRMD STIMJ&PACG IV RX NFS: CPT | Performed by: INTERNAL MEDICINE

## 2024-08-28 PROCEDURE — 25010000002 PROTAMINE SULFATE PER 10 MG: Performed by: INTERNAL MEDICINE

## 2024-08-28 PROCEDURE — C1732 CATH, EP, DIAG/ABL, 3D/VECT: HCPCS | Performed by: INTERNAL MEDICINE

## 2024-08-28 PROCEDURE — 25010000002 ADENOSINE PER 6 MG: Performed by: INTERNAL MEDICINE

## 2024-08-28 PROCEDURE — 85027 COMPLETE CBC AUTOMATED: CPT | Performed by: INTERNAL MEDICINE

## 2024-08-28 PROCEDURE — 25810000003 SODIUM CHLORIDE 0.9 % SOLUTION: Performed by: INTERNAL MEDICINE

## 2024-08-28 PROCEDURE — 25010000002 SUGAMMADEX 200 MG/2ML SOLUTION

## 2024-08-28 PROCEDURE — C1730 CATH, EP, 19 OR FEW ELECT: HCPCS | Performed by: INTERNAL MEDICINE

## 2024-08-28 PROCEDURE — C1760 CLOSURE DEV, VASC: HCPCS | Performed by: INTERNAL MEDICINE

## 2024-08-28 PROCEDURE — 25010000002 LIDOCAINE 1 % SOLUTION

## 2024-08-28 PROCEDURE — 93657 TX L/R ATRIAL FIB ADDL: CPT | Performed by: INTERNAL MEDICINE

## 2024-08-28 PROCEDURE — C1769 GUIDE WIRE: HCPCS | Performed by: INTERNAL MEDICINE

## 2024-08-28 PROCEDURE — 25010000002 HEPARIN (PORCINE) PER 1000 UNITS: Performed by: INTERNAL MEDICINE

## 2024-08-28 PROCEDURE — C1766 INTRO/SHEATH,STRBLE,NON-PEEL: HCPCS | Performed by: INTERNAL MEDICINE

## 2024-08-28 PROCEDURE — C1759 CATH, INTRA ECHOCARDIOGRAPHY: HCPCS | Performed by: INTERNAL MEDICINE

## 2024-08-28 PROCEDURE — 25010000002 PROPOFOL 10 MG/ML EMULSION

## 2024-08-28 PROCEDURE — 25010000002 PHENYLEPHRINE 10 MG/ML SOLUTION

## 2024-08-28 PROCEDURE — 93656 COMPRE EP EVAL ABLTJ ATR FIB: CPT | Performed by: INTERNAL MEDICINE

## 2024-08-28 PROCEDURE — 25810000003 LACTATED RINGERS PER 1000 ML: Performed by: ANESTHESIOLOGY

## 2024-08-28 PROCEDURE — 93622 COMP EP EVAL L VENTR PAC&REC: CPT | Performed by: INTERNAL MEDICINE

## 2024-08-28 RX ORDER — MIDAZOLAM HYDROCHLORIDE 1 MG/ML
1 INJECTION INTRAMUSCULAR; INTRAVENOUS
Status: DISCONTINUED | OUTPATIENT
Start: 2024-08-28 | End: 2024-08-28 | Stop reason: HOSPADM

## 2024-08-28 RX ORDER — VECURONIUM BROMIDE 1 MG/ML
INJECTION, POWDER, LYOPHILIZED, FOR SOLUTION INTRAVENOUS AS NEEDED
Status: DISCONTINUED | OUTPATIENT
Start: 2024-08-28 | End: 2024-08-28 | Stop reason: SURG

## 2024-08-28 RX ORDER — EPHEDRINE SULFATE 50 MG/ML
INJECTION, SOLUTION INTRAVENOUS AS NEEDED
Status: DISCONTINUED | OUTPATIENT
Start: 2024-08-28 | End: 2024-08-28 | Stop reason: SURG

## 2024-08-28 RX ORDER — SODIUM CHLORIDE 9 MG/ML
40 INJECTION, SOLUTION INTRAVENOUS AS NEEDED
Status: DISCONTINUED | OUTPATIENT
Start: 2024-08-28 | End: 2024-08-28 | Stop reason: HOSPADM

## 2024-08-28 RX ORDER — ONDANSETRON 2 MG/ML
4 INJECTION INTRAMUSCULAR; INTRAVENOUS EVERY 6 HOURS PRN
Status: DISCONTINUED | OUTPATIENT
Start: 2024-08-28 | End: 2024-08-28 | Stop reason: HOSPADM

## 2024-08-28 RX ORDER — LIDOCAINE HYDROCHLORIDE 10 MG/ML
INJECTION, SOLUTION INFILTRATION; PERINEURAL AS NEEDED
Status: DISCONTINUED | OUTPATIENT
Start: 2024-08-28 | End: 2024-08-28 | Stop reason: SURG

## 2024-08-28 RX ORDER — SODIUM CHLORIDE 9 MG/ML
INJECTION, SOLUTION INTRAVENOUS
Status: DISCONTINUED | OUTPATIENT
Start: 2024-08-28 | End: 2024-08-28 | Stop reason: HOSPADM

## 2024-08-28 RX ORDER — LABETALOL HYDROCHLORIDE 5 MG/ML
5 INJECTION, SOLUTION INTRAVENOUS
Status: DISCONTINUED | OUTPATIENT
Start: 2024-08-28 | End: 2024-08-28 | Stop reason: HOSPADM

## 2024-08-28 RX ORDER — NITROGLYCERIN 0.4 MG/1
0.4 TABLET SUBLINGUAL
Status: DISCONTINUED | OUTPATIENT
Start: 2024-08-28 | End: 2024-08-28 | Stop reason: HOSPADM

## 2024-08-28 RX ORDER — HYDRALAZINE HYDROCHLORIDE 20 MG/ML
5 INJECTION INTRAMUSCULAR; INTRAVENOUS
Status: DISCONTINUED | OUTPATIENT
Start: 2024-08-28 | End: 2024-08-28 | Stop reason: HOSPADM

## 2024-08-28 RX ORDER — HYDROCODONE BITARTRATE AND ACETAMINOPHEN 5; 325 MG/1; MG/1
1 TABLET ORAL ONCE AS NEEDED
Status: DISCONTINUED | OUTPATIENT
Start: 2024-08-28 | End: 2024-08-28 | Stop reason: HOSPADM

## 2024-08-28 RX ORDER — SODIUM CHLORIDE, SODIUM LACTATE, POTASSIUM CHLORIDE, CALCIUM CHLORIDE 600; 310; 30; 20 MG/100ML; MG/100ML; MG/100ML; MG/100ML
9 INJECTION, SOLUTION INTRAVENOUS CONTINUOUS
Status: DISCONTINUED | OUTPATIENT
Start: 2024-08-28 | End: 2024-08-28 | Stop reason: HOSPADM

## 2024-08-28 RX ORDER — IPRATROPIUM BROMIDE AND ALBUTEROL SULFATE 2.5; .5 MG/3ML; MG/3ML
3 SOLUTION RESPIRATORY (INHALATION) ONCE AS NEEDED
Status: DISCONTINUED | OUTPATIENT
Start: 2024-08-28 | End: 2024-08-28 | Stop reason: HOSPADM

## 2024-08-28 RX ORDER — ADENOSINE 3 MG/ML
INJECTION, SOLUTION INTRAVENOUS
Status: DISCONTINUED | OUTPATIENT
Start: 2024-08-28 | End: 2024-08-28 | Stop reason: HOSPADM

## 2024-08-28 RX ORDER — LIDOCAINE HYDROCHLORIDE 10 MG/ML
0.5 INJECTION, SOLUTION EPIDURAL; INFILTRATION; INTRACAUDAL; PERINEURAL ONCE AS NEEDED
Status: DISCONTINUED | OUTPATIENT
Start: 2024-08-28 | End: 2024-08-28 | Stop reason: HOSPADM

## 2024-08-28 RX ORDER — SODIUM CHLORIDE 0.9 % (FLUSH) 0.9 %
10 SYRINGE (ML) INJECTION AS NEEDED
Status: DISCONTINUED | OUTPATIENT
Start: 2024-08-28 | End: 2024-08-28 | Stop reason: HOSPADM

## 2024-08-28 RX ORDER — SODIUM CHLORIDE 0.9 % (FLUSH) 0.9 %
10 SYRINGE (ML) INJECTION EVERY 12 HOURS SCHEDULED
Status: DISCONTINUED | OUTPATIENT
Start: 2024-08-28 | End: 2024-08-28 | Stop reason: HOSPADM

## 2024-08-28 RX ORDER — ONDANSETRON 2 MG/ML
4 INJECTION INTRAMUSCULAR; INTRAVENOUS ONCE AS NEEDED
Status: DISCONTINUED | OUTPATIENT
Start: 2024-08-28 | End: 2024-08-28 | Stop reason: HOSPADM

## 2024-08-28 RX ORDER — FENTANYL CITRATE 50 UG/ML
50 INJECTION, SOLUTION INTRAMUSCULAR; INTRAVENOUS
Status: DISCONTINUED | OUTPATIENT
Start: 2024-08-28 | End: 2024-08-28 | Stop reason: HOSPADM

## 2024-08-28 RX ORDER — HEPARIN SODIUM 1000 [USP'U]/ML
INJECTION, SOLUTION INTRAVENOUS; SUBCUTANEOUS
Status: DISCONTINUED | OUTPATIENT
Start: 2024-08-28 | End: 2024-08-28 | Stop reason: HOSPADM

## 2024-08-28 RX ORDER — FAMOTIDINE 10 MG/ML
20 INJECTION, SOLUTION INTRAVENOUS ONCE
Status: DISCONTINUED | OUTPATIENT
Start: 2024-08-28 | End: 2024-08-28 | Stop reason: HOSPADM

## 2024-08-28 RX ORDER — PHENYLEPHRINE HYDROCHLORIDE 10 MG/ML
INJECTION INTRAVENOUS AS NEEDED
Status: DISCONTINUED | OUTPATIENT
Start: 2024-08-28 | End: 2024-08-28 | Stop reason: SURG

## 2024-08-28 RX ORDER — FAMOTIDINE 20 MG/1
20 TABLET, FILM COATED ORAL ONCE
Status: COMPLETED | OUTPATIENT
Start: 2024-08-28 | End: 2024-08-28

## 2024-08-28 RX ORDER — ESMOLOL HYDROCHLORIDE 10 MG/ML
INJECTION INTRAVENOUS AS NEEDED
Status: DISCONTINUED | OUTPATIENT
Start: 2024-08-28 | End: 2024-08-28 | Stop reason: SURG

## 2024-08-28 RX ORDER — SODIUM CHLORIDE 0.9 % (FLUSH) 0.9 %
3 SYRINGE (ML) INJECTION EVERY 12 HOURS SCHEDULED
Status: DISCONTINUED | OUTPATIENT
Start: 2024-08-28 | End: 2024-08-28 | Stop reason: HOSPADM

## 2024-08-28 RX ORDER — PROPOFOL 10 MG/ML
VIAL (ML) INTRAVENOUS AS NEEDED
Status: DISCONTINUED | OUTPATIENT
Start: 2024-08-28 | End: 2024-08-28 | Stop reason: SURG

## 2024-08-28 RX ORDER — ROCURONIUM BROMIDE 10 MG/ML
INJECTION, SOLUTION INTRAVENOUS AS NEEDED
Status: DISCONTINUED | OUTPATIENT
Start: 2024-08-28 | End: 2024-08-28 | Stop reason: SURG

## 2024-08-28 RX ORDER — PROTAMINE SULFATE 10 MG/ML
INJECTION, SOLUTION INTRAVENOUS
Status: DISCONTINUED | OUTPATIENT
Start: 2024-08-28 | End: 2024-08-28 | Stop reason: HOSPADM

## 2024-08-28 RX ORDER — SODIUM CHLORIDE 0.9 % (FLUSH) 0.9 %
3-10 SYRINGE (ML) INJECTION AS NEEDED
Status: DISCONTINUED | OUTPATIENT
Start: 2024-08-28 | End: 2024-08-28 | Stop reason: HOSPADM

## 2024-08-28 RX ORDER — HEPARIN SODIUM 10000 [USP'U]/100ML
INJECTION, SOLUTION INTRAVENOUS
Status: DISCONTINUED | OUTPATIENT
Start: 2024-08-28 | End: 2024-08-28 | Stop reason: HOSPADM

## 2024-08-28 RX ORDER — LIDOCAINE HYDROCHLORIDE 40 MG/ML
SOLUTION TOPICAL AS NEEDED
Status: DISCONTINUED | OUTPATIENT
Start: 2024-08-28 | End: 2024-08-28 | Stop reason: SURG

## 2024-08-28 RX ORDER — ACETAMINOPHEN 325 MG/1
650 TABLET ORAL EVERY 4 HOURS PRN
Status: DISCONTINUED | OUTPATIENT
Start: 2024-08-28 | End: 2024-08-28 | Stop reason: HOSPADM

## 2024-08-28 RX ADMIN — PHENYLEPHRINE HYDROCHLORIDE 160 MCG: 10 INJECTION INTRAVENOUS at 09:56

## 2024-08-28 RX ADMIN — ROCURONIUM BROMIDE 50 MG: 10 SOLUTION INTRAVENOUS at 09:35

## 2024-08-28 RX ADMIN — VECURONIUM BROMIDE 10 MG: 1 INJECTION, POWDER, LYOPHILIZED, FOR SOLUTION INTRAVENOUS at 08:41

## 2024-08-28 RX ADMIN — EPHEDRINE SULFATE 10 MG: 50 INJECTION INTRAVENOUS at 08:55

## 2024-08-28 RX ADMIN — LIDOCAINE HYDROCHLORIDE 1 EACH: 40 SOLUTION TOPICAL at 08:43

## 2024-08-28 RX ADMIN — PROPOFOL 250 MG: 10 INJECTION, EMULSION INTRAVENOUS at 08:41

## 2024-08-28 RX ADMIN — FAMOTIDINE 20 MG: 20 TABLET, FILM COATED ORAL at 07:55

## 2024-08-28 RX ADMIN — SODIUM CHLORIDE, POTASSIUM CHLORIDE, SODIUM LACTATE AND CALCIUM CHLORIDE: 600; 310; 30; 20 INJECTION, SOLUTION INTRAVENOUS at 08:29

## 2024-08-28 RX ADMIN — LIDOCAINE HYDROCHLORIDE 50 MG: 10 INJECTION, SOLUTION INFILTRATION; PERINEURAL at 08:41

## 2024-08-28 RX ADMIN — ESMOLOL HYDROCHLORIDE 60 MG: 10 INJECTION, SOLUTION INTRAVENOUS at 08:41

## 2024-08-28 RX ADMIN — EPHEDRINE SULFATE 10 MG: 50 INJECTION INTRAVENOUS at 09:02

## 2024-08-28 RX ADMIN — SUGAMMADEX 300 MG: 100 INJECTION, SOLUTION INTRAVENOUS at 10:20

## 2024-08-28 NOTE — ANESTHESIA POSTPROCEDURE EVALUATION
Patient: David Jean Baptiste    Procedure Summary       Date: 08/28/24 Room / Location: [facility] C[facility]/EP LAB E / [facility] EP INVASIVE LOCATION    Anesthesia Start: 0828 Anesthesia Stop: 1026    Procedure: PVA, Rythmia, PFA, no meds to hold Diagnosis:       Atrial fibrillation and flutter      (AF)    Providers: Ryan Shaikh DO Provider: Ezra Cabrales MD    Anesthesia Type: general ASA Status: 3            Anesthesia Type: general    Vitals  Vitals Value Taken Time   /71 08/28/24 1028   Temp 97.1 °F (36.2 °C) 08/28/24 1028   Pulse 71 08/28/24 1028   Resp 16 08/28/24 1028   SpO2 97 % 08/28/24 1028           Post Anesthesia Care and Evaluation    Patient location during evaluation: PACU  Patient participation: complete - patient participated  Level of consciousness: sleepy but conscious  Pain management: adequate    Airway patency: patent  Anesthetic complications: No anesthetic complications  PONV Status: none  Cardiovascular status: hemodynamically stable and acceptable  Respiratory status: nonlabored ventilation, acceptable and nasal cannula  Hydration status: acceptable

## 2024-08-28 NOTE — Clinical Note
Replaced previous sheath in the right femoral vein. 8FR SHEATH EXCHANGED FOR FARADRIVE SHEATH WITH VERSACROSS AND Aditazz WIRE

## 2024-08-28 NOTE — H&P
[facility] Electrophysiology    Date of Hospital Visit: 24    Place of Service: [facility]    Patient Name: David Jean Baptiste  :1960      Primary Care Provider: Deniz Chang MD    Chief complaint: Atrial fibrillation      Problem List:  Active Hospital Problems    Diagnosis  POA    **Atrial fibrillation and flutter [I48.91, I48.92]  Unknown     Priority: High     [id number] 2  ARIANE with ECV,   ECV, 2023  Catheter ablation for atrial fibrillation (PVI) 2023      Chronic systolic heart failure [I50.22]  Yes     Priority: Medium     Transesophageal echo, 2023: Left ventricular systolic function is mildly decreased. Left ventricular ejection fraction appears to be 41 - 45%.   Echo, 2024: Left ventricular systolic function is normal. Left ventricular ejection fraction appears to be 56 - 60%.       Chronic anticoagulation [Z79.01]  Not Applicable     Priority: Low    Primary hypertension [I10]  Yes     Priority: Low       History of Present Illness:  This is a 63 y.o. patient recently evaluated in the electrophysiology clinic for persistent atrial fibrillation.  Symptoms include awareness of palpitations, dizziness, shortness of breath.          No Known Allergies    Current Outpatient Medications   Medication Instructions    furosemide (LASIX) 40 mg, Oral, Daily PRN    lisinopril (PRINIVIL,ZESTRIL) 5 mg, Oral, Daily    lovastatin (ALTOPREV) 40 mg, Oral, Nightly    metoprolol tartrate (LOPRESSOR) 25 mg, Oral, Every 12 Hours Scheduled    Multiple Vitamins-Minerals (PRESERVISION AREDS 2 PO) 2 tablets, Oral, Daily    multivitamin (THERAGRAN) tablet tablet 1 tablet, Oral, Daily    omeprazole (PRILOSEC) 20 mg, Oral, Daily    potassium chloride ER (K-TAB) 20 MEQ tablet controlled-release ER tablet 20 mEq, Oral, Daily PRN    primidone (MYSOLINE) 250 mg, Oral, Nightly    rivaroxaban (XARELTO) 20 mg, Oral, Daily With Dinner    sertraline (ZOLOFT) 100 mg,  Oral, Daily    Turmeric 500 MG capsule 1 capsule, Oral, Daily    vitamin B-12 (CYANOCOBALAMIN) 500 mcg, Oral, Daily, OTC           Social History     Socioeconomic History    Marital status:    Tobacco Use    Smoking status: Never     Passive exposure: Never    Smokeless tobacco: Never   Vaping Use    Vaping status: Never Used   Substance and Sexual Activity    Alcohol use: Not Currently     Comment: rare    Drug use: No    Sexual activity: Not Currently     Partners: Female     Birth control/protection: Condom, None, Hysterectomy       Family History   Problem Relation Age of Onset    Dementia Mother     Migraines Mother     No Known Problems Father     Asthma Brother        REVIEW OF SYSTEMS:   Constitutional: Positive for malaise/fatigue. Negative for diaphoresis and weight gain.   Cardiovascular:  Positive for palpitations. Negative for chest pain, claudication, dyspnea on exertion, irregular heartbeat, leg swelling, orthopnea, paroxysmal nocturnal dyspnea and syncope.   Respiratory:  Positive for shortness of breath. Negative for cough, sleep disturbances due to breathing and wheezing.    Hematologic/Lymphatic: Negative for bleeding problem.   Musculoskeletal:  Negative for muscle cramps, muscle weakness and myalgias.   Gastrointestinal:  Negative for heartburn.   Neurological:  Negative for weakness.            Objective:  146/87, HR 53, Sat 99%, T 96.6    EXAM:  Constitutional:       Appearance: Well-developed.   Pulmonary:      Effort: Pulmonary effort is normal. No respiratory distress.      Breath sounds: Normal breath sounds. No wheezing. No rales.      Comments: Bases clear  Chest:      Chest wall: Not tender to palpatation.   Cardiovascular:      Normal rate. regular rhythm.      Murmurs: There is no murmur.      No gallop.  No click. No rub.   Pulses:     Intact distal pulses.   Edema:     Peripheral edema absent.   Musculoskeletal: Normal range of motion.     Lab Review:               CrCl  cannot be calculated (Patient's most recent lab result is older than the maximum 30 days allowed.).  Lab Results   Component Value Date    INR 1.21 (H) 09/01/2023    PROTIME 15.5 (H) 09/01/2023        Assessment:   Persistent atrial fibrillation        Plan:   Atrial fibrillation ablation          BRODIE Whalen

## 2024-08-28 NOTE — Clinical Note
A 8 fr sheath was successfully inserted with ultrasound guidance into the right femoral vein. Sheath insertion not delayed.

## 2024-08-28 NOTE — Clinical Note
Hemostasis started on the right femoral vein. Vascade MVP was used in achieving hemostasis. Closure device deployed in the vessel. Hemostasis achieved successfully. Closure device additional comment: X2 VASCADE  X1 VASCADE XL

## 2024-08-28 NOTE — ANESTHESIA PROCEDURE NOTES
Airway  Urgency: elective    Date/Time: 8/28/2024 8:43 AM  Airway not difficult    General Information and Staff    Patient location during procedure: OR  CRNA/CAA: Lupe Burch CRNA    Indications and Patient Condition  Indications for airway management: airway protection    Preoxygenated: yes  MILS not maintained throughout  Mask difficulty assessment: 1 - vent by mask    Final Airway Details  Final airway type: endotracheal airway      Successful airway: ETT  Cuffed: yes   Successful intubation technique: direct laryngoscopy  Endotracheal tube insertion site: oral  Blade: Fong  Blade size: 2  ETT size (mm): 7.5  Cormack-Lehane Classification: grade I - full view of glottis  Placement verified by: chest auscultation and capnometry   Cuff volume (mL): 8  Measured from: lips  ETT/EBT  to lips (cm): 21  Number of attempts at approach: 1  Assessment: lips, teeth, and gum same as pre-op and atraumatic intubation    Additional Comments  Negative epigastric sounds, Breath sound equal bilaterally with symmetric chest rise and fall  Edentulous

## 2024-08-28 NOTE — Clinical Note
A 11 fr sheath was successfully inserted with ultrasound guidance into the right femoral vein. Sheath insertion not delayed.

## 2024-08-29 ENCOUNTER — CALL CENTER PROGRAMS (OUTPATIENT)
Dept: CALL CENTER | Facility: HOSPITAL | Age: 64
End: 2024-08-29
Payer: COMMERCIAL

## 2024-08-29 LAB
QT INTERVAL: 452 MS
QTC INTERVAL: 436 MS

## 2024-08-29 NOTE — OP NOTE
"          Cardiac Electrophysiology Procedure Note           [facility]         CATHETER ABLATION FOR ATRIAL FIBRILLATION (PVI)    PROCEDURES PERFORMED:    Catheter ablation of persistent atrial fibrillation  Adjunctive ablation of left atrial roof for persistent atrial fibrillation  Adjunctive ablation of the left atrial inferior wall for persistent atrial fibrillation  Adjunctive ablation of left atrial posterior wall for persistent atrial fibrillation  Catheter ablation of SVT #1 typical right atrial flutter  Catheter ablation of SVT #2 focal atrial tachycardia arising from the right atrial inferior free wall  Full diagnostic EP study  3D electroanatomic mapping  drug infusion / programmed pacing  Intracadiac Echocardiography  transeptal puncture  Left ventricular pacing and recording    PREPROCEDURAL DIAGNOSES:    1.  Persistent atrial fibrillation    2.  Typical atrial flutter    POST PROCEDURE DIANGOSES:  As above.    INDICATION FOR PROCEDURE:  Briefly, David Jean Baptiste is a 63 y.o. year old male with a history of highly symptomatic recurrent persistent atrial fibrillation as well as typical atrial flutter.    ANTICOAGULATION STRATEGY PRIOR TO AND POST PROCEDURE: DOAC.  The last dose of anticoagulant was confirmed to have been taken this morning.      PT/INR:  No results found for: \"LABPROT\", \"INR\"  PTT:  No results found for: \"APTT\"    CBC:   WBC   Date Value Ref Range Status   08/28/2024 3.72 3.40 - 10.80 10*3/mm3 Final     RBC   Date Value Ref Range Status   08/28/2024 4.69 4.14 - 5.80 10*6/mm3 Final     Hemoglobin   Date Value Ref Range Status   08/28/2024 13.8 13.0 - 17.7 g/dL Final     Hematocrit   Date Value Ref Range Status   08/28/2024 43.3 37.5 - 51.0 % Final     MCV   Date Value Ref Range Status   08/28/2024 92.3 79.0 - 97.0 fL Final     RDW   Date Value Ref Range Status   08/28/2024 12.5 12.3 - 15.4 % Final     Platelets   Date Value Ref Range Status " "  08/28/2024 200 140 - 450 10*3/mm3 Final     BMP:     Sodium   Date Value Ref Range Status   08/28/2024 143 136 - 145 mmol/L Final     Potassium   Date Value Ref Range Status   08/28/2024 4.1 3.5 - 5.2 mmol/L Final     Chloride   Date Value Ref Range Status   08/28/2024 106 98 - 107 mmol/L Final     CO2   Date Value Ref Range Status   08/28/2024 28.0 22.0 - 29.0 mmol/L Final     BUN   Date Value Ref Range Status   08/28/2024 10 8 - 23 mg/dL Final     Creatinine   Date Value Ref Range Status   08/28/2024 0.80 0.76 - 1.27 mg/dL Final     eGFR   Date Value Ref Range Status   08/28/2024 99.4 >60.0 mL/min/1.73 Final       Vital Signs: BP (!) 161/105 Comment: Post ambulation  Pulse 63   Temp 97.1 °F (36.2 °C) (Temporal)   Resp 19   Ht 193 cm (76\")   Wt 101 kg (221 lb 12.8 oz)   SpO2 100%   BMI 27.00 kg/m²      Admit Weight:  101 kg (221 lb 12.8 oz)  BMI: Body mass index is 27 kg/m².    PROCEDURE NARRATIVE:    The patient was able to give written informed consent after revisiting the key portions of the risk versus benefit profile of the procedure.  This discussion was framed by our lengthy conversations  (please see our detailed notes).  Patient verbalized strong understanding of this discussion and a strong desire to proceed with the procedure.  Please note that this detailed informed consent process utilized mutual and shared decision making process between all parties involved, principally the physician and patient, but also potentially with input from the patient's selected family and friends.    The patient was brought to the EP laboratory in the post absorptive state.  The patient was electively intubated for the procedure and given a general anesthetic by colleagues from anesthesia.  Please see the detailed anesthesia records.    The patient was then prepared and draped in a routing sterile fashion.  Seldinger access was obtained at the bilateral common femoral veins with 3 venipunctures.  J tip wires were " advanced into the vascular space.  Short 11, 8 and a long  sheath were placed into the bilateral common femoral veins and the inferior vena cava / right atrium in an over the wire fashion.    The patient was anticoagulated with intravenous heparin (initial bolus and then continuous infusion) with a goal ACT of between 350 and 400 seconds.  A phased array ICE catheter was placed into the right atrium and right ventricle.  This was used for transeptal puncture, monitoring of the pericardial space, monitoring of the ablation catheter position within the heart and monitoring of other cardiac structures such as the left atrial appendage (to document the absence of thrombus), pulmonary veins, esophagus, mitral valve annulus and other cardiac structures.    Kimble-septal puncture was performed after heparin administration with a combination of echocardiographic and fluoroscopic guidance.  This was performed with the long sheath, a BRK needle, and a SafeSept transeptal wire.  Catheters and sheaths were advanced safely into the left atrium.  A multielectrode electrophysiology catheter was used for pacing and recording in the left atrium, left atrial appendage, pulmonary veins and left ventricle at various times throughout the procedure.  We used the Interior Define 3D electroanatomic mapping system in conjunction with these catheters to construct an electroanatomic shell of the left atrium, left atrial appendage, mitral valve annulus, interatrial septum and pulmonary veins.  Left ventricular pacing and recording were performed by placing catheters safely and carefully across the mitral valve annulus to the left ventricle from the left atrium.  Adequate sensing and pacing thresholds were obtained from the left ventricle.  AV conduction ( antegrade ) as well as VA conduction ( retrograde ) were studied.  This was performed as a distinct addition to the diagnostic EP study.  Findings were conclusive for no accessory pathway and VA  dissociation.    High density of the left atrial mapping of the left atrium was performed.  This revealed patchy atrial myopathy and moderate left atrial enlargement    Catheter ablation was performed to achieve pulmonary vein isolation.  A wide antral circumferential ablation approach was used.  Additional lesions were given within the antral lesion set at the rhea between superior and inferior veins to achieve total isolation, when and where necessary.      The patient remained in atrial fibrillation.  Adjunctive ablation was performed to achieve isolation of the left atrial roof connecting the left superior to the right superior pulmonary veins.    The patient remained in atrial fibrillation.  Additional adjunctive ablation was performed to achieve isolation of the left atrial Fery wall connecting the left inferior to the right inferior pulmonary veins.    The patient remained in atrial fibrillation.  Additional adjunctive ablation was performed to isolate the entire left atrial posterior wall.    After completing the antral ablation lesion set, I interrogated the pulmonary veins using and documented pulmonary vein isolation.    We turned our attention to performing typical atrial flutter ablation.  Please note that this is a separate SVT for which he was SVT #1 with a distinct mechanism from the patients atrial fibrillation.  Ablation was performed along the cavo tricuspid isthmus beginning at the ventricular aspect in extended to the caval aspect of the cavo tricuspid isthmus.  We then demonstrated that there was bidirectional block with differential pacing maneuvers.     Second SVT ensued this refer to his SVT #2.  This was paroxysmal.  This was mapped as a separate tachycardia.  This was noted to originate from the lateral/anterior aspect of the inferior right atrium.  This area was ablated.    Adenosine 12 mg was administered intravenously following ablation to test for other atrial and or ventricular  arrhythmias.   Programmed stimulation was performed in an attempt to induce other and additional arrhythmias.  No arrhythmias were induced during administration and washout.    The ICE catheter revealed that there was no pericardial effusion.    Catheters and sheaths were then removed from the body.    Hemostasis was achieved with figure of 8 suture.  Bed rest for three hours please.    The patient was extubated in routine fashion and transferred to recovery in stable condition.    COMPLICATIONS: None    EBL: minimal    KEY PROCEDURAL FINDINGS:  Wide antral circumferential pulmonary and isolation, ablation of left atrial roof, left atrial inferior, left atrial posterior wall cavotricuspid isthmus ablation for typical flutter as well as deletion of organized atrial tachycardia arising from the right atrial free wall.  Modest left atrial myopathy and left atrial enlargement    POST PROCEDURAL PLAN:    Report was called the the recovery nurse responsible for the patients care.  Uninterrupted anticoagulation for not less than 90 days unless specially instructed otherwise by myself or another member of our EP physician team.  Please note that the patient and the patient’s family have been extensively counseled about this critical requirement and have agreed to comply.  Anticipate discharge this day.  Medications were reconciled, and key changes in medications include: Stop antiarrhythmic drug  the patient will be seen at our office per routine follow up.      Ryan Shaikh DO, FACC, RS  Cardiac Electrophysiologist  [facility] / [facility]

## 2024-08-29 NOTE — OUTREACH NOTE
PCI/Device Survey      Flowsheet Row Responses   Facility patient discharged from? [place]   Procedure date 08/28/24   Procedure (if device, specify in description) Ablation   Performing MD Dr. Ryan Shaikh   Attempt successful? Yes   Call start time 1020   Call end time 1024   Has the patient had any of the following symptoms since discharge? --  [no issues- does state slight SOA but was told that was normal. Advised Pt if SOA continued or  worsened to call cardio office. Pt VU]   Is the patient taking prescribed medications: --  [Xarelto]   Nursing intervention Reminded to continue to take prescribed medications   Does the patient have any of the following symptoms related to the cath/surgical site? --  [Right groin site- dressing intact. no issues]   Does the patient have an appointment scheduled with the cardiologist? Yes  [Cardio 9/24/24 315 pm.]   Appointment comments Dr. Shaikh 12/2/24 1130 am   If the patient is a current smoker, are they able to teach back resources for cessation? Not a smoker   Did the patient feel prepared to go home on the same day as the procedure? Yes   Is the patient satisfied with the same day discharge process? Yes   PCI/Device call completed Yes   Wrap up additional comments No issues at site. No questions.            LEONIE PARDO - Registered Nurse

## 2024-08-31 RX ORDER — RIVAROXABAN 20 MG/1
TABLET, FILM COATED ORAL
Qty: 30 TABLET | Refills: 5 | Status: CANCELLED | OUTPATIENT
Start: 2024-08-31

## 2024-09-24 ENCOUNTER — OFFICE VISIT (OUTPATIENT)
Dept: CARDIOLOGY | Facility: HOSPITAL | Age: 64
End: 2024-09-24
Payer: COMMERCIAL

## 2024-09-24 VITALS
RESPIRATION RATE: 18 BRPM | BODY MASS INDEX: 27.79 KG/M2 | TEMPERATURE: 96.7 F | WEIGHT: 228.2 LBS | DIASTOLIC BLOOD PRESSURE: 79 MMHG | OXYGEN SATURATION: 97 % | HEART RATE: 83 BPM | HEIGHT: 76 IN | SYSTOLIC BLOOD PRESSURE: 148 MMHG

## 2024-09-24 DIAGNOSIS — I48.92 ATRIAL FIBRILLATION AND FLUTTER: Primary | ICD-10-CM

## 2024-09-24 DIAGNOSIS — I10 PRIMARY HYPERTENSION: ICD-10-CM

## 2024-09-24 DIAGNOSIS — I48.91 ATRIAL FIBRILLATION AND FLUTTER: Primary | ICD-10-CM

## 2024-09-24 PROCEDURE — 99214 OFFICE O/P EST MOD 30 MIN: CPT | Performed by: NURSE PRACTITIONER

## 2024-11-15 ENCOUNTER — OFFICE VISIT (OUTPATIENT)
Dept: NEUROLOGY | Facility: CLINIC | Age: 64
End: 2024-11-15
Payer: COMMERCIAL

## 2024-11-15 VITALS — SYSTOLIC BLOOD PRESSURE: 132 MMHG | HEART RATE: 78 BPM | OXYGEN SATURATION: 97 % | DIASTOLIC BLOOD PRESSURE: 94 MMHG

## 2024-11-15 DIAGNOSIS — R25.1 TREMOR: Primary | ICD-10-CM

## 2024-11-15 PROCEDURE — 99214 OFFICE O/P EST MOD 30 MIN: CPT | Performed by: PSYCHIATRY & NEUROLOGY

## 2024-11-15 RX ORDER — PRIMIDONE 50 MG/1
150 TABLET ORAL EVERY MORNING
Qty: 270 TABLET | Refills: 1 | Status: SHIPPED | OUTPATIENT
Start: 2024-11-15 | End: 2024-12-15

## 2024-11-15 RX ORDER — PRIMIDONE 250 MG/1
250 TABLET ORAL NIGHTLY
Qty: 90 TABLET | Refills: 1 | Status: SHIPPED | OUTPATIENT
Start: 2024-11-15 | End: 2025-02-13

## 2024-11-15 NOTE — PROGRESS NOTES
Subjective:    CC: David Jean Baptiste is in clinic today for follow up for history of benign essential tremors.    HPI:  Initial visit: 3/16/2024: Patient is a 63-year-old male with past medical history of hypertension, hyperlipidemia, paroxysmal A-fib on Xarelto referred to the clinic for evaluation of tremors.he reports that he has had tremors for over 40 years and it has slowly and progressively become worse.  He reports tremors while holding cup of coffee or glass of water.  He also notes difficulty using spoon and fork.  He also finds it difficult to write because of tremors.  He reports family history of tremors in his father.  He denies any resting tremors.  Denies any problems with walking or balance.  He has never tried any medications to help reduce intensity or frequency of these tremors.  He reports that tremors usually gets worse when he is anxious or stressed.    Follow-up: 5/13/2024: He is in clinic for regular follow-up.  Since his initial visit in March 2024, he reports that he is now taking primidone 200 mg at bedtime and he has had good response with 50 to 60% reduction in intensity of tremors.  He is tolerating primidone well without any side effects.    Follow-up: 11/15/2024: He is in clinic for regular follow-up.  Since his last visit in May 2024, he has been taking primidone to 50 mg at bedtime.  He reports that's higher dose of primidone has helped slightly better but he still has days when tremors are worse.  He denies any side effects with primidone use.  He was also diagnosed with A-fib since his last visit and currently is on Xarelto 10 mg daily.    The following portions of the patient's history were reviewed and updated as of 11/15/2024: allergies, social history, and problem list.       Current Outpatient Medications:     furosemide (LASIX) 40 MG tablet, Take 1 tablet by mouth Daily As Needed (edema, shortness of breath)., Disp: , Rfl:     lisinopril (PRINIVIL,ZESTRIL) 5 MG tablet,  Take 1 tablet by mouth Daily., Disp: 90 tablet, Rfl: 1    lovastatin (ALTOPREV) 40 MG 24 hr tablet, Take 1 tablet by mouth Every Night., Disp: , Rfl:     metoprolol tartrate (LOPRESSOR) 25 MG tablet, Take 1 tablet by mouth Every 12 (Twelve) Hours., Disp: 180 tablet, Rfl: 3    Multiple Vitamins-Minerals (PRESERVISION AREDS 2 PO), Take 2 tablets by mouth Daily., Disp: , Rfl:     multivitamin (THERAGRAN) tablet tablet, Take 1 tablet by mouth Daily., Disp: , Rfl:     omeprazole (priLOSEC) 20 MG capsule, Take 1 capsule by mouth Daily., Disp: , Rfl:     potassium chloride ER (K-TAB) 20 MEQ tablet controlled-release ER tablet, Take 1 tablet by mouth Daily As Needed (when taking lasix)., Disp: , Rfl:     primidone (MYSOLINE) 250 MG tablet, Take 1 tablet by mouth Every Night for 90 days., Disp: 90 tablet, Rfl: 1    rivaroxaban (Xarelto) 10 MG tablet, Take 2 tablets by mouth Daily With Dinner. Indications: Atrial Fibrillation, Disp: 180 tablet, Rfl: 3    sertraline (ZOLOFT) 100 MG tablet, Take 1 tablet by mouth Daily., Disp: , Rfl:     Turmeric 500 MG capsule, Take 1 capsule by mouth Daily., Disp: , Rfl:     vitamin B-12 (CYANOCOBALAMIN) 500 MCG tablet, Take 1 tablet by mouth Daily. OTC, Disp: , Rfl:     primidone (MYSOLINE) 50 MG tablet, Take 3 tablets by mouth Every Morning for 30 days., Disp: 270 tablet, Rfl: 1   Past Medical History:   Diagnosis Date    Anxiety     Arthritis     Atrial fibrillation 9/1/23    Coarse tremors     GERD (gastroesophageal reflux disease)     History of migraine     Hyperlipidemia     Wears dentures     full plate upper , lower, removable implant    Wears glasses       Past Surgical History:   Procedure Laterality Date    ABLATION OF DYSRHYTHMIC FOCUS  12/6/23    CARDIAC ELECTROPHYSIOLOGY PROCEDURE N/A 12/06/2023    Procedure: Ablation atrial fibrillation; Rythmia; general anesthesia; DNS NOAC;  Surgeon: Ryan Shaikh DO;  Location: [facility] INVASIVE LOCATION;  Service: Cardiovascular;   Laterality: N/A;    CARDIAC ELECTROPHYSIOLOGY PROCEDURE N/A 8/28/2024    Procedure: PVA, Rythmia, PFA, no meds to hold;  Surgeon: Ryan hSaikh DO;  Location: [facility] INVASIVE LOCATION;  Service: Cardiovascular;  Laterality: N/A;    CARDIOVERSION      CIRCUMCISION      COLONOSCOPY      with polypectomy    DENTAL PROCEDURE Bilateral     implants    EYE SURGERY Bilateral     cataract      Family History   Problem Relation Age of Onset    Dementia Mother     Migraines Mother     No Known Problems Father     Asthma Brother         Review of Systems  Objective:    /94   Pulse 78   SpO2 97%     Neurology Exam:  General apperance: NAD.     Mental status: Alert, awake and oriented to time place and person.    Language and Speech: No aphasia or dysarthria.    CN II to XII: Intact.    Opthalmoscopic Exam: No papilledema.    Motor:  Right UE muscle strength 5/5. Normal tone.     Left UE muscle strength 5/5. Normal tone.      Right LE muscle strength 5/5. Normal tone.     Left LE muscle strength 5/5. Normal tone.      Sensory: Normal light touch, vibration and pinprick sensation bilaterally.    DTRs: 2+ bilaterally.    Babinski: Negative bilaterally.    Co-ordination: Normal finger-to-nose, heel to shin B/L.    Bilateral action and postural tremors noted-worse on the left than on the right.    Rhomberg: Negative.    Gait: Normal.    Cardiovascular: Regular rate and rhythm without murmur, gallop or rub.    Assessment and Plan:  1. Tremor  -Benign essential tremors.  Since he does not have any side effects with primidone, I will be introducing primidone in the morning starting at 50 mg dose and slowly increasing to 150 mg dose.  Will continue with 250 mg dose at bedtime.  I have advised her to call office with any questions or concerns he may have otherwise I will see him back in clinic in 6 months for follow-up.       I spent 30 minutes in patient care: Reviewing records prior to the visit, entering orders and  documentation and spent more than wagner 50% of this time face-to-face in management, instructions and education regarding above mentioned diagnosis and also on counseling and discussing about taking medication regularly, possible side effects with medication use, importance of good sleep hygiene, good hydration and regular exercise.    Return in about 6 months (around 5/15/2025).       Note to patient: The 21st Century Cures Act makes medical notes like these available to patients in the interest of transparency. However, be advised this is a medical document. It is intended as peer to peer communication. It is written in medical language and may contain abbreviations or verbiage that are unfamiliar. It may appear blunt or direct. Medical documents are intended to carry relevant information, facts as evident, and the clinical opinion of the physician.

## 2024-12-02 ENCOUNTER — OFFICE VISIT (OUTPATIENT)
Dept: CARDIOLOGY | Facility: CLINIC | Age: 64
End: 2024-12-02
Payer: COMMERCIAL

## 2024-12-02 VITALS
DIASTOLIC BLOOD PRESSURE: 80 MMHG | HEART RATE: 92 BPM | HEIGHT: 76 IN | SYSTOLIC BLOOD PRESSURE: 108 MMHG | OXYGEN SATURATION: 99 % | WEIGHT: 226.2 LBS | BODY MASS INDEX: 27.54 KG/M2

## 2024-12-02 DIAGNOSIS — I48.19 ATRIAL FIBRILLATION, PERSISTENT: Primary | ICD-10-CM

## 2024-12-02 PROCEDURE — 99214 OFFICE O/P EST MOD 30 MIN: CPT | Performed by: INTERNAL MEDICINE

## 2024-12-02 NOTE — PROGRESS NOTES
Cardiac Electrophysiology Outpatient Follow Up Note            [facility] at [facility]    Follow Up Office Visit      David Jean Baptiste  [id number]  12/02/2024  [unfilled]  [unfilled]    Primary Care Physician: Deniz Chang MD    Referred By: No ref. provider found    Subjective     Chief Complaint:   Diagnoses and all orders for this visit:    1. Atrial fibrillation, persistent (Primary)      Chief Complaint   Patient presents with    Chronic systolic heart failure       History of Present Illness:   David Jean Baptiste is a 64 y.o. male who presents to my electrophysiology clinic for follow up of above.      Past Medical History:   Past Medical History:   Diagnosis Date    Anxiety     Arthritis     Atrial fibrillation 9/1/23    Coarse tremors     GERD (gastroesophageal reflux disease)     History of migraine     Hyperlipidemia     Wears dentures     full plate upper , lower, removable implant    Wears glasses        Past Surgical History:   Past Surgical History:   Procedure Laterality Date    ABLATION OF DYSRHYTHMIC FOCUS  12/6/23    CARDIAC ELECTROPHYSIOLOGY PROCEDURE N/A 12/06/2023    Procedure: Ablation atrial fibrillation; Rythmia; general anesthesia; DNS NOAC;  Surgeon: Ryan Shaikh DO;  Location:  [facility];  Service: Cardiovascular;  Laterality: N/A;    CARDIAC ELECTROPHYSIOLOGY PROCEDURE N/A 8/28/2024    Procedure: PVA, Rythmia, PFA, no meds to hold;  Surgeon: Ryan Shaikh DO;  Location:  [facility];  Service: Cardiovascular;  Laterality: N/A;    CARDIOVERSION      CIRCUMCISION      COLONOSCOPY      with polypectomy    DENTAL PROCEDURE Bilateral     implants    EYE SURGERY Bilateral     cataract       Family History:   Family History   Problem Relation Age of Onset    Dementia Mother     Migraines Mother     No Known Problems Father     Asthma Brother        Social History:   Social History     Socioeconomic  History    Marital status:    Tobacco Use    Smoking status: Never     Passive exposure: Never    Smokeless tobacco: Never   Vaping Use    Vaping status: Never Used   Substance and Sexual Activity    Alcohol use: Not Currently     Comment: rare    Drug use: Never    Sexual activity: Not Currently     Partners: Female     Birth control/protection: Condom, None, Hysterectomy       Medications:     Current Outpatient Medications:     furosemide (LASIX) 40 MG tablet, Take 1 tablet by mouth Daily As Needed (edema, shortness of breath)., Disp: , Rfl:     lisinopril (PRINIVIL,ZESTRIL) 5 MG tablet, Take 1 tablet by mouth Daily., Disp: 90 tablet, Rfl: 1    lovastatin (ALTOPREV) 40 MG 24 hr tablet, Take 1 tablet by mouth Every Night., Disp: , Rfl:     metoprolol tartrate (LOPRESSOR) 25 MG tablet, Take 1 tablet by mouth Every 12 (Twelve) Hours., Disp: 180 tablet, Rfl: 3    Multiple Vitamins-Minerals (PRESERVISION AREDS 2 PO), Take 2 tablets by mouth Daily., Disp: , Rfl:     multivitamin (THERAGRAN) tablet tablet, Take 1 tablet by mouth Daily., Disp: , Rfl:     omeprazole (priLOSEC) 20 MG capsule, Take 1 capsule by mouth Daily., Disp: , Rfl:     potassium chloride ER (K-TAB) 20 MEQ tablet controlled-release ER tablet, Take 1 tablet by mouth Daily As Needed (when taking lasix)., Disp: , Rfl:     primidone (MYSOLINE) 250 MG tablet, Take 1 tablet by mouth Every Night for 90 days., Disp: 90 tablet, Rfl: 1    primidone (MYSOLINE) 50 MG tablet, Take 3 tablets by mouth Every Morning for 30 days., Disp: 270 tablet, Rfl: 1    sertraline (ZOLOFT) 100 MG tablet, Take 1 tablet by mouth Daily., Disp: , Rfl:     Turmeric 500 MG capsule, Take 1 capsule by mouth Daily., Disp: , Rfl:     vitamin B-12 (CYANOCOBALAMIN) 500 MCG tablet, Take 1 tablet by mouth Daily. OTC, Disp: , Rfl:     Allergies:   No Known Allergies    Objective   Vital Signs:   Vitals:    12/02/24 1049   BP: 108/80   BP Location: Left arm   Patient Position: Sitting  "  Pulse: 92   SpO2: 99%   Weight: 103 kg (226 lb 3.2 oz)   Height: 193 cm (76\")       PHYSICAL EXAM  General appearance: Awake, alert, cooperative  Head: Normocephalic, without obvious abnormality, atraumatic  Eyes: Conjunctivae/corneas clear, EOMs intact  Neck: no adenopathy, no carotid bruit, no JVD, and thyroid: not enlarged  Lungs: clear to auscultation bilaterally and no rhonchi or crackles\", ' symmetric  Heart: regular rate and rhythm, S1, S2 normal, no murmur, click, rub or gallop  Abdomen: Soft, non-tender, bowel sounds normal,  no organomegaly  Extremities: extremities normal, atraumatic, no cyanosis or edema  Skin: Skin color, turgor normal, no rashes or lesions  Neurologic: Grossly normal     Lab Results   Component Value Date    GLUCOSE 71 08/28/2024    CALCIUM 9.3 08/28/2024     08/28/2024    K 4.1 08/28/2024    CO2 28.0 08/28/2024     08/28/2024    BUN 10 08/28/2024    CREATININE 0.80 08/28/2024    EGFRIFNONA 69 09/29/2017    BCR 12.5 08/28/2024    ANIONGAP 9.0 08/28/2024     Lab Results   Component Value Date    WBC 3.72 08/28/2024    HGB 13.8 08/28/2024    HCT 43.3 08/28/2024    MCV 92.3 08/28/2024     08/28/2024     Lab Results   Component Value Date    INR 1.21 (H) 09/01/2023    PROTIME 15.5 (H) 09/01/2023     Lab Results   Component Value Date    TSH 4.160 10/10/2023       Cardiac Testing:     I personally viewed and interpreted the patient's EKG/Telemetry/lab data    Procedures    Tobacco Cessation: N/A  Obstructive Sleep Apnea Screening: Completed    Advance Care Planning   ACP discussion was declined by the patient. Patient does not have an advance directive, declines further assistance.       Assessment & Plan    Diagnoses and all orders for this visit:    1. Atrial fibrillation, persistent (Primary)         Diagnosis Plan   1. Atrial fibrillation, persistent  Status post extensive catheter ablation with total elimination of symptoms.  Doing quite well.    [id number] will " be 1 at his 65th birthday.  That said he is leaning towards discontinuing his anticoagulation but we will reevaluate at the next follow-up visit with me.  Stay on anticoagulation for now.        Body mass index is 27.53 kg/m².    I spent 38 minutes in consultation with this patient which included more than 65% of this time in direct face-to-face counseling, physical examination and discussion of my assessment and findings and this shared decision making with the patient.  The remainder of the time not spent face-to-face was performing one, some or all of the following actions: preparing to see the patient (e.g. reviewing tests, prior clinicians' notes, etc), ordering medications, tests or procedures, coordination of care, discussion of the plan with other healthcare providers, documenting clinical information in epic as well as independently interpreting results and communication of these results to the patient family and/or caregiver(s).  Please note that this explicitly excludes time spent on other separate billable services such as performing procedures or test interpretation, when applicable.      Follow Up:       Thank you for allowing me to participate in the care of your patient. Please to not hesitate to contact me with additional questions or concerns.      Ryan Shaikh, DO, FACC, RS  Cardiac Electrophysiologist  [facility] / [facility]

## 2024-12-04 ENCOUNTER — TELEPHONE (OUTPATIENT)
Dept: CARDIOLOGY | Facility: CLINIC | Age: 64
End: 2024-12-04

## 2024-12-04 NOTE — TELEPHONE ENCOUNTER
Caller: David Jean Baptiste    Relationship: Self    Best call back number: [phone]    Which medication are you concerned about: DABIGATRAN    Who prescribed you this medication: NOT PRESCRIBED YET    When did you start taking this medication: HAS NOT STARTED YET    What are your concerns: PT WAS GIVEN A LIST OF MEDICATIONS TO CHECK WITH HIS INSURANCE. HE HAS DECIDED TO MOVE FORWARD WITH THE DABIGATRAN. PLEASE REACH OUT TO PT TO ADVISE.

## 2024-12-10 RX ORDER — DABIGATRAN ETEXILATE 150 MG/1
150 CAPSULE ORAL 2 TIMES DAILY
Qty: 60 CAPSULE | Refills: 5 | Status: SHIPPED | OUTPATIENT
Start: 2024-12-10

## 2024-12-10 NOTE — TELEPHONE ENCOUNTER
Order placed in [facility] and sent to patient's pharmacy of choice, [facility]. Called patient to make aware. No questions at this time.

## 2025-03-03 ENCOUNTER — OFFICE VISIT (OUTPATIENT)
Dept: CARDIOLOGY | Facility: CLINIC | Age: 65
End: 2025-03-03
Payer: COMMERCIAL

## 2025-03-03 VITALS
WEIGHT: 225 LBS | BODY MASS INDEX: 27.4 KG/M2 | SYSTOLIC BLOOD PRESSURE: 130 MMHG | OXYGEN SATURATION: 95 % | HEART RATE: 86 BPM | HEIGHT: 76 IN | DIASTOLIC BLOOD PRESSURE: 98 MMHG

## 2025-03-03 DIAGNOSIS — Z79.01 CHRONIC ANTICOAGULATION: ICD-10-CM

## 2025-03-03 DIAGNOSIS — I10 PRIMARY HYPERTENSION: ICD-10-CM

## 2025-03-03 DIAGNOSIS — I48.91 ATRIAL FIBRILLATION AND FLUTTER: Primary | ICD-10-CM

## 2025-03-03 DIAGNOSIS — I48.92 ATRIAL FIBRILLATION AND FLUTTER: Primary | ICD-10-CM

## 2025-03-03 DIAGNOSIS — I50.22 CHRONIC SYSTOLIC HEART FAILURE: ICD-10-CM

## 2025-03-03 PROCEDURE — 99214 OFFICE O/P EST MOD 30 MIN: CPT | Performed by: PHYSICIAN ASSISTANT

## 2025-03-03 RX ORDER — LISINOPRIL 5 MG/1
5 TABLET ORAL DAILY
Qty: 90 TABLET | Refills: 1 | Status: SHIPPED | OUTPATIENT
Start: 2025-03-03

## 2025-03-03 RX ORDER — METOPROLOL TARTRATE 25 MG/1
25 TABLET, FILM COATED ORAL EVERY 12 HOURS SCHEDULED
Qty: 180 TABLET | Refills: 3 | Status: SHIPPED | OUTPATIENT
Start: 2025-03-03 | End: 2025-03-04 | Stop reason: SDUPTHER

## 2025-03-03 NOTE — PROGRESS NOTES
Cardiac Electrophysiology Outpatient Follow Up Note            [facility] at [facility]    Follow Up Office Visit      David Jean Baptiste  [id number]      Primary Care Physician: Deniz Chang MD        Subjective     Chief Complaint:   Diagnoses and all orders for this visit:    1. Atrial fibrillation and flutter (Primary)    2. Chronic anticoagulation    3. Primary hypertension    4. Chronic systolic heart failure    Other orders  -     metoprolol tartrate (LOPRESSOR) 25 MG tablet; Take 1 tablet by mouth Every 12 (Twelve) Hours.  Dispense: 180 tablet; Refill: 3  -     lisinopril (PRINIVIL,ZESTRIL) 5 MG tablet; Take 1 tablet by mouth Daily.  Dispense: 90 tablet; Refill: 1        Chief Complaint   Patient presents with    Atrial fibrillation, persistent       History of Present Illness:   David Jean Baptiste is a 64 y.o. male who presents to electrophysiology clinic for follow up of atrial fibrillation, nonischemic cardiomyopathy and hypertension.  Since last seen by our office he continues to do quite well.  He said no A-fib.  He denies any chest pain dizziness near syncope.  He ran out of his beta-blocker and Zestril so he stopped them.  He would like to refill these.  Otherwise he is doing well since the ablation procedure.    Past Medical History:   Past Medical History:   Diagnosis Date    Anxiety     Arthritis     Atrial fibrillation 9/1/23    Coarse tremors     GERD (gastroesophageal reflux disease)     History of migraine     Hyperlipidemia     Wears dentures     full plate upper , lower, removable implant    Wears glasses        Past Surgical History:   Past Surgical History:   Procedure Laterality Date    ABLATION OF DYSRHYTHMIC FOCUS  12/6/23    CARDIAC ELECTROPHYSIOLOGY PROCEDURE N/A 12/06/2023    Procedure: Ablation atrial fibrillation; Rythmia; general anesthesia; DNS NOAC;  Surgeon: Ryan Shaikh DO;  Location: [facility] INVASIVE LOCATION;   Service: Cardiovascular;  Laterality: N/A;    CARDIAC ELECTROPHYSIOLOGY PROCEDURE N/A 8/28/2024    Procedure: PVA, Rythmia, PFA, no meds to hold;  Surgeon: Ryna Shaikh DO;  Location: [facility] LOC[facility];  Service: Cardiovascular;  Laterality: N/A;    CARDIOVERSION      CIRCUMCISION      COLONOSCOPY      with polypectomy    DENTAL PROCEDURE Bilateral     implants    EYE SURGERY Bilateral     cataract       Family History:   Family History   Problem Relation Age of Onset    Dementia Mother     Migraines Mother     No Known Problems Father     Asthma Brother        Social History:   Social History     Socioeconomic History    Marital status:    Tobacco Use    Smoking status: Never     Passive exposure: Never    Smokeless tobacco: Never   Vaping Use    Vaping status: Never Used   Substance and Sexual Activity    Alcohol use: Not Currently     Comment: rare    Drug use: Never    Sexual activity: Not Currently     Partners: Female     Birth control/protection: Condom, None       Medications:     Current Outpatient Medications:     dabigatran etexilate (PRADAXA) 150 MG capsu, Take 1 capsule by mouth 2 (Two) Times a Day. Replacing Xarelto, Disp: 60 capsule, Rfl: 5    furosemide (LASIX) 40 MG tablet, Take 1 tablet by mouth Daily As Needed (edema, shortness of breath)., Disp: , Rfl:     lisinopril (PRINIVIL,ZESTRIL) 5 MG tablet, Take 1 tablet by mouth Daily., Disp: 90 tablet, Rfl: 1    lovastatin (ALTOPREV) 40 MG 24 hr tablet, Take 1 tablet by mouth Every Night., Disp: , Rfl:     metoprolol tartrate (LOPRESSOR) 25 MG tablet, Take 1 tablet by mouth Every 12 (Twelve) Hours., Disp: 180 tablet, Rfl: 3    Multiple Vitamins-Minerals (PRESERVISION AREDS 2 PO), Take 2 tablets by mouth Daily., Disp: , Rfl:     multivitamin (THERAGRAN) tablet tablet, Take 1 tablet by mouth Daily., Disp: , Rfl:     omeprazole (priLOSEC) 20 MG capsule, Take 1 capsule by mouth Daily., Disp: , Rfl:     potassium chloride ER (K-TAB) 20  "MEQ tablet controlled-release ER tablet, Take 1 tablet by mouth Daily As Needed (when taking lasix)., Disp: , Rfl:     primidone (MYSOLINE) 250 MG tablet, Take 1 tablet by mouth Every Night for 90 days., Disp: 90 tablet, Rfl: 1    primidone (MYSOLINE) 50 MG tablet, Take 3 tablets by mouth Every Morning for 30 days., Disp: 270 tablet, Rfl: 1    sertraline (ZOLOFT) 100 MG tablet, Take 1 tablet by mouth Daily., Disp: , Rfl:     Turmeric 500 MG capsule, Take 1 capsule by mouth Daily., Disp: , Rfl:     vitamin B-12 (CYANOCOBALAMIN) 500 MCG tablet, Take 1 tablet by mouth Daily. OTC, Disp: , Rfl:     Allergies:   No Known Allergies    Objective   Vital Signs:   Vitals:    03/03/25 1132   BP: 130/98   BP Location: Right arm   Patient Position: Sitting   Pulse: 86   SpO2: 95%   Weight: 102 kg (225 lb)   Height: 193 cm (76\")       PHYSICAL EXAM  General appearance: Awake, alert, cooperative  Head: Normocephalic, without obvious abnormality, atraumatic  Eyes: Conjunctivae/corneas clear, EOMs intact  Neck: no adenopathy, no carotid bruit, no JVD, and thyroid: not enlarged  Lungs: clear to auscultation bilaterally and no rhonchi or crackles\", ' symmetric  Heart: regular rate and rhythm, S1, S2 normal, no murmur, click, rub or gallop  Abdomen: Soft, non-tender, bowel sounds normal,  no organomegaly  Extremities: extremities normal, atraumatic, no cyanosis or edema  Skin: Skin color, turgor normal, no rashes or lesions  Neurologic: Grossly normal     Lab Results   Component Value Date    GLUCOSE 71 08/28/2024    CALCIUM 9.3 08/28/2024     08/28/2024    K 4.1 08/28/2024    CO2 28.0 08/28/2024     08/28/2024    BUN 10 08/28/2024    CREATININE 0.80 08/28/2024    EGFRIFNONA 69 09/29/2017    BCR 12.5 08/28/2024    ANIONGAP 9.0 08/28/2024     Lab Results   Component Value Date    WBC 3.72 08/28/2024    HGB 13.8 08/28/2024    HCT 43.3 08/28/2024    MCV 92.3 08/28/2024     08/28/2024     Lab Results   Component Value " Date    INR 1.21 (H) 09/01/2023    PROTIME 15.5 (H) 09/01/2023     Lab Results   Component Value Date    TSH 4.160 10/10/2023       Cardiac Testing:     I personally viewed and interpreted the patient's EKG/Telemetry/lab data    Procedures  NSR.   Tobacco Cessation: N/A  Obstructive Sleep Apnea Screening: Completed    Advance Care Planning   ACP discussion was declined by the patient. Patient does not have an advance directive, declines further assistance.       Assessment & Plan    Diagnoses and all orders for this visit:    1. Atrial fibrillation and flutter (Primary)    2. Chronic anticoagulation    3. Primary hypertension    4. Chronic systolic heart failure    Other orders  -     metoprolol tartrate (LOPRESSOR) 25 MG tablet; Take 1 tablet by mouth Every 12 (Twelve) Hours.  Dispense: 180 tablet; Refill: 3  -     lisinopril (PRINIVIL,ZESTRIL) 5 MG tablet; Take 1 tablet by mouth Daily.  Dispense: 90 tablet; Refill: 1           Diagnosis Plan   1. Atrial fibrillation, persistent  Status post extensive catheter ablation with total elimination of symptoms.  Doing quite well.  Maintain Pradaxa therapy.   2.  Nonischemic cardiomyopathy, would continue beta-blocker, Zestril therapy.   Stable course maintaining sinus rhythm.  Continue medications beta-blocker, Zestril for cardiomyopathy.  Return follow-up or office as scheduled.  Electronically signed by BRODIE Balbuena, 03/03/25, 12:52 PM EST.

## 2025-03-04 RX ORDER — METOPROLOL TARTRATE 25 MG/1
25 TABLET, FILM COATED ORAL EVERY 12 HOURS SCHEDULED
Qty: 180 TABLET | Refills: 3 | Status: SHIPPED | OUTPATIENT
Start: 2025-03-04

## 2025-05-16 ENCOUNTER — OFFICE VISIT (OUTPATIENT)
Dept: NEUROLOGY | Facility: CLINIC | Age: 65
End: 2025-05-16
Payer: COMMERCIAL

## 2025-05-16 VITALS — HEART RATE: 77 BPM | DIASTOLIC BLOOD PRESSURE: 100 MMHG | OXYGEN SATURATION: 97 % | SYSTOLIC BLOOD PRESSURE: 138 MMHG

## 2025-05-16 DIAGNOSIS — R25.1 TREMOR: Primary | ICD-10-CM

## 2025-05-16 PROCEDURE — 99214 OFFICE O/P EST MOD 30 MIN: CPT | Performed by: PSYCHIATRY & NEUROLOGY

## 2025-05-16 RX ORDER — PRIMIDONE 250 MG/1
250 TABLET ORAL 2 TIMES DAILY
Qty: 180 TABLET | Refills: 1 | Status: SHIPPED | OUTPATIENT
Start: 2025-05-16 | End: 2025-08-14

## 2025-05-16 NOTE — PROGRESS NOTES
Subjective:    CC: David Jean Baptiste is in clinic today for follow up for history of benign essential tremors.    HPI:  Initial visit: 3/16/2024: Patient is a 63-year-old male with past medical history of hypertension, hyperlipidemia, paroxysmal A-fib on Xarelto referred to the clinic for evaluation of tremors.he reports that he has had tremors for over 40 years and it has slowly and progressively become worse.  He reports tremors while holding cup of coffee or glass of water.  He also notes difficulty using spoon and fork.  He also finds it difficult to write because of tremors.  He reports family history of tremors in his father.  He denies any resting tremors.  Denies any problems with walking or balance.  He has never tried any medications to help reduce intensity or frequency of these tremors.  He reports that tremors usually gets worse when he is anxious or stressed.    Follow-up: 5/13/2024: He is in clinic for regular follow-up.  Since his initial visit in March 2024, he reports that he is now taking primidone 200 mg at bedtime and he has had good response with 50 to 60% reduction in intensity of tremors.  He is tolerating primidone well without any side effects.    Follow-up: 11/15/2024: He is in clinic for regular follow-up.  Since his last visit in May 2024, he has been taking primidone 250 mg at bedtime.  He reports that's higher dose of primidone has helped slightly better but he still has days when tremors are worse.  He denies any side effects with primidone use.  He was also diagnosed with A-fib since his last visit and currently is on Xarelto 10 mg daily.    Follow-up: 5/16/2025: He is in clinic for regular follow-up.  Since his last visit he is now taking primidone 150 mg in the morning and continues to take 250 mg at night.  Overall, he reports the tremors are better with addition of 150 mg dose but still has days when they are worse.  He denies any side effects with primidone use.  The  following portions of the patient's history were reviewed and updated as of 05/16/2025: allergies, social history, and problem list.       Current Outpatient Medications:     dabigatran etexilate (PRADAXA) 150 MG capsu, Take 1 capsule by mouth 2 (Two) Times a Day. Replacing Xarelto, Disp: 60 capsule, Rfl: 5    furosemide (LASIX) 40 MG tablet, Take 1 tablet by mouth Daily As Needed (edema, shortness of breath)., Disp: , Rfl:     lisinopril (PRINIVIL,ZESTRIL) 5 MG tablet, Take 1 tablet by mouth Daily., Disp: 90 tablet, Rfl: 1    lovastatin (ALTOPREV) 40 MG 24 hr tablet, Take 1 tablet by mouth Every Night., Disp: , Rfl:     metoprolol tartrate (LOPRESSOR) 25 MG tablet, Take 1 tablet by mouth Every 12 (Twelve) Hours., Disp: 180 tablet, Rfl: 3    Multiple Vitamins-Minerals (PRESERVISION AREDS 2 PO), Take 2 tablets by mouth Daily., Disp: , Rfl:     multivitamin (THERAGRAN) tablet tablet, Take 1 tablet by mouth Daily., Disp: , Rfl:     omeprazole (priLOSEC) 20 MG capsule, Take 1 capsule by mouth Daily., Disp: , Rfl:     potassium chloride ER (K-TAB) 20 MEQ tablet controlled-release ER tablet, Take 1 tablet by mouth Daily As Needed (when taking lasix)., Disp: , Rfl:     primidone (MYSOLINE) 250 MG tablet, Take 1 tablet by mouth 2 (Two) Times a Day for 90 days., Disp: 180 tablet, Rfl: 1    sertraline (ZOLOFT) 100 MG tablet, Take 1 tablet by mouth Daily., Disp: , Rfl:     Turmeric 500 MG capsule, Take 1 capsule by mouth Daily., Disp: , Rfl:     vitamin B-12 (CYANOCOBALAMIN) 500 MCG tablet, Take 1 tablet by mouth Daily. OTC, Disp: , Rfl:    Past Medical History:   Diagnosis Date    Anxiety     Arthritis     Atrial fibrillation 9/1/23    Coarse tremors     GERD (gastroesophageal reflux disease)     History of migraine     Hyperlipidemia     Wears dentures     full plate upper , lower, removable implant    Wears glasses       Past Surgical History:   Procedure Laterality Date    ABLATION OF DYSRHYTHMIC FOCUS  12/6/23    CARDIAC  ELECTROPHYSIOLOGY PROCEDURE N/A 12/06/2023    Procedure: Ablation atrial fibrillation; Rythmia; general anesthesia; DNS NOAC;  Surgeon: Ryan Shaikh DO;  Location: [facility] INVASIVE LOC[facility];  Service: Cardiovascular;  Laterality: N/A;    CARDIAC ELECTROPHYSIOLOGY PROCEDURE N/A 8/28/2024    Procedure: PVA, Rythmia, PFA, no meds to hold;  Surgeon: Ryan Shaikh DO;  Location:  [facility];  Service: Cardiovascular;  Laterality: N/A;    CARDIOVERSION      CIRCUMCISION      COLONOSCOPY      with polypectomy    DENTAL PROCEDURE Bilateral     implants    EYE SURGERY Bilateral     cataract      Family History   Problem Relation Age of Onset    Dementia Mother     Migraines Mother     No Known Problems Father     Asthma Brother         Review of Systems  Objective:    /100   Pulse 77   SpO2 97%     Neurology Exam:  General apperance: NAD.     Mental status: Alert, awake and oriented to time place and person.    Language and Speech: No aphasia or dysarthria.    CN II to XII: Intact.    Opthalmoscopic Exam: No papilledema.    Motor:  Right UE muscle strength 5/5. Normal tone.     Left UE muscle strength 5/5. Normal tone.      Right LE muscle strength 5/5. Normal tone.     Left LE muscle strength 5/5. Normal tone.      Sensory: Normal light touch, vibration and pinprick sensation bilaterally.    DTRs: 2+ bilaterally.    Babinski: Negative bilaterally.    Co-ordination: Normal finger-to-nose, heel to shin B/L.    Mild action and postural tremors bilaterally.    Rhomberg: Negative.    Gait: Normal.    Cardiovascular: Regular rate and rhythm without murmur, gallop or rub.    Assessment and Plan:  1. Tremor  Benign essential tremors.  Since he does not have any side effects with primidone use, I will increase the morning dose of primidone to 250 mg and will continue with 250 mg at night.  I have advised him to call office with any questions or concerns that he may have otherwise I will see him in  clinic in 6 months for follow-up.      I spent 30 minutes in patient care: Reviewing records prior to the visit, entering orders and documentation and spent more than wagner 50% of this time face-to-face in management, instructions and education regarding above mentioned diagnosis and also on counseling and discussing about taking medication regularly, possible side effects with medication use, importance of good sleep hygiene, good hydration and regular exercise.    Return in about 6 months (around 11/16/2025).       Note to patient: The 21st Century Cures Act makes medical notes like these available to patients in the interest of transparency. However, be advised this is a medical document. It is intended as peer to peer communication. It is written in medical language and may contain abbreviations or verbiage that are unfamiliar. It may appear blunt or direct. Medical documents are intended to carry relevant information, facts as evident, and the clinical opinion of the physician.

## 2025-05-28 RX ORDER — DABIGATRAN ETEXILATE 150 MG/1
CAPSULE ORAL
Qty: 60 CAPSULE | Refills: 4 | Status: SHIPPED | OUTPATIENT
Start: 2025-05-28

## 2025-06-16 ENCOUNTER — TELEPHONE (OUTPATIENT)
Dept: CARDIOLOGY | Facility: CLINIC | Age: 65
End: 2025-06-16
Payer: COMMERCIAL

## 2025-06-16 RX ORDER — DABIGATRAN ETEXILATE 150 MG/1
CAPSULE ORAL
Qty: 60 CAPSULE | Refills: 4 | Status: CANCELLED | OUTPATIENT
Start: 2025-06-16

## 2025-06-16 NOTE — TELEPHONE ENCOUNTER
Caller: Jean BaptisteDavid    Relationship: Self    Best call back number: [phone]   Requested Prescriptions:   Requested Prescriptions     Pending Prescriptions Disp Refills    dabigatran etexilate (PRADAXA) 150 MG capsu 60 capsule 4        Pharmacy where request should be sent: [facility] [address] - [phone]  - [phone] FX     Last office visit with prescribing clinician: 12/2/2024   Last telemedicine visit with prescribing clinician: Visit date not found   Next office visit with prescribing clinician: 9/8/2025     Additional details provided by patient:  PT'S PHARMACY HAS BEEN TRYING TO GET A REFILL FOR PT.     Does the patient have less than a 3 day supply:  [] Yes  [x] No    Would you like a call back once the refill request has been completed: [] Yes [x] No    If the office needs to give you a call back, can they leave a voicemail: [] Yes [x] No    Genna May, Abbie Rep   06/16/25 14:30 EDT

## 2025-08-26 RX ORDER — LISINOPRIL 5 MG/1
5 TABLET ORAL DAILY
Qty: 90 TABLET | Refills: 0 | Status: SHIPPED | OUTPATIENT
Start: 2025-08-26

## (undated) DEVICE — DOME MONITORING W BONDED STPCK BIOTRANS2

## (undated) DEVICE — ST EXT IV SMRTSTE 2VLV FIX M LL 6ML 41

## (undated) DEVICE — HIGH RESOLUTION MAPPING CATHETER: Brand: INTELLAMAP ORION™

## (undated) DEVICE — DECANT BG O JET

## (undated) DEVICE — INTRO SHEATH ENGAGE W/50 GW .038 8F12

## (undated) DEVICE — LOCATION REFERENCE PATCH KIT: Brand: RHYTHMIA™ MAPPING SYSTEM

## (undated) DEVICE — DRSNG SURESITE123 4X4.8IN

## (undated) DEVICE — ADULT, W/LG. BACK PAD, RADIOTRANSPARENT ELEMENT AND LEAD WIRE COMPATIBLE W/: Brand: DEFIBRILLATION ELECTRODES

## (undated) DEVICE — SYS CLS VASC/VENI VASCADE MVP 6TO12F

## (undated) DEVICE — OPEN-IRRIGATION TUBING SET: Brand: METRIQ™ IRRIGATION TUBING SET

## (undated) DEVICE — CATH ULTRASND ECHOCARDIOGRAPHY ACUNAV

## (undated) DEVICE — INTRO SHEATH FAST/CATH LG/LUM 11F .038IN 12CM

## (undated) DEVICE — STEERABLE SHEATH CLEAR: Brand: FARADRIVE™

## (undated) DEVICE — LEX ELECTRO PHYSIOLOGY: Brand: MEDLINE INDUSTRIES, INC.

## (undated) DEVICE — CATHETER CONNECTION CABLE: Brand: FARASTAR™

## (undated) DEVICE — Device: Brand: MEDEX

## (undated) DEVICE — PRESSURE MONITORING SET: Brand: TRUWAVE

## (undated) DEVICE — KT MANIFLD EP

## (undated) DEVICE — SET PRIMARY GRVTY 10DP MALE LL 104IN

## (undated) DEVICE — STERILE (15.2 TAPERED TO 7.6 X 183CM) POLYETHYLENE ACCORDION-FOLDED COVER FOR USE WITH SIEMENS ACUNAV ULTRASOUND CATHETER FAMILY CONNECTOR: Brand: SWIFTLINK TRANSDUCER COVER

## (undated) DEVICE — Device: Brand: WEBSTER CS

## (undated) DEVICE — GW TRNSEP SAFESEPT LT/ATRIAL RO 135CM .014IN

## (undated) DEVICE — INTRO SHEATH TRNSEP .032 SL0 8.5F 63CM

## (undated) DEVICE — SYS TRNSEP ACC BRK ACROSS A/ 18G 98CM

## (undated) DEVICE — ST INF PRI SMRTSTE 20DRP 2VLV 24ML 117

## (undated) DEVICE — INTRO STEER AGILIS NXT MED/CURL 8.5F

## (undated) DEVICE — TBG SXN ESOPH ENSOETM FOR/BLANETROL/1/2

## (undated) DEVICE — ABLATION CATHETER: Brand: INTELLANAV MIFI™ OPEN-IRRIGATED

## (undated) DEVICE — ST EXT IV SMARTSITE PINCH/CLMP 5ML 46CM

## (undated) DEVICE — ELECTRD RETRN/GRND MEGADYNE SGL/PLT W/CORD 9FT DISP

## (undated) DEVICE — GW INQWIRE ROSEN/TIP PTFE FC J/TP/1.5MM 0.035IN 180CM

## (undated) DEVICE — SYS TRNSEP ACC BRK ACROSS A/ 71CM

## (undated) DEVICE — SOL NACL 0.9PCT 1000ML

## (undated) DEVICE — SYS CLS VASC/VENI VASCADE/MVP 10TO12F XL

## (undated) DEVICE — PULSED FIELD ABLATION CATHETER: Brand: FARAWAVE™

## (undated) DEVICE — 1 X VERSACROSS CONNECT TRANSSEPTAL DILATOR;  1 X VERSACROSS RF WIRE (INCLUDING 1 X CONNECTOR CABLE (SINGLE USE)): Brand: VERSACROSS CONNECT ACCESS SOLUTION FOR FARADRIVE